# Patient Record
Sex: FEMALE | Race: WHITE | NOT HISPANIC OR LATINO | Employment: OTHER | URBAN - METROPOLITAN AREA
[De-identification: names, ages, dates, MRNs, and addresses within clinical notes are randomized per-mention and may not be internally consistent; named-entity substitution may affect disease eponyms.]

---

## 2017-10-04 ENCOUNTER — OFFICE VISIT (OUTPATIENT)
Dept: CARDIOLOGY | Facility: CLINIC | Age: 77
End: 2017-10-04

## 2017-10-04 VITALS
SYSTOLIC BLOOD PRESSURE: 178 MMHG | DIASTOLIC BLOOD PRESSURE: 84 MMHG | WEIGHT: 231 LBS | HEART RATE: 72 BPM | BODY MASS INDEX: 33.15 KG/M2

## 2017-10-04 DIAGNOSIS — R07.2 PRECORDIAL PAIN: ICD-10-CM

## 2017-10-04 DIAGNOSIS — I25.10 ATHEROSCLEROSIS OF NATIVE CORONARY ARTERY OF NATIVE HEART WITHOUT ANGINA PECTORIS: ICD-10-CM

## 2017-10-04 DIAGNOSIS — I10 ESSENTIAL HYPERTENSION: Primary | ICD-10-CM

## 2017-10-04 DIAGNOSIS — E78.5 HYPERLIPIDEMIA, UNSPECIFIED HYPERLIPIDEMIA TYPE: ICD-10-CM

## 2017-10-04 PROCEDURE — 99214 OFFICE O/P EST MOD 30 MIN: CPT | Performed by: INTERNAL MEDICINE

## 2017-10-04 RX ORDER — AMLODIPINE BESYLATE 5 MG/1
5 TABLET ORAL DAILY
Qty: 90 TABLET | Refills: 3 | Status: SHIPPED | OUTPATIENT
Start: 2017-10-04 | End: 2018-12-02 | Stop reason: SDUPTHER

## 2017-10-04 NOTE — PROGRESS NOTES
Subjective:       Cierra Kc is a 77 y.o. female who here for follow up    CC  Couple months waking up with lt arm pain  Radiating to retrosternal with sweats  HPI  77-year-old white female with well-known to us with the known history of coronary artery disease hypertension and hyperlipidemia has been under significant stress blood for the last couple months has been awakening with a left arm pain radiating to the retrosternal as well as associated sweats and shortness of breath gradually worsening lasting for several minutes     Problem List Items Addressed This Visit        Cardiovascular and Mediastinum    Atherosclerosis of coronary artery    Relevant Medications    amLODIPine (NORVASC) 5 MG tablet    Essential hypertension - Primary    Relevant Medications    amLODIPine (NORVASC) 5 MG tablet    Hyperlipidemia       Nervous and Auditory    Chest pain        .    The following portions of the patient's history were reviewed and updated as appropriate: allergies, current medications, past family history, past medical history, past social history, past surgical history and problem list.    Past Medical History:   Diagnosis Date   • COPD (chronic obstructive pulmonary disease)    • Coronary artery disease    • DDD (degenerative disc disease), lumbar    • Diastolic CHF    • Hyperlipidemia    • Hypertension    • Rectal abscess    • Stroke    • Vitamin B 12 deficiency     reports that she has never smoked. She has never used smokeless tobacco. She reports that she does not drink alcohol or use illicit drugs.  Family History   Problem Relation Age of Onset   • Hypertension Mother    • Hyperlipidemia Mother    • Hypertension Father    • COPD Father    • Stroke Brother    • Clotting disorder Brother    • Hypertension Brother    • Hyperlipidemia Brother    • Diabetes Maternal Aunt    • Diabetes Maternal Uncle    • Heart disease Maternal Grandmother    • Stroke Maternal Grandmother    • Diabetes Maternal Grandmother         Review of Systems  Constitutional: No wt loss, fever, fatigue  Gastrointestinal: No nausea, abdominal pain  Behavioral/Psych: No insomnia or anxiety   Cardiovascular Chest pains and left arm pain  Objective:       Physical Exam             Physical Exam  /84  Pulse 72  Wt 231 lb (105 kg)  BMI 33.15 kg/m2    General appearance: NAD, conversant   Eyes: anicteric sclerae, moist conjunctivae; no lid-lag; PERRLA   HENT: Atraumatic; oropharynx clear with moist mucous membranes and no mucosal ulcerations;  normal hard and soft palate   Neck: Trachea midline; FROM, supple, no thyromegaly or lymphadenopathy   Lungs: CTA, with normal respiratory effort and no intercostal retractions   CV: S1-S2 regular, no murmurs, no rub, no gallop   Abdomen: Soft, non-tender; no masses or HSM   Extremities: No peripheral edema or extremity lymphadenopathy  Skin: Normal temperature, turgor and texture; no rash, ulcers or subcutaneous nodules   Psych: Appropriate affect, alert and oriented to person, place and time           Cardiographics  @Procedures    Echocardiogram:        Current Outpatient Prescriptions:   •  citalopram (CeleXA) 40 MG tablet, Take 20 mg by mouth Daily., Disp: , Rfl:   •  divalproex (DEPAKOTE) 500 MG DR tablet, Take 500 mg by mouth 3 (Three) Times a Day., Disp: , Rfl:   •  famotidine (PEPCID) 20 MG tablet, Take 20 mg by mouth 2 (Two) Times a Day., Disp: , Rfl:   •  furosemide (LASIX) 40 MG tablet, Take 40 mg by mouth As Needed., Disp: , Rfl:   •  isosorbide mononitrate (IMDUR) 30 MG 24 hr tablet, Take 30 mg by mouth Daily., Disp: , Rfl:   •  metoprolol succinate XL (TOPROL-XL) 50 MG 24 hr tablet, Take 50 mg by mouth Daily., Disp: , Rfl:   •  Multiple Vitamins-Minerals (MULTI COMPLETE PO), Take  by mouth., Disp: , Rfl:   •  nitroglycerin (NITROSTAT) 0.4 MG SL tablet, Place  under the tongue., Disp: , Rfl:    Assessment:        Patient Active Problem List   Diagnosis   • Abnormal finding on thallium stress  test   • Chest pain   • Atherosclerosis of coronary artery   • Essential hypertension   • Breath shortness   • Unstable angina pectoris   • Disorder of joint   • History of cardiac catheterization   • Abscess of rectum   • Lumbar radiculopathy   • Degeneration of intervertebral disc of lumbar region   • Infected sebaceous cyst   • Cerebral hemorrhage   • Hyperlipidemia   • Chronic diastolic heart failure   • Depression   • Sebaceous cyst of skin of breast   • Moderate COPD (chronic obstructive pulmonary disease)   • Chronic obstructive pulmonary disease   • Community acquired pneumonia   • Chronic low back pain   • Abscess   • Hyperlipemia   • Acute non-ST-elevation myocardial infarction   • Dyslipidemia     11/16      Impression: 1. Angiographically normal coronaries  2. Normal LV systolic function  3. No aortic stenosis, mild mitral regurgitation      Plan:            ICD-10-CM ICD-9-CM   1. Essential hypertension I10 401.9   2. Atherosclerosis of native coronary artery of native heart without angina pectoris I25.10 414.01   3. Precordial pain R07.2 786.51   4. Hyperlipidemia, unspecified hyperlipidemia type E78.5 272.4     1. Essential hypertension  The blood pressure several controlled    2. Atherosclerosis of native coronary artery of native heart without angina pectoris  Cierra Kc with coronary artery disease has no angina pectoris    Risk reduction for the coronary artery disease, controlling the blood pressure, blood sugar management, cholesterol management, exercise, stress management, and proper compliance with medications and follow-up has been discussed      3. Precordial pain  Atypical    4. Hyperlipidemia, unspecified hyperlipidemia type  Counseling has been done     Lt arm pain    Dc isosorbide    Start Norvasc 5 mg po daily  COUNSELING:    Cierra Sotokurtiseling was given to patient for the following topics: diagnostic results, risk factor reductions, impressions, risks and benefits of treatment  options and importance of treatment compliance .       SMOKING COUNSELING:    Counseling given: Not Answered      EMR Dragon/Transcription disclaimer:   Much of this encounter note is an electronic transcription/translation of spoken language to printed text. The electronic translation of spoken language may permit erroneous, or at times, nonsensical words or phrases to be inadvertently transcribed; Although I have reviewed the note for such errors, some may still exist.

## 2017-11-01 ENCOUNTER — OFFICE VISIT (OUTPATIENT)
Dept: CARDIOLOGY | Facility: CLINIC | Age: 77
End: 2017-11-01

## 2017-11-01 VITALS
DIASTOLIC BLOOD PRESSURE: 83 MMHG | HEIGHT: 70 IN | HEART RATE: 71 BPM | WEIGHT: 232 LBS | BODY MASS INDEX: 33.21 KG/M2 | SYSTOLIC BLOOD PRESSURE: 161 MMHG

## 2017-11-01 DIAGNOSIS — I10 ESSENTIAL HYPERTENSION: Primary | ICD-10-CM

## 2017-11-01 DIAGNOSIS — I25.10 ATHEROSCLEROSIS OF NATIVE CORONARY ARTERY OF NATIVE HEART WITHOUT ANGINA PECTORIS: ICD-10-CM

## 2017-11-01 DIAGNOSIS — E78.5 HYPERLIPIDEMIA, UNSPECIFIED HYPERLIPIDEMIA TYPE: ICD-10-CM

## 2017-11-01 PROCEDURE — 99213 OFFICE O/P EST LOW 20 MIN: CPT | Performed by: INTERNAL MEDICINE

## 2017-11-01 RX ORDER — HYDROCODONE BITARTRATE AND ACETAMINOPHEN 7.5; 325 MG/1; MG/1
1 TABLET ORAL AS NEEDED
COMMUNITY
End: 2020-01-21

## 2017-11-01 NOTE — PROGRESS NOTES
Subjective:       Cierra Kc is a 77 y.o. female who here for follow up    CC  bp better with epidural shots and norvasc  HPI  77-year-old white female with a known history of atherosclerosis benign essential arterial hypertension as well as a hyperlipidemia here for the follow-up, blood pressures are much much better after that-year-old shock as well as Norvasc denies any chest pains or tightness in chest     Problem List Items Addressed This Visit        Cardiovascular and Mediastinum    Atherosclerosis of coronary artery    Essential hypertension - Primary    Hyperlipidemia        .    The following portions of the patient's history were reviewed and updated as appropriate: allergies, current medications, past family history, past medical history, past social history, past surgical history and problem list.    Past Medical History:   Diagnosis Date   • COPD (chronic obstructive pulmonary disease)    • Coronary artery disease    • DDD (degenerative disc disease), lumbar    • Diastolic CHF    • Hyperlipidemia    • Hypertension    • Rectal abscess    • Stroke    • Vitamin B 12 deficiency     reports that she has never smoked. She has never used smokeless tobacco. She reports that she does not drink alcohol or use illicit drugs.  Family History   Problem Relation Age of Onset   • Hypertension Mother    • Hyperlipidemia Mother    • Hypertension Father    • COPD Father    • Stroke Brother    • Clotting disorder Brother    • Hypertension Brother    • Hyperlipidemia Brother    • Diabetes Maternal Aunt    • Diabetes Maternal Uncle    • Heart disease Maternal Grandmother    • Stroke Maternal Grandmother    • Diabetes Maternal Grandmother        Review of Systems  Constitutional: No wt loss, fever, fatigue  Gastrointestinal: No nausea, abdominal pain  Behavioral/Psych: No insomnia or anxiety   Cardiovascular No chest pains or tightness in chest  Objective:       Physical Exam             Physical Exam  /83  Pulse  "71  Ht 70\" (177.8 cm)  Wt 232 lb (105 kg)  BMI 33.29 kg/m2    General appearance: NAD, conversant   Eyes: anicteric sclerae, moist conjunctivae; no lid-lag; PERRLA   HENT: Atraumatic; oropharynx clear with moist mucous membranes and no mucosal ulcerations;  normal hard and soft palate   Neck: Trachea midline; FROM, supple, no thyromegaly or lymphadenopathy   Lungs: CTA, with normal respiratory effort and no intercostal retractions   CV: S1-S2 regular, no murmurs, no rub, no gallop   Abdomen: Soft, non-tender; no masses or HSM   Extremities: No peripheral edema or extremity lymphadenopathy  Skin: Normal temperature, turgor and texture; no rash, ulcers or subcutaneous nodules   Psych: Appropriate affect, alert and oriented to person, place and time           Cardiographics  @Procedures    Echocardiogram:        Current Outpatient Prescriptions:   •  amLODIPine (NORVASC) 5 MG tablet, Take 1 tablet by mouth Daily., Disp: 90 tablet, Rfl: 3  •  citalopram (CeleXA) 40 MG tablet, Take 20 mg by mouth Daily., Disp: , Rfl:   •  divalproex (DEPAKOTE) 500 MG DR tablet, Take 500 mg by mouth 3 (Three) Times a Day., Disp: , Rfl:   •  famotidine (PEPCID) 20 MG tablet, Take 20 mg by mouth 2 (Two) Times a Day., Disp: , Rfl:   •  furosemide (LASIX) 40 MG tablet, Take 40 mg by mouth As Needed., Disp: , Rfl:   •  HYDROcodone-acetaminophen (NORCO) 7.5-325 MG per tablet, Take 1 tablet by mouth As Needed for Moderate Pain ., Disp: , Rfl:   •  metoprolol succinate XL (TOPROL-XL) 50 MG 24 hr tablet, Take 50 mg by mouth Daily., Disp: , Rfl:   •  Multiple Vitamins-Minerals (MULTI COMPLETE PO), Take  by mouth., Disp: , Rfl:   •  TURMERIC PO, Take  by mouth., Disp: , Rfl:   •  MULTIPLE VITAMINS-MINERALS ER PO, Take  by mouth., Disp: , Rfl:   •  nitroglycerin (NITROSTAT) 0.4 MG SL tablet, Place  under the tongue., Disp: , Rfl:    Assessment:        Patient Active Problem List   Diagnosis   • Abnormal finding on thallium stress test   • Chest " pain   • Atherosclerosis of coronary artery   • Essential hypertension   • Breath shortness   • Unstable angina pectoris   • Disorder of joint   • History of cardiac catheterization   • Abscess of rectum   • Lumbar radiculopathy   • Degeneration of intervertebral disc of lumbar region   • Infected sebaceous cyst   • Cerebral hemorrhage   • Hyperlipidemia   • Chronic diastolic heart failure   • Depression   • Sebaceous cyst of skin of breast   • Moderate COPD (chronic obstructive pulmonary disease)   • Chronic obstructive pulmonary disease   • Community acquired pneumonia   • Chronic low back pain   • Abscess   • Hyperlipemia   • Acute non-ST-elevation myocardial infarction   • Dyslipidemia               Plan:            ICD-10-CM ICD-9-CM   1. Essential hypertension I10 401.9   2. Atherosclerosis of native coronary artery of native heart without angina pectoris I25.10 414.01   3. Hyperlipidemia, unspecified hyperlipidemia type E78.5 272.4     1. Essential hypertension  Blood pressure better than before    Blood pressures are high because of the patient's severe back pain    2. Atherosclerosis of native coronary artery of native heart without angina pectoris  Cierra Kc with coronary artery disease has no angina pectoris    Risk reduction for the coronary artery disease, controlling the blood pressure, blood sugar management, cholesterol management, exercise, stress management, and proper compliance with medications and follow-up has been discussed      3. Hyperlipidemia, unspecified hyperlipidemia type  Risk of the hyperlipidemia, importance of the treatment has been explained    Pros and cons of the statins has been explained    Regular blood workup as well as side effects including the liver failure, myelopathy death has been explained        see us in 6 months  COUNSELING:    Cierra Sotoisrael was given to patient for the following topics: diagnostic results, risk factor reductions, impressions, risks  and benefits of treatment options and importance of treatment compliance .       SMOKING COUNSELING:    Counseling given: Not Answered      EMR Dragon/Transcription disclaimer:   Much of this encounter note is an electronic transcription/translation of spoken language to printed text. The electronic translation of spoken language may permit erroneous, or at times, nonsensical words or phrases to be inadvertently transcribed; Although I have reviewed the note for such errors, some may still exist.

## 2018-04-25 ENCOUNTER — OFFICE VISIT (OUTPATIENT)
Dept: CARDIOLOGY | Facility: CLINIC | Age: 78
End: 2018-04-25

## 2018-04-25 VITALS
HEIGHT: 70 IN | WEIGHT: 234 LBS | BODY MASS INDEX: 33.5 KG/M2 | DIASTOLIC BLOOD PRESSURE: 77 MMHG | HEART RATE: 82 BPM | SYSTOLIC BLOOD PRESSURE: 141 MMHG

## 2018-04-25 DIAGNOSIS — I25.10 ATHEROSCLEROSIS OF NATIVE CORONARY ARTERY OF NATIVE HEART WITHOUT ANGINA PECTORIS: ICD-10-CM

## 2018-04-25 DIAGNOSIS — R07.2 PRECORDIAL PAIN: ICD-10-CM

## 2018-04-25 DIAGNOSIS — E78.5 HYPERLIPIDEMIA, UNSPECIFIED HYPERLIPIDEMIA TYPE: ICD-10-CM

## 2018-04-25 DIAGNOSIS — I10 ESSENTIAL HYPERTENSION: Primary | ICD-10-CM

## 2018-04-25 PROCEDURE — 99213 OFFICE O/P EST LOW 20 MIN: CPT | Performed by: INTERNAL MEDICINE

## 2018-04-25 RX ORDER — PAROXETINE HYDROCHLORIDE 20 MG/1
20 TABLET, FILM COATED ORAL
COMMUNITY
Start: 2017-11-27 | End: 2018-11-27

## 2018-04-25 NOTE — PROGRESS NOTES
Subjective:       Cierra Kc is a 77 y.o. female who here for follow up    CC  Sob, getting worse  Occasional chest tightness  HPI  77-year-old female with known history of the coronary artery disease, benign essential arterial hypertension as well as hyperlipidemia has been complaining of shortness of breath getting worse, along with occasional tightness in the pressure sensations mild-to-moderate in intensity lasting for several minutes     Problem List Items Addressed This Visit        Cardiovascular and Mediastinum    Atherosclerosis of coronary artery    Essential hypertension - Primary    Hyperlipidemia      Other Visit Diagnoses    None.       .    The following portions of the patient's history were reviewed and updated as appropriate: allergies, current medications, past family history, past medical history, past social history, past surgical history and problem list.    Past Medical History:   Diagnosis Date   • COPD (chronic obstructive pulmonary disease)    • Coronary artery disease    • DDD (degenerative disc disease), lumbar    • Diastolic CHF    • Hyperlipidemia    • Hypertension    • Rectal abscess    • Stroke    • Vitamin B 12 deficiency     reports that she has never smoked. She has never used smokeless tobacco. She reports that she drinks alcohol. She reports that she does not use drugs.  Family History   Problem Relation Age of Onset   • Hypertension Mother    • Hyperlipidemia Mother    • Hypertension Father    • COPD Father    • Stroke Brother    • Clotting disorder Brother    • Hypertension Brother    • Hyperlipidemia Brother    • Diabetes Maternal Aunt    • Diabetes Maternal Uncle    • Heart disease Maternal Grandmother    • Stroke Maternal Grandmother    • Diabetes Maternal Grandmother        Review of Systems  Constitutional: No wt loss, fever, fatigue  Gastrointestinal: No nausea, abdominal pain  Behavioral/Psych: No insomnia or anxiety   Cardiovascular Shortness of breath and chest  "tightness  Objective:       Physical Exam           Physical Exam  /77   Pulse 82   Ht 177.8 cm (70\")   Wt 106 kg (234 lb)   BMI 33.58 kg/m²     General appearance: NAD, conversant   Eyes: anicteric sclerae, moist conjunctivae; no lid-lag; PERRLA   HENT: Atraumatic; oropharynx clear with moist mucous membranes and no mucosal ulcerations;  normal hard and soft palate   Neck: Trachea midline; FROM, supple, no thyromegaly or lymphadenopathy   Lungs: CTA, with normal respiratory effort and no intercostal retractions   CV: S1-S2 regular, no murmurs, no rub, no gallop   Abdomen: Soft, non-tender; no masses or HSM   Extremities: No peripheral edema or extremity lymphadenopathy  Skin: Normal temperature, turgor and texture; no rash, ulcers or subcutaneous nodules   Psych: Appropriate affect, alert and oriented to person, place and time           Cardiographics  @Procedures    Echocardiogram:        Current Outpatient Prescriptions:   •  amLODIPine (NORVASC) 5 MG tablet, Take 1 tablet by mouth Daily., Disp: 90 tablet, Rfl: 3  •  divalproex (DEPAKOTE) 500 MG DR tablet, Take 500 mg by mouth 3 (Three) Times a Day., Disp: , Rfl:   •  famotidine (PEPCID) 20 MG tablet, Take 20 mg by mouth 2 (Two) Times a Day., Disp: , Rfl:   •  furosemide (LASIX) 40 MG tablet, Take 40 mg by mouth As Needed., Disp: , Rfl:   •  HYDROcodone-acetaminophen (NORCO) 7.5-325 MG per tablet, Take 1 tablet by mouth As Needed for Moderate Pain ., Disp: , Rfl:   •  metoprolol succinate XL (TOPROL-XL) 50 MG 24 hr tablet, Take 50 mg by mouth Daily., Disp: , Rfl:   •  Multiple Vitamins-Minerals (MULTI COMPLETE PO), Take  by mouth., Disp: , Rfl:   •  MULTIPLE VITAMINS-MINERALS ER PO, Take  by mouth., Disp: , Rfl:   •  PARoxetine (PAXIL) 20 MG tablet, Take 20 mg by mouth., Disp: , Rfl:   •  TURMERIC PO, Take  by mouth., Disp: , Rfl:   •  nitroglycerin (NITROSTAT) 0.4 MG SL tablet, Place  under the tongue., Disp: , Rfl:    Assessment:        Patient Active " Problem List   Diagnosis   • Abnormal finding on thallium stress test   • Chest pain   • Atherosclerosis of coronary artery   • Essential hypertension   • Breath shortness   • Unstable angina pectoris   • Disorder of joint   • History of cardiac catheterization   • Abscess of rectum   • Lumbar radiculopathy   • Degeneration of intervertebral disc of lumbar region   • Infected sebaceous cyst   • Cerebral hemorrhage   • Hyperlipidemia   • Chronic diastolic heart failure   • Depression   • Sebaceous cyst of skin of breast   • Moderate COPD (chronic obstructive pulmonary disease)   • Chronic obstructive pulmonary disease   • Community acquired pneumonia   • Chronic low back pain   • Abscess   • Hyperlipemia   • Acute non-ST-elevation myocardial infarction   • Dyslipidemia     Chol 171          Plan:            ICD-10-CM ICD-9-CM   1. Essential hypertension I10 401.9   2. Atherosclerosis of native coronary artery of native heart without angina pectoris I25.10 414.01   3. Hyperlipidemia, unspecified hyperlipidemia type E78.5 272.4   4. Precordial pain R07.2 786.51     1. Essential hypertension  Blood pressures well-controlled  - Stress Test With Myocardial Perfusion - One Day  - Adult Transthoracic Echo Complete W/ Cont if Necessary Per Protocol    2. Atherosclerosis of native coronary artery of native heart without angina pectoris  Cierra Kc with coronary artery disease has no angina pectoris    Risk reduction for the coronary artery disease, controlling the blood pressure, blood sugar management, cholesterol management, exercise, stress management, and proper compliance with medications and follow-up has been discussed    - Stress Test With Myocardial Perfusion - One Day  - Adult Transthoracic Echo Complete W/ Cont if Necessary Per Protocol    3. Hyperlipidemia, unspecified hyperlipidemia type  Risk of the hyperlipidemia, importance of the treatment has been explained    Pros and cons of the statins has been  explained    Regular blood workup as well as side effects including the liver failure, myelopathy death has been explained        - Stress Test With Myocardial Perfusion - One Day  - Adult Transthoracic Echo Complete W/ Cont if Necessary Per Protocol    4. Precordial pain  Considering the patient's symptoms as well as clinical situation and  EKG findings, along with cardiac risk factors, ischemic workup is necessary to rule out ischemic cardiomyopathy, stress induced arrhythmias, and functional capacity for diagnosis as well as prognostic consideration    Considering patient's medical condition as well as the risk factors, patient will require echocardiogram for further evaluation for the LV function, four-chamber evaluation, including the pressures, valvular function and  pericardial disease and pericardial effusion    - Stress Test With Myocardial Perfusion - One Day  - Adult Transthoracic Echo Complete W/ Cont if Necessary Per Protocol       [facility], echo    See us 2-4 wks  COUNSELING:    Cierra Dick was given to patient for the following topics: diagnostic results, risk factor reductions, impressions, risks and benefits of treatment options and importance of treatment compliance .       SMOKING COUNSELING:    Counseling given: Not Answered      EMR Dragon/Transcription disclaimer:   Much of this encounter note is an electronic transcription/translation of spoken language to printed text. The electronic translation of spoken language may permit erroneous, or at times, nonsensical words or phrases to be inadvertently transcribed; Although I have reviewed the note for such errors, some may still exist.

## 2018-05-01 ENCOUNTER — HOSPITAL ENCOUNTER (OUTPATIENT)
Dept: CARDIOLOGY | Facility: HOSPITAL | Age: 78
Discharge: HOME | End: 2018-05-01
Attending: INTERNAL MEDICINE | Admitting: INTERNAL MEDICINE

## 2018-05-01 VITALS
HEIGHT: 70 IN | BODY MASS INDEX: 33.5 KG/M2 | HEART RATE: 73 BPM | DIASTOLIC BLOOD PRESSURE: 77 MMHG | WEIGHT: 234 LBS | SYSTOLIC BLOOD PRESSURE: 141 MMHG

## 2018-05-01 LAB
BH CV ECHO MEAS - ACS: 1.8 CM
BH CV ECHO MEAS - AO MAX PG (FULL): 4.2 MMHG
BH CV ECHO MEAS - AO MAX PG: 7.3 MMHG
BH CV ECHO MEAS - AO MEAN PG (FULL): 3 MMHG
BH CV ECHO MEAS - AO MEAN PG: 4 MMHG
BH CV ECHO MEAS - AO ROOT AREA (BSA CORRECTED): 1.4
BH CV ECHO MEAS - AO ROOT AREA: 8 CM^2
BH CV ECHO MEAS - AO ROOT DIAM: 3.2 CM
BH CV ECHO MEAS - AO V2 MAX: 135 CM/SEC
BH CV ECHO MEAS - AO V2 MEAN: 90.9 CM/SEC
BH CV ECHO MEAS - AO V2 VTI: 23.5 CM
BH CV ECHO MEAS - AVA(I,A): 3.5 CM^2
BH CV ECHO MEAS - AVA(I,D): 3.5 CM^2
BH CV ECHO MEAS - AVA(V,A): 2.9 CM^2
BH CV ECHO MEAS - AVA(V,D): 2.9 CM^2
BH CV ECHO MEAS - BSA(HAYCOCK): 2.3 M^2
BH CV ECHO MEAS - BSA: 2.2 M^2
BH CV ECHO MEAS - BZI_BMI: 33.6 KILOGRAMS/M^2
BH CV ECHO MEAS - BZI_METRIC_HEIGHT: 177.8 CM
BH CV ECHO MEAS - BZI_METRIC_WEIGHT: 106.1 KG
BH CV ECHO MEAS - CONTRAST EF (2CH): 65 ML/M^2
BH CV ECHO MEAS - CONTRAST EF 4CH: 66.7 ML/M^2
BH CV ECHO MEAS - EDV(CUBED): 74.1 ML
BH CV ECHO MEAS - EDV(MOD-SP2): 60 ML
BH CV ECHO MEAS - EDV(MOD-SP4): 60 ML
BH CV ECHO MEAS - EDV(TEICH): 78.6 ML
BH CV ECHO MEAS - EF(CUBED): 59.8 %
BH CV ECHO MEAS - EF(MOD-BP): 60 %
BH CV ECHO MEAS - EF(MOD-SP2): 65 %
BH CV ECHO MEAS - EF(MOD-SP4): 66.7 %
BH CV ECHO MEAS - EF(TEICH): 51.7 %
BH CV ECHO MEAS - ESV(CUBED): 29.8 ML
BH CV ECHO MEAS - ESV(MOD-SP2): 21 ML
BH CV ECHO MEAS - ESV(MOD-SP4): 20 ML
BH CV ECHO MEAS - ESV(TEICH): 37.9 ML
BH CV ECHO MEAS - FS: 26.2 %
BH CV ECHO MEAS - IVS/LVPW: 0.92
BH CV ECHO MEAS - IVSD: 1.1 CM
BH CV ECHO MEAS - LA DIMENSION: 3.9 CM
BH CV ECHO MEAS - LA/AO: 1.2
BH CV ECHO MEAS - LAT PEAK E' VEL: 6.3 CM/SEC
BH CV ECHO MEAS - LV DIASTOLIC VOL/BSA (35-75): 26.9 ML/M^2
BH CV ECHO MEAS - LV MASS(C)D: 167.4 GRAMS
BH CV ECHO MEAS - LV MASS(C)DI: 75 GRAMS/M^2
BH CV ECHO MEAS - LV MAX PG: 3.1 MMHG
BH CV ECHO MEAS - LV MEAN PG: 1 MMHG
BH CV ECHO MEAS - LV SYSTOLIC VOL/BSA (12-30): 9 ML/M^2
BH CV ECHO MEAS - LV V1 MAX: 87.9 CM/SEC
BH CV ECHO MEAS - LV V1 MEAN: 56.3 CM/SEC
BH CV ECHO MEAS - LV V1 VTI: 18 CM
BH CV ECHO MEAS - LVIDD: 4.2 CM
BH CV ECHO MEAS - LVIDS: 3.1 CM
BH CV ECHO MEAS - LVLD AP2: 6.6 CM
BH CV ECHO MEAS - LVLD AP4: 7.1 CM
BH CV ECHO MEAS - LVLS AP2: 5.2 CM
BH CV ECHO MEAS - LVLS AP4: 5.9 CM
BH CV ECHO MEAS - LVOT AREA (M): 4.5 CM^2
BH CV ECHO MEAS - LVOT AREA: 4.5 CM^2
BH CV ECHO MEAS - LVOT DIAM: 2.4 CM
BH CV ECHO MEAS - LVPWD: 1.2 CM
BH CV ECHO MEAS - MED PEAK E' VEL: 10.9 CM/SEC
BH CV ECHO MEAS - MV A DUR: 0.16 SEC
BH CV ECHO MEAS - MV A MAX VEL: 65.5 CM/SEC
BH CV ECHO MEAS - MV DEC SLOPE: 203 CM/SEC^2
BH CV ECHO MEAS - MV DEC TIME: 0.22 SEC
BH CV ECHO MEAS - MV E MAX VEL: 50.4 CM/SEC
BH CV ECHO MEAS - MV E/A: 0.77
BH CV ECHO MEAS - MV MAX PG: 3.6 MMHG
BH CV ECHO MEAS - MV MEAN PG: 1 MMHG
BH CV ECHO MEAS - MV P1/2T MAX VEL: 59.7 CM/SEC
BH CV ECHO MEAS - MV P1/2T: 86.1 MSEC
BH CV ECHO MEAS - MV V2 MAX: 94.7 CM/SEC
BH CV ECHO MEAS - MV V2 MEAN: 50.4 CM/SEC
BH CV ECHO MEAS - MV V2 VTI: 19.3 CM
BH CV ECHO MEAS - MVA P1/2T LCG: 3.7 CM^2
BH CV ECHO MEAS - MVA(P1/2T): 2.6 CM^2
BH CV ECHO MEAS - MVA(VTI): 4.2 CM^2
BH CV ECHO MEAS - PA ACC TIME: 0.09 SEC
BH CV ECHO MEAS - PA MAX PG (FULL): 1.3 MMHG
BH CV ECHO MEAS - PA MAX PG: 3.7 MMHG
BH CV ECHO MEAS - PA PR(ACCEL): 39.9 MMHG
BH CV ECHO MEAS - PA V2 MAX: 96.4 CM/SEC
BH CV ECHO MEAS - PULM A REVS DUR: 0.15 SEC
BH CV ECHO MEAS - PULM A REVS VEL: 29.3 CM/SEC
BH CV ECHO MEAS - PULM DIAS VEL: 34.6 CM/SEC
BH CV ECHO MEAS - PULM S/D: 1.3
BH CV ECHO MEAS - PULM SYS VEL: 46.5 CM/SEC
BH CV ECHO MEAS - PVA(V,A): 3.6 CM^2
BH CV ECHO MEAS - PVA(V,D): 3.6 CM^2
BH CV ECHO MEAS - QP/QS: 0.73
BH CV ECHO MEAS - RV MAX PG: 2.4 MMHG
BH CV ECHO MEAS - RV MEAN PG: 1 MMHG
BH CV ECHO MEAS - RV V1 MAX: 77.6 CM/SEC
BH CV ECHO MEAS - RV V1 MEAN: 56.1 CM/SEC
BH CV ECHO MEAS - RV V1 VTI: 13.1 CM
BH CV ECHO MEAS - RVDD: 2.8 CM
BH CV ECHO MEAS - RVOT AREA: 4.5 CM^2
BH CV ECHO MEAS - RVOT DIAM: 2.4 CM
BH CV ECHO MEAS - SI(AO): 84.7 ML/M^2
BH CV ECHO MEAS - SI(CUBED): 19.9 ML/M^2
BH CV ECHO MEAS - SI(LVOT): 36.5 ML/M^2
BH CV ECHO MEAS - SI(MOD-SP2): 17.5 ML/M^2
BH CV ECHO MEAS - SI(MOD-SP4): 17.9 ML/M^2
BH CV ECHO MEAS - SI(TEICH): 18.2 ML/M^2
BH CV ECHO MEAS - SV(AO): 189 ML
BH CV ECHO MEAS - SV(CUBED): 44.3 ML
BH CV ECHO MEAS - SV(LVOT): 81.4 ML
BH CV ECHO MEAS - SV(MOD-SP2): 39 ML
BH CV ECHO MEAS - SV(MOD-SP4): 40 ML
BH CV ECHO MEAS - SV(RVOT): 59.3 ML
BH CV ECHO MEAS - SV(TEICH): 40.7 ML
BH CV ECHO MEAS - TAPSE (>1.6): 2 CM2
BH CV ECHO MEASUREMENTS AVERAGE E/E' RATIO: 5.86
BH CV XLRA - RV BASE: 3.8 CM
BH CV XLRA - RV LENGTH: 7.5 CM
BH CV XLRA - RV MID: 2.5 CM
BH CV XLRA - TDI S': 17.3 CM/SEC
LEFT ATRIUM VOLUME INDEX: 29 ML/M2
MAXIMAL PREDICTED HEART RATE: 143 BPM
STRESS TARGET HR: 122 BPM

## 2018-05-01 PROCEDURE — 93306 TTE W/DOPPLER COMPLETE: CPT | Performed by: INTERNAL MEDICINE

## 2018-05-01 PROCEDURE — 93306 TTE W/DOPPLER COMPLETE: CPT

## 2018-05-17 ENCOUNTER — HOSPITAL ENCOUNTER (OUTPATIENT)
Dept: CARDIOLOGY | Facility: HOSPITAL | Age: 78
End: 2018-05-17
Attending: INTERNAL MEDICINE

## 2018-05-17 ENCOUNTER — OFFICE VISIT (OUTPATIENT)
Dept: CARDIOLOGY | Facility: CLINIC | Age: 78
End: 2018-05-17

## 2018-05-17 VITALS
HEART RATE: 97 BPM | DIASTOLIC BLOOD PRESSURE: 81 MMHG | HEIGHT: 70 IN | WEIGHT: 237 LBS | SYSTOLIC BLOOD PRESSURE: 143 MMHG | BODY MASS INDEX: 33.93 KG/M2

## 2018-05-17 DIAGNOSIS — I10 ESSENTIAL HYPERTENSION: ICD-10-CM

## 2018-05-17 DIAGNOSIS — I25.10 ATHEROSCLEROSIS OF NATIVE CORONARY ARTERY OF NATIVE HEART WITHOUT ANGINA PECTORIS: Primary | ICD-10-CM

## 2018-05-17 DIAGNOSIS — E78.5 HYPERLIPIDEMIA, UNSPECIFIED HYPERLIPIDEMIA TYPE: ICD-10-CM

## 2018-05-17 DIAGNOSIS — M79.89 LEG SWELLING: ICD-10-CM

## 2018-05-17 PROCEDURE — 93000 ELECTROCARDIOGRAM COMPLETE: CPT | Performed by: INTERNAL MEDICINE

## 2018-05-17 PROCEDURE — 99214 OFFICE O/P EST MOD 30 MIN: CPT | Performed by: INTERNAL MEDICINE

## 2018-05-17 RX ORDER — TORSEMIDE 20 MG/1
20 TABLET ORAL DAILY
Qty: 30 TABLET | Refills: 6 | Status: SHIPPED | OUTPATIENT
Start: 2018-05-17 | End: 2020-01-03

## 2018-05-17 RX ORDER — ATORVASTATIN CALCIUM 10 MG/1
10 TABLET, FILM COATED ORAL NIGHTLY
COMMUNITY

## 2018-05-17 RX ORDER — CHOLESTYRAMINE LIGHT 4 G/5.7G
POWDER, FOR SUSPENSION ORAL 2 TIMES DAILY PRN
COMMUNITY
End: 2021-03-29

## 2018-05-17 RX ORDER — DOXYCYCLINE HYCLATE 100 MG
100 TABLET ORAL
COMMUNITY
Start: 2018-05-11 | End: 2018-05-21

## 2018-05-17 NOTE — PROGRESS NOTES
Subjective:       Cierra Kc is a 77 y.o. female who here for follow up    CC  EDEMA BILATERAL MODERATE  HPI  77-year-old female with known history of coronary artery disease, hypertension hyperlipidemia has been complaining of bilateral leg swelling moderate gradually worsening over the several weeks associated with pain     Problem List Items Addressed This Visit        Cardiovascular and Mediastinum    Atherosclerosis of coronary artery - Primary    Essential hypertension    Relevant Medications    torsemide (DEMADEX) 20 MG tablet    Hyperlipidemia    Relevant Medications    atorvastatin (LIPITOR) 10 MG tablet    cholestyramine light (PREVALITE) 4 GM/DOSE powder       Other    Leg swelling        .    The following portions of the patient's history were reviewed and updated as appropriate: allergies, current medications, past family history, past medical history, past social history, past surgical history and problem list.    Past Medical History:   Diagnosis Date   • COPD (chronic obstructive pulmonary disease)    • Coronary artery disease    • DDD (degenerative disc disease), lumbar    • Diastolic CHF    • Hyperlipidemia    • Hypertension    • Rectal abscess    • Stroke    • Vitamin B 12 deficiency     reports that she has never smoked. She has never used smokeless tobacco. She reports that she drinks alcohol. She reports that she does not use drugs.  Family History   Problem Relation Age of Onset   • Hypertension Mother    • Hyperlipidemia Mother    • Hypertension Father    • COPD Father    • Stroke Brother    • Clotting disorder Brother    • Hypertension Brother    • Hyperlipidemia Brother    • Diabetes Maternal Aunt    • Diabetes Maternal Uncle    • Heart disease Maternal Grandmother    • Stroke Maternal Grandmother    • Diabetes Maternal Grandmother        Review of Systems  Constitutional: No wt loss, fever, fatigue  Gastrointestinal: No nausea, abdominal pain  Behavioral/Psych: No insomnia or  "anxiety   Cardiovascular Bilateral leg swelling with no chest pain  Objective:       Physical Exam           Physical Exam  /81   Pulse 97   Ht 176.5 cm (69.5\")   Wt 108 kg (237 lb)   BMI 34.50 kg/m²     General appearance: NAD, conversant   Eyes: anicteric sclerae, moist conjunctivae; no lid-lag; PERRLA   HENT: Atraumatic; oropharynx clear with moist mucous membranes and no mucosal ulcerations;  normal hard and soft palate   Neck: Trachea midline; FROM, supple, no thyromegaly or lymphadenopathy   Lungs: CTA, with normal respiratory effort and no intercostal retractions   CV: S1-S2 regular, no murmurs, no rub, no gallop   Abdomen: Soft, non-tender; no masses or HSM   Extremities: 1+ peripheral edema or extremity lymphadenopathy  Skin: Normal temperature, turgor and texture; no rash, ulcers or subcutaneous nodules   Psych: Appropriate affect, alert and oriented to person, place and time           Cardiographics  @  ECG 12 Lead  Date/Time: 5/17/2018 9:33 AM  Performed by: MOMO PERDOMO  Authorized by: MOMO PERDOMO   Comparison: not compared with previous ECG   Previous ECG: no previous ECG available  Rhythm: sinus rhythm  ST Flattening: all  Clinical impression: non-specific ECG            Echocardiogram:    Interpretation Summary     · Left atrial cavity size is mildly dilated.  · Mild tricuspid valve regurgitation is present.  · Calculated EF = 60%.  · There is no evidence of pericardial effusion.            Current Outpatient Prescriptions:   •  amLODIPine (NORVASC) 5 MG tablet, Take 1 tablet by mouth Daily., Disp: 90 tablet, Rfl: 3  •  atorvastatin (LIPITOR) 10 MG tablet, Take 10 mg by mouth Daily., Disp: , Rfl:   •  cholestyramine light (PREVALITE) 4 GM/DOSE powder, Take  by mouth 2 (Two) Times a Day With Meals., Disp: , Rfl:   •  divalproex (DEPAKOTE) 500 MG DR tablet, Take 500 mg by mouth 3 (Three) Times a Day., Disp: , Rfl:   •  doxycycline (VIBRAMYICN) 100 MG tablet, Take 100 mg by " mouth., Disp: , Rfl:   •  famotidine (PEPCID) 20 MG tablet, Take 20 mg by mouth 2 (Two) Times a Day., Disp: , Rfl:   •  furosemide (LASIX) 40 MG tablet, Take 40 mg by mouth As Needed., Disp: , Rfl:   •  HYDROcodone-acetaminophen (NORCO) 7.5-325 MG per tablet, Take 1 tablet by mouth As Needed for Moderate Pain ., Disp: , Rfl:   •  metoprolol succinate XL (TOPROL-XL) 50 MG 24 hr tablet, Take 50 mg by mouth Daily., Disp: , Rfl:   •  Multiple Vitamins-Minerals (MULTI COMPLETE PO), Take  by mouth., Disp: , Rfl:   •  Naproxen Sod-Diphenhydramine (ALEVE PM PO), Take  by mouth As Needed., Disp: , Rfl:   •  PARoxetine (PAXIL) 20 MG tablet, Take 20 mg by mouth., Disp: , Rfl:   •  TURMERIC PO, Take  by mouth., Disp: , Rfl:   •  nitroglycerin (NITROSTAT) 0.4 MG SL tablet, Place  under the tongue., Disp: , Rfl:    Assessment:        Patient Active Problem List   Diagnosis   • Abnormal finding on thallium stress test   • Chest pain   • Atherosclerosis of coronary artery   • Essential hypertension   • Breath shortness   • Unstable angina pectoris   • Disorder of joint   • History of cardiac catheterization   • Abscess of rectum   • Lumbar radiculopathy   • Degeneration of intervertebral disc of lumbar region   • Infected sebaceous cyst   • Cerebral hemorrhage   • Hyperlipidemia   • Chronic diastolic heart failure   • Depression   • Sebaceous cyst of skin of breast   • Moderate COPD (chronic obstructive pulmonary disease)   • Chronic obstructive pulmonary disease   • Community acquired pneumonia   • Chronic low back pain   • Abscess   • Hyperlipemia   • Acute non-ST-elevation myocardial infarction   • Dyslipidemia               Plan:            ICD-10-CM ICD-9-CM   1. Atherosclerosis of native coronary artery of native heart without angina pectoris I25.10 414.01   2. Essential hypertension I10 401.9   3. Hyperlipidemia, unspecified hyperlipidemia type E78.5 272.4   4. Leg swelling M79.89 729.81     1. Atherosclerosis of native  coronary artery of native heart without angina pectoris  No chest pain    2. Essential hypertension  Blood pressure under control    3. Hyperlipidemia, unspecified hyperlipidemia type  Risk of the hyperlipidemia, importance of the treatment has been explained    Pros and cons of the statins has been explained    Regular blood workup as well as side effects including the liver failure, myelopathy death has been explained      4. Leg swelling  Cierra Kc has been complaining of the leg swelling, appears dependent pedal edema, significantly contributed with a venous insufficiency.    Strong recommendations of elevating the legs as well as using support hoses, along with the control of fluids and salt restriction has been explained in details         CHANGE LASIX TO DEMADEX 20 MG PO DAILY    Pros and cons of this new medication / change medication has been explained to  the patient    Possible side effects has been explained    Associated need of the blood  Work has been explained    Need for the compliance of the medication has been explained      REDUCE SALT / WATER INTAKE    STRESS CARDIOLYTE    SEE US 2 WKS  COUNSELING:    Cierra Sotokurtiseling was given to patient for the following topics: diagnostic results, risk factor reductions, impressions, risks and benefits of treatment options and importance of treatment compliance .       SMOKING COUNSELING:    Counseling given: Not Answered      EMR Dragon/Transcription disclaimer:   Much of this encounter note is an electronic transcription/translation of spoken language to printed text. The electronic translation of spoken language may permit erroneous, or at times, nonsensical words or phrases to be inadvertently transcribed; Although I have reviewed the note for such errors, some may still exist.

## 2018-05-28 PROBLEM — M79.89 LEG SWELLING: Status: ACTIVE | Noted: 2018-05-28

## 2018-06-13 ENCOUNTER — HOSPITAL ENCOUNTER (OUTPATIENT)
Dept: CARDIOLOGY | Facility: HOSPITAL | Age: 78
Discharge: HOME | End: 2018-06-13
Attending: INTERNAL MEDICINE

## 2018-06-13 VITALS
HEIGHT: 70 IN | OXYGEN SATURATION: 99 % | WEIGHT: 240 LBS | RESPIRATION RATE: 20 BRPM | SYSTOLIC BLOOD PRESSURE: 142 MMHG | DIASTOLIC BLOOD PRESSURE: 74 MMHG | HEART RATE: 69 BPM | BODY MASS INDEX: 34.36 KG/M2

## 2018-06-13 PROCEDURE — 78452 HT MUSCLE IMAGE SPECT MULT: CPT

## 2018-06-13 PROCEDURE — 0 TECHNETIUM SESTAMIBI: Performed by: INTERNAL MEDICINE

## 2018-06-13 PROCEDURE — A9500 TC99M SESTAMIBI: HCPCS | Performed by: INTERNAL MEDICINE

## 2018-06-13 PROCEDURE — 93018 CV STRESS TEST I&R ONLY: CPT | Performed by: INTERNAL MEDICINE

## 2018-06-13 PROCEDURE — 93017 CV STRESS TEST TRACING ONLY: CPT

## 2018-06-13 PROCEDURE — 25010000002 REGADENOSON 0.4 MG/5ML SOLUTION: Performed by: INTERNAL MEDICINE

## 2018-06-13 PROCEDURE — 93016 CV STRESS TEST SUPVJ ONLY: CPT | Performed by: INTERNAL MEDICINE

## 2018-06-13 PROCEDURE — 78452 HT MUSCLE IMAGE SPECT MULT: CPT | Performed by: INTERNAL MEDICINE

## 2018-06-13 RX ORDER — METOLAZONE 5 MG/1
5 TABLET ORAL
COMMUNITY
Start: 2018-05-30 | End: 2021-03-29

## 2018-06-13 RX ADMIN — TECHNETIUM TC 99M SESTAMIBI 1 DOSE: 1 INJECTION INTRAVENOUS at 12:14

## 2018-06-13 RX ADMIN — TECHNETIUM TC 99M SESTAMIBI 1 DOSE: 1 INJECTION INTRAVENOUS at 08:22

## 2018-06-13 RX ADMIN — REGADENOSON 0.4 MG: 0.08 INJECTION, SOLUTION INTRAVENOUS at 12:14

## 2018-06-14 LAB
BH CV STRESS BP STAGE 1: NORMAL
BH CV STRESS COMMENTS STAGE 1: NORMAL
BH CV STRESS DOSE REGADENOSON STAGE 1: 0.4
BH CV STRESS DURATION MIN STAGE 1: 1
BH CV STRESS DURATION SEC STAGE 1: 0
BH CV STRESS HR STAGE 1: 76
BH CV STRESS O2 STAGE 1: 99
BH CV STRESS PROTOCOL 1: NORMAL
BH CV STRESS RECOVERY BP: NORMAL MMHG
BH CV STRESS RECOVERY HR: 71 BPM
BH CV STRESS RECOVERY O2: 99 %
BH CV STRESS STAGE 1: 1
LV EF NUC BP: 60 %
MAXIMAL PREDICTED HEART RATE: 142 BPM
PERCENT MAX PREDICTED HR: 53.52 %
STRESS BASELINE BP: NORMAL MMHG
STRESS BASELINE HR: 67 BPM
STRESS O2 SAT REST: 99 %
STRESS PERCENT HR: 63 %
STRESS POST ESTIMATED WORKLOAD: 1 METS
STRESS POST EXERCISE DUR MIN: 1 MIN
STRESS POST EXERCISE DUR SEC: 0 SEC
STRESS POST O2 SAT PEAK: 99 %
STRESS POST PEAK BP: NORMAL MMHG
STRESS POST PEAK HR: 76 BPM
STRESS TARGET HR: 121 BPM

## 2018-06-18 ENCOUNTER — OFFICE VISIT (OUTPATIENT)
Dept: CARDIOLOGY | Facility: CLINIC | Age: 78
End: 2018-06-18

## 2018-06-18 VITALS
DIASTOLIC BLOOD PRESSURE: 70 MMHG | HEART RATE: 71 BPM | HEIGHT: 70 IN | WEIGHT: 235 LBS | SYSTOLIC BLOOD PRESSURE: 131 MMHG | BODY MASS INDEX: 33.64 KG/M2

## 2018-06-18 DIAGNOSIS — I10 ESSENTIAL HYPERTENSION: ICD-10-CM

## 2018-06-18 DIAGNOSIS — I25.10 ATHEROSCLEROSIS OF NATIVE CORONARY ARTERY OF NATIVE HEART WITHOUT ANGINA PECTORIS: Primary | ICD-10-CM

## 2018-06-18 DIAGNOSIS — E78.5 HYPERLIPIDEMIA, UNSPECIFIED HYPERLIPIDEMIA TYPE: ICD-10-CM

## 2018-06-18 PROCEDURE — 99213 OFFICE O/P EST LOW 20 MIN: CPT | Performed by: INTERNAL MEDICINE

## 2018-12-03 RX ORDER — AMLODIPINE BESYLATE 5 MG/1
TABLET ORAL
Qty: 90 TABLET | Refills: 3 | Status: SHIPPED | OUTPATIENT
Start: 2018-12-03 | End: 2019-01-10 | Stop reason: SDUPTHER

## 2019-01-10 RX ORDER — AMLODIPINE BESYLATE 5 MG/1
5 TABLET ORAL DAILY
Qty: 90 TABLET | Refills: 1 | Status: SHIPPED | OUTPATIENT
Start: 2019-01-10 | End: 2019-01-18 | Stop reason: SDUPTHER

## 2019-01-18 RX ORDER — AMLODIPINE BESYLATE 5 MG/1
5 TABLET ORAL DAILY
Qty: 90 TABLET | Refills: 0 | Status: SHIPPED | OUTPATIENT
Start: 2019-01-18 | End: 2019-09-05 | Stop reason: SDUPTHER

## 2019-09-06 RX ORDER — AMLODIPINE BESYLATE 5 MG/1
TABLET ORAL
Qty: 90 TABLET | Refills: 0 | Status: SHIPPED | OUTPATIENT
Start: 2019-09-06 | End: 2019-12-07 | Stop reason: SDUPTHER

## 2019-11-27 RX ORDER — AMLODIPINE BESYLATE 5 MG/1
TABLET ORAL
Qty: 90 TABLET | Refills: 0 | OUTPATIENT
Start: 2019-11-27

## 2019-12-09 RX ORDER — AMLODIPINE BESYLATE 5 MG/1
TABLET ORAL
Qty: 90 TABLET | Refills: 0 | Status: SHIPPED | OUTPATIENT
Start: 2019-12-09 | End: 2020-01-21

## 2019-12-20 ENCOUNTER — TELEPHONE (OUTPATIENT)
Dept: PHARMACY | Facility: HOSPITAL | Age: 79
End: 2019-12-20

## 2019-12-26 ENCOUNTER — TELEPHONE (OUTPATIENT)
Dept: PHARMACY | Facility: HOSPITAL | Age: 79
End: 2019-12-26

## 2019-12-26 NOTE — PROGRESS NOTES
"Pharmacy Note - Penicillin Allergy Review    Cierra Kc is a 79 y.o. female who has a documented allergy of penicillins. Pharmacy has thoroughly reviewed the patient's allergy and past antibiotic tolerability to streamline antibiotic prophylaxis prior to hip or knee arthroplasty.    Beta-lactam antibiotic allergy and date reported in Epic: Penicillins (3/31/16) with a reaction of swelling     Patient has tolerated the following beta-lactam antibiotics previously per Epic review (with dates): None    Interview of Allergy History:    What was the suspected medication name and route? Penicillin, injection    When was the reaction? Early 20s    How soon after taking the antibiotic did the reaction occur? About 30-45 min    What symptoms were experienced during the reaction? \"Swelling all over like hives,\" patient reports passing out    Were you hospitalized or have to go to the emergency department? No, just told her doctor    Did you have to receive a medication for treatment following antibiotic administration? Unknown    What antibiotics have you tolerated in the past? Doxycycline, cephalexin    This patient does not have a recent history of MRSA infection or colonization within the last year.  Based on the results of this review, no side chain similarities between penicillin and cefazolin, in addition to patient reporting tolerating cephalexin in the past, it is recommended for this patient to receive cefazolin as surgical site infection prophylaxis for upcoming hip/knee surgery.     Please call with any questions.    Thank you,  Melissa Kaur, PharmD  [phone]      "

## 2020-01-02 NOTE — PROGRESS NOTES
Subjective:        Cierra Kc is a 79 y.o. female who here for follow up    No chief complaint on file.    She is here today for surgical clearance and hypertension.    HPI     Jair Strickland is a 79-year-old female, who is known to this provider.  She has a history of COPD, CAD, DDD, diastolic CHF, hyperlipidemia, hypertension, and history of stroke.  Stress testing on 6/2018 showed EF 60%, it was a normal myocardial perfusion study with no evidence of ischemia.  Previous echo on 5/2018 showed EF 60%, LAC size is mildly dilated, mild LV regurgitation is present, and no evidence of pericardial effusion.     Denies chest pain, shortness of breath, syncope and near syncope    The following portions of the patient's history were reviewed and updated as appropriate: allergies, current medications, past family history, past medical history, past social history, past surgical history and problem list.    Past Medical History:   Diagnosis Date   • Cervical disc disease    • COPD (chronic obstructive pulmonary disease) (CMS/HCC)    • Coronary artery disease    • DDD (degenerative disc disease), lumbar    • Diastolic CHF (CMS/HCC)    • Hyperlipidemia    • Hypertension    • Rectal abscess    • Stroke (CMS/HCC)    • Vitamin B 12 deficiency          reports that she quit smoking about 29 years ago. She has a 10.00 pack-year smoking history. She has never used smokeless tobacco. She reports that she drinks alcohol. She reports that she does not use drugs.     Family History   Problem Relation Age of Onset   • Hypertension Mother    • Hyperlipidemia Mother    • Hypertension Father    • COPD Father    • Stroke Brother    • Clotting disorder Brother    • Hypertension Brother    • Hyperlipidemia Brother    • Diabetes Maternal Aunt    • Diabetes Maternal Uncle    • Heart disease Maternal Grandmother    • Stroke Maternal Grandmother    • Diabetes Maternal Grandmother        ROS     Review of Systems  Constitutional: No wt loss,  fever, fatigue  Gastrointestinal: No nausea, abdominal pain  Behavioral/Psych: No insomnia or anxiety  Cardiovascular: Denies chest pain, shortness of breath syncope, and near syncope      Objective:           Physical Exam   Constitutional: She is oriented to person, place, and time. She appears well-developed and well-nourished.   HENT:   Head: Normocephalic.   Right Ear: External ear normal.   Left Ear: External ear normal.   Eyes: EOM are normal.   Neck: Normal range of motion. No JVD present.   Cardiovascular: Normal rate, regular rhythm, normal heart sounds and intact distal pulses. Exam reveals no gallop and no friction rub.   No murmur heard.  Pulmonary/Chest: Effort normal and breath sounds normal. No stridor. No respiratory distress. She has no rales.   Abdominal: Soft. Bowel sounds are normal. She exhibits no distension. There is no tenderness. There is no guarding.   Musculoskeletal: Normal range of motion. She exhibits no edema or tenderness.   Right knee swelling   Neurological: She is alert and oriented to person, place, and time. She has normal reflexes.   Skin: Skin is warm.   Psychiatric: She has a normal mood and affect. Judgment normal.   Nursing note and vitals reviewed.        ECG 12 Lead  Date/Time: 1/3/2020 3:44 PM  Performed by: Kimberly Matt APRN  Authorized by: Kimberly Matt APRN   Comparison: compared with previous ECG                        Current Outpatient Medications:   •  amLODIPine (NORVASC) 5 MG tablet, TAKE 1 TABLET EVERY DAY, Disp: 90 tablet, Rfl: 0  •  atorvastatin (LIPITOR) 10 MG tablet, Take 10 mg by mouth Daily., Disp: , Rfl:   •  cholestyramine light (PREVALITE) 4 GM/DOSE powder, Take  by mouth 2 (Two) Times a Day With Meals., Disp: , Rfl:   •  divalproex (DEPAKOTE) 500 MG DR tablet, Take 500 mg by mouth 3 (Three) Times a Day., Disp: , Rfl:   •  famotidine (PEPCID) 20 MG tablet, Take 20 mg by mouth 2 (Two) Times a Day., Disp: , Rfl:   •   HYDROcodone-acetaminophen (NORCO) 7.5-325 MG per tablet, Take 1 tablet by mouth As Needed for Moderate Pain ., Disp: , Rfl:   •  metOLazone (ZAROXOLYN) 5 MG tablet, Take 5 mg by mouth. Every three days, Disp: , Rfl:   •  metoprolol succinate XL (TOPROL-XL) 50 MG 24 hr tablet, Take 50 mg by mouth Daily., Disp: , Rfl:   •  Multiple Vitamins-Minerals (MULTI COMPLETE PO), Take  by mouth., Disp: , Rfl:   •  Naproxen Sod-Diphenhydramine (ALEVE PM PO), Take  by mouth As Needed., Disp: , Rfl:   •  nitroglycerin (NITROSTAT) 0.4 MG SL tablet, Place  under the tongue., Disp: , Rfl:   •  torsemide (DEMADEX) 20 MG tablet, Take 1 tablet by mouth Daily., Disp: 30 tablet, Rfl: 6  •  TURMERIC PO, Take  by mouth., Disp: , Rfl:      Assessment:        Patient Active Problem List   Diagnosis   • Abnormal finding on thallium stress test   • Chest pain   • Atherosclerosis of coronary artery   • Essential hypertension   • Breath shortness   • Unstable angina pectoris (CMS/HCC)   • Disorder of joint   • History of cardiac catheterization   • Abscess of rectum   • Lumbar radiculopathy   • Degeneration of intervertebral disc of lumbar region   • Infected sebaceous cyst   • Cerebral hemorrhage (CMS/HCC)   • Hyperlipidemia   • Chronic diastolic heart failure (CMS/HCC)   • Depression   • Sebaceous cyst of skin of breast   • Moderate COPD (chronic obstructive pulmonary disease) (CMS/HCC)   • Chronic obstructive pulmonary disease (CMS/HCC)   • Community acquired pneumonia   • Chronic low back pain   • Abscess   • Hyperlipemia   • Acute non-ST-elevation myocardial infarction (CMS/HCC)   • Dyslipidemia   • Leg swelling               Plan:   1.  Cardiac clearance: Previous ischemic work-up as above.  Will need Lexiscan and echo.  He is unable to do the treadmill or she is going to have right knee surgery.  She uses a cane.    It was explained to the patient that stress testing carries 85% specificity/sensitivity, and does not rule out future cardiac  event.  Risks of the procedure were explained to the patient including shortness of breath, induction of myocardial infarction, and dizziness.  Patient is agreeable to proceeding with stress testing.     2.  Essential hypertension:Today in the office her blood pressure is lightly elevated. She states that that her blood pressure is at her at home.    Educated patient on exercising for at least 30 minutes a day for 2 to 3 days a week. Importance of controlling hypertension and blood pressure checkup on the regular basis has been explained. Hypertension as a silent killer has been discussed. Risk reduction of the weight and regular exercises to control the hypertension has been explained.    3.  Hyperlipidemia: Last lipid profile on 5/11/2018 showed triglycerides 198, HDL 51, LDL 88, and total cholesterol 179.  Continue statin.  He does his lab work with his primary care physician and he manages his cholesterol.    Risk of the hyperlipidemia, importance of the treatment has been explained. Pros and cons of the statins has been explained. Regular blood workup as well as side effects including the liver failure, myelopathy death has been explained.     4. CAD: Denies chest pain.  Previous stress test as above.    Risk reduction for the coronary artery disease, controlling the blood pressure, blood sugar management, cholesterol management, exercise, stress management, and proper compliance with medications and follow-up has been discussed    No diagnosis found.    There are no diagnoses linked to this encounter.    COUNSELING:    Cierra Dick was given to patient for the following topics: diagnostic results, risk factor reductions, impressions, risks and benefits of treatment options and importance of treatment compliance .       SMOKING COUNSELING: Denies    She will have a Lexiscan, echo and follow-up with Dr. Dumont   for results.    Sincerely,   BLACK Murcia  [facility]  01/02/20  3:11 PM     EMR Dragon/Transcription disclaimer:   Much of this encounter note is an electronic transcription/translation of spoken language to printed text. The electronic translation of spoken language may permit erroneous, or at times, nonsensical words or phrases to be inadvertently transcribed; Although I have reviewed the note for such errors, some may still exist.

## 2020-01-03 ENCOUNTER — OFFICE VISIT (OUTPATIENT)
Dept: CARDIOLOGY | Facility: CLINIC | Age: 80
End: 2020-01-03

## 2020-01-03 VITALS
SYSTOLIC BLOOD PRESSURE: 146 MMHG | BODY MASS INDEX: 33.36 KG/M2 | HEART RATE: 59 BPM | WEIGHT: 233 LBS | HEIGHT: 70 IN | DIASTOLIC BLOOD PRESSURE: 75 MMHG

## 2020-01-03 DIAGNOSIS — E78.5 HYPERLIPIDEMIA, UNSPECIFIED HYPERLIPIDEMIA TYPE: ICD-10-CM

## 2020-01-03 DIAGNOSIS — I50.32 CHRONIC DIASTOLIC HEART FAILURE (HCC): ICD-10-CM

## 2020-01-03 DIAGNOSIS — R94.31 ABNORMAL EKG: Primary | ICD-10-CM

## 2020-01-03 DIAGNOSIS — Z01.818 PRE-OPERATIVE EXAM: ICD-10-CM

## 2020-01-03 DIAGNOSIS — I25.10 CORONARY ARTERY DISEASE INVOLVING NATIVE CORONARY ARTERY OF NATIVE HEART WITHOUT ANGINA PECTORIS: ICD-10-CM

## 2020-01-03 DIAGNOSIS — I10 ESSENTIAL HYPERTENSION: ICD-10-CM

## 2020-01-03 PROCEDURE — 99214 OFFICE O/P EST MOD 30 MIN: CPT | Performed by: NURSE PRACTITIONER

## 2020-01-03 PROCEDURE — 93000 ELECTROCARDIOGRAM COMPLETE: CPT | Performed by: NURSE PRACTITIONER

## 2020-01-03 RX ORDER — SACCHAROMYCES BOULARDII 250 MG
250 CAPSULE ORAL 2 TIMES DAILY
COMMUNITY

## 2020-01-03 RX ORDER — PAROXETINE HYDROCHLORIDE 20 MG/1
20 TABLET, FILM COATED ORAL EVERY MORNING
COMMUNITY
End: 2020-01-21

## 2020-01-03 RX ORDER — FUROSEMIDE 20 MG/1
20 TABLET ORAL AS NEEDED
COMMUNITY

## 2020-01-07 ENCOUNTER — HOSPITAL ENCOUNTER (OUTPATIENT)
Dept: CARDIOLOGY | Facility: HOSPITAL | Age: 80
Discharge: HOME | End: 2020-01-07
Admitting: NURSE PRACTITIONER

## 2020-01-07 VITALS
WEIGHT: 233 LBS | DIASTOLIC BLOOD PRESSURE: 82 MMHG | HEART RATE: 63 BPM | SYSTOLIC BLOOD PRESSURE: 178 MMHG | HEIGHT: 70 IN | BODY MASS INDEX: 33.36 KG/M2

## 2020-01-07 PROCEDURE — 93306 TTE W/DOPPLER COMPLETE: CPT | Performed by: INTERNAL MEDICINE

## 2020-01-07 PROCEDURE — 93306 TTE W/DOPPLER COMPLETE: CPT

## 2020-01-09 LAB
BH CV ECHO MEAS - ACS: 2.1 CM
BH CV ECHO MEAS - AO MAX PG (FULL): 2.5 MMHG
BH CV ECHO MEAS - AO MAX PG: 5.6 MMHG
BH CV ECHO MEAS - AO MEAN PG (FULL): 2 MMHG
BH CV ECHO MEAS - AO MEAN PG: 3 MMHG
BH CV ECHO MEAS - AO ROOT AREA (BSA CORRECTED): 1.4
BH CV ECHO MEAS - AO ROOT AREA: 7.5 CM^2
BH CV ECHO MEAS - AO ROOT DIAM: 3.1 CM
BH CV ECHO MEAS - AO V2 MAX: 118 CM/SEC
BH CV ECHO MEAS - AO V2 MEAN: 85.5 CM/SEC
BH CV ECHO MEAS - AO V2 VTI: 27.3 CM
BH CV ECHO MEAS - AVA(I,A): 2.5 CM^2
BH CV ECHO MEAS - AVA(I,D): 2.5 CM^2
BH CV ECHO MEAS - AVA(V,A): 2.6 CM^2
BH CV ECHO MEAS - AVA(V,D): 2.6 CM^2
BH CV ECHO MEAS - BSA(HAYCOCK): 2.3 M^2
BH CV ECHO MEAS - BSA: 2.2 M^2
BH CV ECHO MEAS - BZI_BMI: 33.4 KILOGRAMS/M^2
BH CV ECHO MEAS - BZI_METRIC_HEIGHT: 177.8 CM
BH CV ECHO MEAS - BZI_METRIC_WEIGHT: 105.7 KG
BH CV ECHO MEAS - EDV(CUBED): 103.8 ML
BH CV ECHO MEAS - EDV(MOD-SP2): 72 ML
BH CV ECHO MEAS - EDV(MOD-SP4): 114 ML
BH CV ECHO MEAS - EDV(TEICH): 102.4 ML
BH CV ECHO MEAS - EF(CUBED): 58.7 %
BH CV ECHO MEAS - EF(MOD-BP): 63 %
BH CV ECHO MEAS - EF(MOD-SP2): 61.1 %
BH CV ECHO MEAS - EF(MOD-SP4): 65.8 %
BH CV ECHO MEAS - EF(TEICH): 50.3 %
BH CV ECHO MEAS - ESV(CUBED): 42.9 ML
BH CV ECHO MEAS - ESV(MOD-SP2): 28 ML
BH CV ECHO MEAS - ESV(MOD-SP4): 39 ML
BH CV ECHO MEAS - ESV(TEICH): 50.9 ML
BH CV ECHO MEAS - FS: 25.5 %
BH CV ECHO MEAS - IVS/LVPW: 1
BH CV ECHO MEAS - IVSD: 1.2 CM
BH CV ECHO MEAS - LA DIMENSION: 4.1 CM
BH CV ECHO MEAS - LA/AO: 1.3
BH CV ECHO MEAS - LAT PEAK E' VEL: 9.3 CM/SEC
BH CV ECHO MEAS - LV DIASTOLIC VOL/BSA (35-75): 51.2 ML/M^2
BH CV ECHO MEAS - LV MASS(C)D: 212 GRAMS
BH CV ECHO MEAS - LV MASS(C)DI: 95.2 GRAMS/M^2
BH CV ECHO MEAS - LV MAX PG: 3 MMHG
BH CV ECHO MEAS - LV MEAN PG: 1 MMHG
BH CV ECHO MEAS - LV SYSTOLIC VOL/BSA (12-30): 17.5 ML/M^2
BH CV ECHO MEAS - LV V1 MAX: 87.2 CM/SEC
BH CV ECHO MEAS - LV V1 MEAN: 56.5 CM/SEC
BH CV ECHO MEAS - LV V1 VTI: 19.7 CM
BH CV ECHO MEAS - LVIDD: 4.7 CM
BH CV ECHO MEAS - LVIDS: 3.5 CM
BH CV ECHO MEAS - LVLD AP2: 6.7 CM
BH CV ECHO MEAS - LVLD AP4: 6.7 CM
BH CV ECHO MEAS - LVLS AP2: 5.7 CM
BH CV ECHO MEAS - LVLS AP4: 5.5 CM
BH CV ECHO MEAS - LVOT AREA (M): 3.5 CM^2
BH CV ECHO MEAS - LVOT AREA: 3.5 CM^2
BH CV ECHO MEAS - LVOT DIAM: 2.1 CM
BH CV ECHO MEAS - LVPWD: 1.2 CM
BH CV ECHO MEAS - MED PEAK E' VEL: 9.4 CM/SEC
BH CV ECHO MEAS - MV A DUR: 0.18 SEC
BH CV ECHO MEAS - MV A MAX VEL: 89.7 CM/SEC
BH CV ECHO MEAS - MV DEC SLOPE: 349 CM/SEC^2
BH CV ECHO MEAS - MV DEC TIME: 0.25 SEC
BH CV ECHO MEAS - MV E MAX VEL: 66 CM/SEC
BH CV ECHO MEAS - MV E/A: 0.74
BH CV ECHO MEAS - MV MAX PG: 2.8 MMHG
BH CV ECHO MEAS - MV MEAN PG: 1 MMHG
BH CV ECHO MEAS - MV P1/2T MAX VEL: 84.3 CM/SEC
BH CV ECHO MEAS - MV P1/2T: 70.7 MSEC
BH CV ECHO MEAS - MV V2 MAX: 83.6 CM/SEC
BH CV ECHO MEAS - MV V2 MEAN: 49.5 CM/SEC
BH CV ECHO MEAS - MV V2 VTI: 28.1 CM
BH CV ECHO MEAS - MVA P1/2T LCG: 2.6 CM^2
BH CV ECHO MEAS - MVA(P1/2T): 3.1 CM^2
BH CV ECHO MEAS - MVA(VTI): 2.4 CM^2
BH CV ECHO MEAS - PA ACC TIME: 0.12 SEC
BH CV ECHO MEAS - PA MAX PG (FULL): 1.7 MMHG
BH CV ECHO MEAS - PA MAX PG: 3.2 MMHG
BH CV ECHO MEAS - PA PR(ACCEL): 26.8 MMHG
BH CV ECHO MEAS - PA V2 MAX: 89.6 CM/SEC
BH CV ECHO MEAS - PULM A REVS DUR: 0.14 SEC
BH CV ECHO MEAS - PULM A REVS VEL: 32.2 CM/SEC
BH CV ECHO MEAS - PULM DIAS VEL: 41 CM/SEC
BH CV ECHO MEAS - PULM S/D: 1.6
BH CV ECHO MEAS - PULM SYS VEL: 66.7 CM/SEC
BH CV ECHO MEAS - PVA(V,A): 2.6 CM^2
BH CV ECHO MEAS - PVA(V,D): 2.6 CM^2
BH CV ECHO MEAS - QP/QS: 0.85
BH CV ECHO MEAS - RAP SYSTOLE: 3 MMHG
BH CV ECHO MEAS - RV MAX PG: 1.5 MMHG
BH CV ECHO MEAS - RV MEAN PG: 1 MMHG
BH CV ECHO MEAS - RV V1 MAX: 60.6 CM/SEC
BH CV ECHO MEAS - RV V1 MEAN: 44.2 CM/SEC
BH CV ECHO MEAS - RV V1 VTI: 15.2 CM
BH CV ECHO MEAS - RVOT AREA: 3.8 CM^2
BH CV ECHO MEAS - RVOT DIAM: 2.2 CM
BH CV ECHO MEAS - RVSP: 26.4 MMHG
BH CV ECHO MEAS - SI(AO): 92.5 ML/M^2
BH CV ECHO MEAS - SI(CUBED): 27.4 ML/M^2
BH CV ECHO MEAS - SI(LVOT): 30.6 ML/M^2
BH CV ECHO MEAS - SI(MOD-SP2): 19.8 ML/M^2
BH CV ECHO MEAS - SI(MOD-SP4): 33.7 ML/M^2
BH CV ECHO MEAS - SI(TEICH): 23.1 ML/M^2
BH CV ECHO MEAS - SV(AO): 206.1 ML
BH CV ECHO MEAS - SV(CUBED): 60.9 ML
BH CV ECHO MEAS - SV(LVOT): 68.2 ML
BH CV ECHO MEAS - SV(MOD-SP2): 44 ML
BH CV ECHO MEAS - SV(MOD-SP4): 75 ML
BH CV ECHO MEAS - SV(RVOT): 57.8 ML
BH CV ECHO MEAS - SV(TEICH): 51.5 ML
BH CV ECHO MEAS - TAPSE (>1.6): 1.9 CM
BH CV ECHO MEAS - TR MAX VEL: 242 CM/SEC
BH CV ECHO MEASUREMENTS AVERAGE E/E' RATIO: 7.06
BH CV XLRA - RV BASE: 2.7 CM
BH CV XLRA - RV LENGTH: 6.9 CM
BH CV XLRA - RV MID: 2.3 CM
BH CV XLRA - TDI S': 14 CM/SEC
LEFT ATRIUM VOLUME INDEX: 30 ML/M2
MAXIMAL PREDICTED HEART RATE: 141 BPM
STRESS TARGET HR: 120 BPM

## 2020-01-18 ENCOUNTER — HOSPITAL ENCOUNTER (EMERGENCY)
Facility: HOSPITAL | Age: 80
Discharge: HOME | End: 2020-01-18
Attending: EMERGENCY MEDICINE | Admitting: EMERGENCY MEDICINE

## 2020-01-18 ENCOUNTER — APPOINTMENT (OUTPATIENT)
Dept: CT IMAGING | Facility: HOSPITAL | Age: 80
End: 2020-01-18

## 2020-01-18 VITALS
DIASTOLIC BLOOD PRESSURE: 61 MMHG | TEMPERATURE: 98 F | HEIGHT: 70 IN | HEART RATE: 68 BPM | RESPIRATION RATE: 16 BRPM | SYSTOLIC BLOOD PRESSURE: 151 MMHG | BODY MASS INDEX: 32.78 KG/M2 | WEIGHT: 229 LBS | OXYGEN SATURATION: 98 %

## 2020-01-18 DIAGNOSIS — S09.90XA INJURY OF HEAD, INITIAL ENCOUNTER: Primary | ICD-10-CM

## 2020-01-18 PROCEDURE — 70450 CT HEAD/BRAIN W/O DYE: CPT

## 2020-01-18 PROCEDURE — 99283 EMERGENCY DEPT VISIT LOW MDM: CPT

## 2020-01-18 RX ORDER — ACETAMINOPHEN 500 MG
1000 TABLET ORAL ONCE
Status: COMPLETED | OUTPATIENT
Start: 2020-01-18 | End: 2020-01-18

## 2020-01-18 RX ADMIN — ACETAMINOPHEN 1000 MG: 500 TABLET, FILM COATED ORAL at 14:06

## 2020-01-21 ENCOUNTER — HOSPITAL ENCOUNTER (OUTPATIENT)
Dept: GENERAL RADIOLOGY | Facility: HOSPITAL | Age: 80
Discharge: HOME | End: 2020-01-21
Admitting: ORTHOPAEDIC SURGERY

## 2020-01-21 ENCOUNTER — HOSPITAL ENCOUNTER (OUTPATIENT)
Dept: GENERAL RADIOLOGY | Facility: HOSPITAL | Age: 80
Discharge: HOME | End: 2020-01-21

## 2020-01-21 ENCOUNTER — APPOINTMENT (OUTPATIENT)
Dept: PREADMISSION TESTING | Facility: HOSPITAL | Age: 80
End: 2020-01-21

## 2020-01-21 VITALS
DIASTOLIC BLOOD PRESSURE: 87 MMHG | BODY MASS INDEX: 33.01 KG/M2 | HEART RATE: 68 BPM | SYSTOLIC BLOOD PRESSURE: 134 MMHG | WEIGHT: 230.6 LBS | TEMPERATURE: 97.6 F | HEIGHT: 70 IN | RESPIRATION RATE: 16 BRPM | OXYGEN SATURATION: 97 %

## 2020-01-21 LAB
ALBUMIN SERPL-MCNC: 3.8 G/DL (ref 3.5–5.2)
ALBUMIN/GLOB SERPL: 1.3 G/DL
ALP SERPL-CCNC: 83 U/L (ref 39–117)
ALT SERPL W P-5'-P-CCNC: 14 U/L (ref 1–33)
ANION GAP SERPL CALCULATED.3IONS-SCNC: 11.6 MMOL/L (ref 5–15)
APTT PPP: 27.3 SECONDS (ref 22.7–35.4)
AST SERPL-CCNC: 12 U/L (ref 1–32)
BACTERIA UR QL AUTO: ABNORMAL /HPF
BASOPHILS # BLD AUTO: 0.08 10*3/MM3 (ref 0–0.2)
BASOPHILS NFR BLD AUTO: 1.5 % (ref 0–1.5)
BILIRUB SERPL-MCNC: 0.5 MG/DL (ref 0.2–1.2)
BILIRUB UR QL STRIP: NEGATIVE
BUN BLD-MCNC: 18 MG/DL (ref 8–23)
BUN/CREAT SERPL: 23.7 (ref 7–25)
CALCIUM SPEC-SCNC: 9.7 MG/DL (ref 8.6–10.5)
CHLORIDE SERPL-SCNC: 100 MMOL/L (ref 98–107)
CLARITY UR: CLEAR
CO2 SERPL-SCNC: 29.4 MMOL/L (ref 22–29)
COLOR UR: YELLOW
CREAT BLD-MCNC: 0.76 MG/DL (ref 0.57–1)
DEPRECATED RDW RBC AUTO: 38.9 FL (ref 37–54)
EOSINOPHIL # BLD AUTO: 0.19 10*3/MM3 (ref 0–0.4)
EOSINOPHIL NFR BLD AUTO: 3.7 % (ref 0.3–6.2)
ERYTHROCYTE [DISTWIDTH] IN BLOOD BY AUTOMATED COUNT: 12.8 % (ref 12.3–15.4)
GFR SERPL CREATININE-BSD FRML MDRD: 73 ML/MIN/1.73
GLOBULIN UR ELPH-MCNC: 3 GM/DL
GLUCOSE BLD-MCNC: 95 MG/DL (ref 65–99)
GLUCOSE UR STRIP-MCNC: NEGATIVE MG/DL
HCT VFR BLD AUTO: 38.1 % (ref 34–46.6)
HGB BLD-MCNC: 12.5 G/DL (ref 12–15.9)
HGB UR QL STRIP.AUTO: NEGATIVE
HYALINE CASTS UR QL AUTO: ABNORMAL /LPF
IMM GRANULOCYTES # BLD AUTO: 0.02 10*3/MM3 (ref 0–0.05)
IMM GRANULOCYTES NFR BLD AUTO: 0.4 % (ref 0–0.5)
INR PPP: 0.95 (ref 0.9–1.1)
KETONES UR QL STRIP: ABNORMAL
LEUKOCYTE ESTERASE UR QL STRIP.AUTO: NEGATIVE
LYMPHOCYTES # BLD AUTO: 1.43 10*3/MM3 (ref 0.7–3.1)
LYMPHOCYTES NFR BLD AUTO: 27.7 % (ref 19.6–45.3)
MCH RBC QN AUTO: 27.8 PG (ref 26.6–33)
MCHC RBC AUTO-ENTMCNC: 32.8 G/DL (ref 31.5–35.7)
MCV RBC AUTO: 84.7 FL (ref 79–97)
MONOCYTES # BLD AUTO: 0.43 10*3/MM3 (ref 0.1–0.9)
MONOCYTES NFR BLD AUTO: 8.3 % (ref 5–12)
NEUTROPHILS # BLD AUTO: 3.02 10*3/MM3 (ref 1.7–7)
NEUTROPHILS NFR BLD AUTO: 58.4 % (ref 42.7–76)
NITRITE UR QL STRIP: NEGATIVE
NRBC BLD AUTO-RTO: 0 /100 WBC (ref 0–0.2)
PH UR STRIP.AUTO: 5.5 [PH] (ref 5–8)
PLATELET # BLD AUTO: 199 10*3/MM3 (ref 140–450)
PMV BLD AUTO: 10.6 FL (ref 6–12)
POTASSIUM BLD-SCNC: 4 MMOL/L (ref 3.5–5.2)
PROT SERPL-MCNC: 6.8 G/DL (ref 6–8.5)
PROT UR QL STRIP: NEGATIVE
PROTHROMBIN TIME: 12.4 SECONDS (ref 11.7–14.2)
RBC # BLD AUTO: 4.5 10*6/MM3 (ref 3.77–5.28)
RBC # UR: ABNORMAL /HPF
REF LAB TEST METHOD: ABNORMAL
SODIUM BLD-SCNC: 141 MMOL/L (ref 136–145)
SP GR UR STRIP: >=1.03 (ref 1–1.03)
SQUAMOUS #/AREA URNS HPF: ABNORMAL /HPF
UROBILINOGEN UR QL STRIP: ABNORMAL
WBC NRBC COR # BLD: 5.17 10*3/MM3 (ref 3.4–10.8)
WBC UR QL AUTO: ABNORMAL /HPF

## 2020-01-21 PROCEDURE — 85610 PROTHROMBIN TIME: CPT | Performed by: ORTHOPAEDIC SURGERY

## 2020-01-21 PROCEDURE — A9270 NON-COVERED ITEM OR SERVICE: HCPCS | Performed by: ORTHOPAEDIC SURGERY

## 2020-01-21 PROCEDURE — 63710000001 MUPIROCIN 2 % OINTMENT: Performed by: ORTHOPAEDIC SURGERY

## 2020-01-21 PROCEDURE — 80053 COMPREHEN METABOLIC PANEL: CPT | Performed by: ORTHOPAEDIC SURGERY

## 2020-01-21 PROCEDURE — 36415 COLL VENOUS BLD VENIPUNCTURE: CPT

## 2020-01-21 PROCEDURE — 71046 X-RAY EXAM CHEST 2 VIEWS: CPT

## 2020-01-21 PROCEDURE — 85730 THROMBOPLASTIN TIME PARTIAL: CPT | Performed by: ORTHOPAEDIC SURGERY

## 2020-01-21 PROCEDURE — 73560 X-RAY EXAM OF KNEE 1 OR 2: CPT

## 2020-01-21 PROCEDURE — 85025 COMPLETE CBC W/AUTO DIFF WBC: CPT | Performed by: ORTHOPAEDIC SURGERY

## 2020-01-21 PROCEDURE — 81001 URINALYSIS AUTO W/SCOPE: CPT | Performed by: ORTHOPAEDIC SURGERY

## 2020-01-21 RX ORDER — AMLODIPINE BESYLATE 5 MG/1
5 TABLET ORAL NIGHTLY
COMMUNITY

## 2020-01-21 RX ORDER — PAROXETINE HYDROCHLORIDE 20 MG/1
20 TABLET, FILM COATED ORAL NIGHTLY
COMMUNITY

## 2020-01-21 RX ORDER — ACETAMINOPHEN 500 MG
500 TABLET ORAL EVERY 6 HOURS PRN
COMMUNITY
End: 2020-02-08 | Stop reason: HOSPADM

## 2020-01-21 ASSESSMENT — KOOS JR
KOOS JR SCORE: 44.905
KOOS JR SCORE: 17

## 2020-01-21 NOTE — DISCHARGE INSTRUCTIONS
Take the following medications the morning of surgery:  PEPCID    PLEASE ARRIVE AT THE HOSPITAL AT 9 AM ON February 6, 2020    General Instructions:  • Do not eat solid food after midnight the night before surgery.  • You may drink clear liquids day of surgery but must stop at least one hour before your hospital arrival time.  • NOTHING TO DRINK AFTER 8 AM  • It is beneficial for you to have a clear drink that contains carbohydrates the day of surgery.  We suggest a 12 to 20 ounce bottle of Gatorade or Powerade for non-diabetic patients or a 12 to 20 ounce bottle of G2 or Powerade Zero for diabetic patients. (Pediatric patients, are not advised to drink a 12 to 20 ounce carbohydrate drink)    Clear liquids are liquids you can see through.  Nothing red in color.     Plain water                               Sports drinks  Sodas                                   Gelatin (Jell-O)  Fruit juices without pulp such as white grape juice and apple juice  Popsicles that contain no fruit or yogurt  Tea or coffee (no cream or milk added)  Gatorade / Powerade  G2 / Powerade Zero    • Infants may have breast milk up to four hours before surgery.  • Infants drinking formula may drink formula up to six hours before surgery.   • Patients who avoid smoking, chewing tobacco and alcohol for 4 weeks prior to surgery have a reduced risk of post-operative complications.  Quit smoking as many days before surgery as you can.  • Do not smoke, use chewing tobacco or drink alcohol the day of surgery.   • If applicable bring your C-PAP/ BI-PAP machine.  • Bring any papers given to you in the doctor’s office.  • Wear clean comfortable clothes.  • Do not wear contact lenses, false eyelashes or make-up.  Bring a case for your glasses.   • Bring crutches or walker if applicable.  • Remove all piercings.  Leave jewelry and any other valuables at home.  • Hair extensions with metal clips must be removed prior to surgery.  • The Pre-Admission Testing  nurse will instruct you to bring medications if unable to obtain an accurate list in Pre-Admission Testing.      Preventing a Surgical Site Infection:  • For 2 to 3 days before surgery, avoid shaving with a razor because the razor can irritate skin and make it easier to develop an infection.    • Any areas of open skin can increase the risk of a post-operative wound infection by allowing bacteria to enter and travel throughout the body.  Notify your surgeon if you have any skin wounds / rashes even if it is not near the expected surgical site.  The area will need assessed to determine if surgery should be delayed until it is healed.  • The night prior to surgery sleep in a clean bed with clean clothing.  Do not allow pets to sleep with you.  • Shower on the morning of surgery using a fresh bar of anti-bacterial soap (such as Dial) and clean washcloth.  Dry with a clean towel and dress in clean clothing.  • Ask your surgeon if you will be receiving antibiotics prior to surgery.  • Make sure you, your family, and all healthcare providers clean their hands with soap and water or an alcohol based hand  before caring for you or your wound.    Day of surgery:  Your arrival time is approximately two hours before your scheduled surgery time.  Upon arrival, a Pre-op nurse and Anesthesiologist will review your health history, obtain vital signs, and answer questions you may have.  The only belongings needed at this time will be a list of your home medications and if applicable your C-PAP/BI-PAP machine.  If you are staying overnight your family can leave the rest of your belongings in the car and bring them to your room later.  A Pre-op nurse will start an IV and you may receive medication in preparation for surgery, including something to help you relax.  Your family will be able to see you in the Pre-op area.  Two visitors at a time will be allowed in the Pre-op room.  While you are in surgery your family should  notify the waiting room  if they leave the waiting room area and provide a contact phone number.      2% CHLORHEXIDINE GLUCONATE* CLOTH  Preparing or “prepping” skin before surgery can reduce the risk of infection at the surgical site. To make the process easier, [facility] has chosen disposable cloths moistened with a rinse-free, 2% Chlorhexidine Gluconate (CHG) antiseptic solution. The steps below outline the prepping process and should be carefully followed.        Use the prep cloth on the area that is circled in the diagram             Directions Night before Surgery  1) Shower using a fresh bar of anti-bacterial soap (such as Dial) and clean washcloth.  Use a clean towel to completely dry your skin.  2) Do not use any lotions, oils or creams on your skin.  3) Open the package and remove 1 cloth, wipe your skin for 30 seconds in a circular motion.  Allow to dry for 3 minutes.  4) Repeat #3 with second cloth.  5) Do not touch your eyes, ears, or mouth with the prep cloth.  6) Allow the wet prep solution to air dry.  7) Discard the prep cloth and wash your hands with soap and water.   8) Dress in clean bed clothes and sleep on fresh clean bed sheets.   9) You may experience some temporary itching after the prep.    Directions Day of Surgery  1) Repeat steps 1,2,3,4,5,6,7, and 9.   2) Dress in clean clothes before coming to the hospital.    BACTROBAN NASAL OINTMENT  There are many germs normally in your nose. Bactroban is an ointment that will help reduce these germs. Please follow these instructions for Bactroban use:      ____The day before surgery in the morning  Date________    ____The day before surgery in the evening              Date________    ____The day of surgery in the morning    Date________    **Squirt ½ package of Bactroban Ointment onto a cotton applicator and apply to inside of 1st nostril.  Squirt the remaining Bactroban and apply to the inside of the other  nostril.    Please be aware that surgery does come with discomfort.  We want to make every effort to control your discomfort so please discuss any uncontrolled symptoms with your nurse.   Your doctor will most likely have prescribed pain medications.      If you are going home after surgery you will receive individualized written care instructions before being discharged.  A responsible adult must drive you to and from the hospital on the day of your surgery and stay with you for 24 hours.    If you are staying overnight following surgery, you will be transported to your hospital room following the recovery period.  [facility] has all private rooms.    If you have any questions please call Pre-Admission Testing at [phone].  Deductibles and co-payments are collected on the day of service. Please be prepared to pay the required co-pay, deductible or deposit on the day of service as defined by your plan.

## 2020-01-27 ENCOUNTER — HOSPITAL ENCOUNTER (OUTPATIENT)
Dept: CARDIOLOGY | Facility: HOSPITAL | Age: 80
Discharge: HOME | End: 2020-01-27

## 2020-01-27 VITALS
HEIGHT: 70 IN | SYSTOLIC BLOOD PRESSURE: 141 MMHG | DIASTOLIC BLOOD PRESSURE: 68 MMHG | RESPIRATION RATE: 16 BRPM | WEIGHT: 229 LBS | OXYGEN SATURATION: 98 % | BODY MASS INDEX: 32.78 KG/M2 | HEART RATE: 65 BPM

## 2020-01-27 LAB
BH CV STRESS BP STAGE 1: NORMAL
BH CV STRESS COMMENTS STAGE 1: NORMAL
BH CV STRESS DOSE REGADENOSON STAGE 1: 0.4
BH CV STRESS DURATION MIN STAGE 1: 1
BH CV STRESS DURATION SEC STAGE 1: 0
BH CV STRESS HR STAGE 1: 77
BH CV STRESS O2 STAGE 1: 100
BH CV STRESS PROTOCOL 1: NORMAL
BH CV STRESS RECOVERY BP: NORMAL MMHG
BH CV STRESS RECOVERY HR: 74 BPM
BH CV STRESS RECOVERY O2: 98 %
BH CV STRESS STAGE 1: 1
LV EF NUC BP: 71 %
MAXIMAL PREDICTED HEART RATE: 141 BPM
PERCENT MAX PREDICTED HR: 54.61 %
STRESS BASELINE BP: NORMAL MMHG
STRESS BASELINE HR: 65 BPM
STRESS O2 SAT REST: 98 %
STRESS PERCENT HR: 64 %
STRESS POST ESTIMATED WORKLOAD: 1 METS
STRESS POST EXERCISE DUR MIN: 1 MIN
STRESS POST EXERCISE DUR SEC: 0 SEC
STRESS POST O2 SAT PEAK: 100 %
STRESS POST PEAK BP: NORMAL MMHG
STRESS POST PEAK HR: 77 BPM
STRESS TARGET HR: 120 BPM

## 2020-01-27 PROCEDURE — 0 TECHNETIUM SESTAMIBI: Performed by: INTERNAL MEDICINE

## 2020-01-27 PROCEDURE — 93016 CV STRESS TEST SUPVJ ONLY: CPT | Performed by: INTERNAL MEDICINE

## 2020-01-27 PROCEDURE — 78452 HT MUSCLE IMAGE SPECT MULT: CPT

## 2020-01-27 PROCEDURE — A9500 TC99M SESTAMIBI: HCPCS | Performed by: INTERNAL MEDICINE

## 2020-01-27 PROCEDURE — 93017 CV STRESS TEST TRACING ONLY: CPT

## 2020-01-27 PROCEDURE — 25010000002 REGADENOSON 0.4 MG/5ML SOLUTION: Performed by: INTERNAL MEDICINE

## 2020-01-27 PROCEDURE — 93018 CV STRESS TEST I&R ONLY: CPT | Performed by: INTERNAL MEDICINE

## 2020-01-27 PROCEDURE — 78452 HT MUSCLE IMAGE SPECT MULT: CPT | Performed by: INTERNAL MEDICINE

## 2020-01-27 RX ORDER — DIVALPROEX SODIUM 500 MG/1
500 TABLET, DELAYED RELEASE ORAL DAILY
COMMUNITY
Start: 2020-01-21 | End: 2020-01-27 | Stop reason: SDUPTHER

## 2020-01-27 RX ADMIN — TECHNETIUM TC 99M SESTAMIBI 1 DOSE: 1 INJECTION INTRAVENOUS at 07:41

## 2020-01-27 RX ADMIN — REGADENOSON 0.4 MG: 0.08 INJECTION, SOLUTION INTRAVENOUS at 10:43

## 2020-01-27 RX ADMIN — TECHNETIUM TC 99M SESTAMIBI 1 DOSE: 1 INJECTION INTRAVENOUS at 10:43

## 2020-02-03 ENCOUNTER — OFFICE VISIT (OUTPATIENT)
Dept: CARDIOLOGY | Facility: CLINIC | Age: 80
End: 2020-02-03

## 2020-02-03 VITALS
WEIGHT: 232 LBS | HEART RATE: 94 BPM | HEIGHT: 70 IN | SYSTOLIC BLOOD PRESSURE: 168 MMHG | DIASTOLIC BLOOD PRESSURE: 80 MMHG | BODY MASS INDEX: 33.21 KG/M2

## 2020-02-03 DIAGNOSIS — I25.10 CORONARY ARTERY DISEASE INVOLVING NATIVE CORONARY ARTERY OF NATIVE HEART WITHOUT ANGINA PECTORIS: Primary | ICD-10-CM

## 2020-02-03 DIAGNOSIS — E78.5 HYPERLIPIDEMIA, UNSPECIFIED HYPERLIPIDEMIA TYPE: ICD-10-CM

## 2020-02-03 DIAGNOSIS — I10 ESSENTIAL HYPERTENSION: ICD-10-CM

## 2020-02-03 DIAGNOSIS — I25.10 ATHEROSCLEROSIS OF NATIVE CORONARY ARTERY OF NATIVE HEART WITHOUT ANGINA PECTORIS: ICD-10-CM

## 2020-02-03 PROCEDURE — 99213 OFFICE O/P EST LOW 20 MIN: CPT | Performed by: INTERNAL MEDICINE

## 2020-02-03 NOTE — PROGRESS NOTES
Subjective:        Cierra Kc is a 79 y.o. female who here for follow up    CC  Weakness    Knee surg clearance  HPI  79-year-old female with known history of the coronary artery disease, benign essential arterial hypertension hyperlipidemia here for the knee surgery clearance as well as complaining of the weakness denies any chest pains tightness heaviness of the pressure sensation     Problem List Items Addressed This Visit        Cardiovascular and Mediastinum    Atherosclerosis of coronary artery    Essential hypertension    Hyperlipidemia      Other Visit Diagnoses     Coronary artery disease involving native coronary artery of native heart without angina pectoris    -  Primary        .Interpretation Summary        · Findings consistent with a normal ECG stress test.  · Left ventricular ejection fraction is hyperdynamic (Calculated EF > 70%).  · Myocardial perfusion imaging indicates a normal myocardial perfusion study with no evidence of ischemia.  · Impressions are consistent with a low risk study.       Interpretation Summary     · Right ventricular cavity is mildly dilated.  · Calculated EF = 63%.  · There is no evidence of pericardial effusion.       The following portions of the patient's history were reviewed and updated as appropriate: allergies, current medications, past family history, past medical history, past social history, past surgical history and problem list.    Past Medical History:   Diagnosis Date   • Cervical disc disease    • COPD (chronic obstructive pulmonary disease) (CMS/[facility])    • Coronary artery disease    • DDD (degenerative disc disease), lumbar    • Diastolic CHF (CMS/HCC)    • GERD (gastroesophageal reflux disease)    • History of DVT of lower extremity 1970s, 1980s    x 2   • Hyperlipidemia    • Hypertension    • Rectal abscess    • Seizures (CMS/HCC)    • Stress incontinence    • Stroke (CMS/HCC) 2001   • Vitamin B 12 deficiency      reports that she quit smoking about 29  "years ago. She has a 10.00 pack-year smoking history. She has never used smokeless tobacco. She reports that she drinks alcohol. She reports that she does not use drugs.   Family History   Problem Relation Age of Onset   • Hypertension Mother    • Hyperlipidemia Mother    • Hypertension Father    • COPD Father    • Stroke Brother    • Clotting disorder Brother    • Hypertension Brother    • Hyperlipidemia Brother    • Diabetes Maternal Aunt    • Diabetes Maternal Uncle    • Heart disease Maternal Grandmother    • Stroke Maternal Grandmother    • Diabetes Maternal Grandmother    • Malig Hyperthermia Neg Hx        Review of Systems  Constitutional: No wt loss, fever, fatigue  Gastrointestinal: No nausea, abdominal pain  Behavioral/Psych: No insomnia or anxiety   Cardiovascular no chest pains or tightness in the chest  Objective:       Physical Exam  /80   Pulse 94   Ht 177.8 cm (70\")   Wt 105 kg (232 lb)   BMI 33.29 kg/m²   General appearance: No acute changes   Neck: Trachea midline; NECK, supple, no thyromegaly or lymphadenopathy   Lungs: Normal size and shape, normal breath sounds, equal distribution of air, no rales and rhonchi   CV: S1-S2 regular, no murmurs, no rub, no gallop   Abdomen: Soft, non-tender; no masses , no abnormal abdominal sounds   Extremities: No deformity , normal color , no peripheral edema   Skin: Normal temperature, turgor and texture; no rash, ulcers          Procedures      Echocardiogram:        Current Outpatient Medications:   •  acetaminophen (TYLENOL) 500 MG tablet, Take 500 mg by mouth Every 6 (Six) Hours As Needed for Mild Pain ., Disp: , Rfl:   •  amLODIPine (NORVASC) 5 MG tablet, Take 5 mg by mouth Every Night., Disp: , Rfl:   •  atorvastatin (LIPITOR) 10 MG tablet, Take 10 mg by mouth Every Night., Disp: , Rfl:   •  cholestyramine light (PREVALITE) 4 GM/DOSE powder, Take  by mouth 2 (Two) Times a Day As Needed., Disp: , Rfl:   •  divalproex (DEPAKOTE) 500 MG DR tablet, " Take 500 mg by mouth Every Night., Disp: , Rfl:   •  famotidine (PEPCID) 20 MG tablet, Take 20 mg by mouth 2 (Two) Times a Day., Disp: , Rfl:   •  furosemide (LASIX) 20 MG tablet, Take 20 mg by mouth Daily., Disp: , Rfl:   •  metOLazone (ZAROXOLYN) 5 MG tablet, Take 5 mg by mouth. Every three days, Disp: , Rfl:   •  metoprolol succinate XL (TOPROL-XL) 50 MG 24 hr tablet, Take 50 mg by mouth Every Night., Disp: , Rfl:   •  Multiple Vitamins-Minerals (MULTI COMPLETE PO), Take 1 tablet by mouth Daily. HOLD FOR SURGERY, Disp: , Rfl:   •  Naproxen Sod-Diphenhydramine (ALEVE PM PO), Take 1 tablet by mouth As Needed. HOLD FOR SURGERY, Disp: , Rfl:   •  nitroglycerin (NITROSTAT) 0.4 MG SL tablet, Place  under the tongue., Disp: , Rfl:   •  PARoxetine (PAXIL) 20 MG tablet, Take 20 mg by mouth Every Night., Disp: , Rfl:   •  TURMERIC PO, Take 1 tablet by mouth Daily. HOLD FOR SURGERY, Disp: , Rfl:   •  saccharomyces boulardii (FLORASTOR) 250 MG capsule, Take 250 mg by mouth 2 (Two) Times a Day., Disp: , Rfl:    Assessment:        Patient Active Problem List   Diagnosis   • Abnormal finding on thallium stress test   • Chest pain   • Atherosclerosis of coronary artery   • Essential hypertension   • Breath shortness   • Unstable angina pectoris (CMS/HCC)   • Disorder of joint   • History of cardiac catheterization   • Abscess of rectum   • Lumbar radiculopathy   • Degeneration of intervertebral disc of lumbar region   • Infected sebaceous cyst   • Cerebral hemorrhage (CMS/HCC)   • Hyperlipidemia   • Chronic diastolic heart failure (CMS/HCC)   • Depression   • Sebaceous cyst of skin of breast   • Moderate COPD (chronic obstructive pulmonary disease) (CMS/HCC)   • Chronic obstructive pulmonary disease (CMS/HCC)   • Community acquired pneumonia   • Chronic low back pain   • Abscess   • Hyperlipemia   • Acute non-ST-elevation myocardial infarction (CMS/HCC)   • Dyslipidemia   • Leg swelling               Plan:            ICD-10-CM  ICD-9-CM   1. Coronary artery disease involving native coronary artery of native heart without angina pectoris I25.10 414.01   2. Hyperlipidemia, unspecified hyperlipidemia type E78.5 272.4   3. Atherosclerosis of native coronary artery of native heart without angina pectoris I25.10 414.01   4. Essential hypertension I10 401.9     1. Coronary artery disease involving native coronary artery of native heart without angina pectoris  No angina pectoris    2. Hyperlipidemia, unspecified hyperlipidemia type  Continue current treatment    3. Atherosclerosis of native coronary artery of native heart without angina pectoris  Stable    4. Essential hypertension  Blood pressure under control       Specificity and sensitivity of the stress test/ cardiac workup has been explained. Pt has been explained if  Symptoms continue please go to ER, and further w/p will be required.    Also explained this does not rule out coronary artery disease or the future events, continue to emphasize on risk reductions for coronary artery disease    Pt also advised to contact PCP for other causes of symptoms    Ok for knee surgery    See in 1 yr    COUNSELING:    Cierra Rameyeling was given to patient for the following topics: diagnostic results, risk factor reductions, impressions, risks and benefits of treatment options and importance of treatment compliance .       SMOKING COUNSELING:    [unfilled]    Dictated using Dragon dictation

## 2020-02-06 ENCOUNTER — HOSPITAL ENCOUNTER (INPATIENT)
Facility: HOSPITAL | Age: 80
LOS: 2 days | Discharge: HOME HEALTH | End: 2020-02-08
Attending: ORTHOPAEDIC SURGERY | Admitting: ORTHOPAEDIC SURGERY

## 2020-02-06 ENCOUNTER — ANESTHESIA EVENT (OUTPATIENT)
Dept: PERIOP | Facility: HOSPITAL | Age: 80
End: 2020-02-06

## 2020-02-06 ENCOUNTER — ANESTHESIA (OUTPATIENT)
Dept: PERIOP | Facility: HOSPITAL | Age: 80
End: 2020-02-06

## 2020-02-06 ENCOUNTER — APPOINTMENT (OUTPATIENT)
Dept: GENERAL RADIOLOGY | Facility: HOSPITAL | Age: 80
End: 2020-02-06

## 2020-02-06 DIAGNOSIS — M17.11 OSTEOARTHRITIS OF RIGHT KNEE, UNSPECIFIED OSTEOARTHRITIS TYPE: Primary | ICD-10-CM

## 2020-02-06 PROBLEM — M17.9 DJD (DEGENERATIVE JOINT DISEASE) OF KNEE: Status: ACTIVE | Noted: 2020-02-06

## 2020-02-06 PROCEDURE — 25010000002 FENTANYL CITRATE (PF) 100 MCG/2ML SOLUTION: Performed by: NURSE ANESTHETIST, CERTIFIED REGISTERED

## 2020-02-06 PROCEDURE — 25010000002 NEOSTIGMINE PER 0.5 MG: Performed by: STUDENT IN AN ORGANIZED HEALTH CARE EDUCATION/TRAINING PROGRAM

## 2020-02-06 PROCEDURE — 25010000002 KETOROLAC TROMETHAMINE PER 15 MG: Performed by: ORTHOPAEDIC SURGERY

## 2020-02-06 PROCEDURE — C9290 INJ, BUPIVACAINE LIPOSOME: HCPCS | Performed by: ORTHOPAEDIC SURGERY

## 2020-02-06 PROCEDURE — 25010000002 VANCOMYCIN 10 G RECONSTITUTED SOLUTION: Performed by: ORTHOPAEDIC SURGERY

## 2020-02-06 PROCEDURE — 25010000002 PROPOFOL 10 MG/ML EMULSION: Performed by: NURSE ANESTHETIST, CERTIFIED REGISTERED

## 2020-02-06 PROCEDURE — 25010000002 ONDANSETRON PER 1 MG: Performed by: STUDENT IN AN ORGANIZED HEALTH CARE EDUCATION/TRAINING PROGRAM

## 2020-02-06 PROCEDURE — C1713 ANCHOR/SCREW BN/BN,TIS/BN: HCPCS | Performed by: ORTHOPAEDIC SURGERY

## 2020-02-06 PROCEDURE — 0SRC069 REPLACEMENT OF RIGHT KNEE JOINT WITH OXIDIZED ZIRCONIUM ON POLYETHYLENE SYNTHETIC SUBSTITUTE, CEMENTED, OPEN APPROACH: ICD-10-PCS | Performed by: ORTHOPAEDIC SURGERY

## 2020-02-06 PROCEDURE — 73560 X-RAY EXAM OF KNEE 1 OR 2: CPT

## 2020-02-06 PROCEDURE — 25010000003 BUPIVACAINE LIPOSOME 1.3 % SUSPENSION 20 ML VIAL: Performed by: ORTHOPAEDIC SURGERY

## 2020-02-06 PROCEDURE — C1776 JOINT DEVICE (IMPLANTABLE): HCPCS | Performed by: ORTHOPAEDIC SURGERY

## 2020-02-06 DEVICE — JOURNEY II CR FEMORAL OXINIUM                                    NONPOROUS RIGHT SIZE 5
Type: IMPLANTABLE DEVICE | Site: KNEE | Status: FUNCTIONAL
Brand: JOURNEY

## 2020-02-06 DEVICE — JOURNEY 7.5 ROUND RESURF PAT 35MM STANDARD
Type: IMPLANTABLE DEVICE | Site: KNEE | Status: FUNCTIONAL
Brand: JOURNEY

## 2020-02-06 DEVICE — IMPLANTABLE DEVICE: Type: IMPLANTABLE DEVICE | Site: KNEE | Status: FUNCTIONAL

## 2020-02-06 DEVICE — JOURNEY TIBIAL BASEPLATE NONPOROUS                                    RIGHT SIZE 3
Type: IMPLANTABLE DEVICE | Site: KNEE | Status: FUNCTIONAL
Brand: JOURNEY

## 2020-02-06 DEVICE — JOURNEY II CR INSERT XLPE RIGHT SIZE 3-4 10MM
Type: IMPLANTABLE DEVICE | Site: KNEE | Status: FUNCTIONAL
Brand: JOURNEY

## 2020-02-06 DEVICE — CMT BONE PALACOS R HI/VISC 1X40: Type: IMPLANTABLE DEVICE | Site: KNEE | Status: FUNCTIONAL

## 2020-02-06 RX ORDER — PAROXETINE HYDROCHLORIDE 20 MG/1
20 TABLET, FILM COATED ORAL NIGHTLY
Status: DISCONTINUED | OUTPATIENT
Start: 2020-02-06 | End: 2020-02-08 | Stop reason: HOSPADM

## 2020-02-06 RX ORDER — MAGNESIUM HYDROXIDE 1200 MG/15ML
LIQUID ORAL AS NEEDED
Status: DISCONTINUED | OUTPATIENT
Start: 2020-02-06 | End: 2020-02-06 | Stop reason: HOSPADM

## 2020-02-06 RX ORDER — PROMETHAZINE HYDROCHLORIDE 25 MG/1
25 TABLET ORAL ONCE AS NEEDED
Status: DISCONTINUED | OUTPATIENT
Start: 2020-02-06 | End: 2020-02-06 | Stop reason: HOSPADM

## 2020-02-06 RX ORDER — HYDROCODONE BITARTRATE AND ACETAMINOPHEN 7.5; 325 MG/1; MG/1
1 TABLET ORAL ONCE AS NEEDED
Status: DISCONTINUED | OUTPATIENT
Start: 2020-02-06 | End: 2020-02-06 | Stop reason: HOSPADM

## 2020-02-06 RX ORDER — DIPHENHYDRAMINE HYDROCHLORIDE 50 MG/ML
12.5 INJECTION INTRAMUSCULAR; INTRAVENOUS
Status: DISCONTINUED | OUTPATIENT
Start: 2020-02-06 | End: 2020-02-06 | Stop reason: HOSPADM

## 2020-02-06 RX ORDER — FAMOTIDINE 20 MG/1
20 TABLET, FILM COATED ORAL
Status: DISCONTINUED | OUTPATIENT
Start: 2020-02-06 | End: 2020-02-08 | Stop reason: HOSPADM

## 2020-02-06 RX ORDER — EPHEDRINE SULFATE 50 MG/ML
INJECTION, SOLUTION INTRAVENOUS AS NEEDED
Status: DISCONTINUED | OUTPATIENT
Start: 2020-02-06 | End: 2020-02-06 | Stop reason: SURG

## 2020-02-06 RX ORDER — DIVALPROEX SODIUM 500 MG/1
500 TABLET, DELAYED RELEASE ORAL NIGHTLY
Status: DISCONTINUED | OUTPATIENT
Start: 2020-02-06 | End: 2020-02-08 | Stop reason: HOSPADM

## 2020-02-06 RX ORDER — ACETAMINOPHEN 325 MG/1
325 TABLET ORAL EVERY 4 HOURS PRN
Status: DISCONTINUED | OUTPATIENT
Start: 2020-02-06 | End: 2020-02-08 | Stop reason: HOSPADM

## 2020-02-06 RX ORDER — OXYCODONE AND ACETAMINOPHEN 7.5; 325 MG/1; MG/1
1 TABLET ORAL ONCE AS NEEDED
Status: COMPLETED | OUTPATIENT
Start: 2020-02-06 | End: 2020-02-06

## 2020-02-06 RX ORDER — BISACODYL 10 MG
10 SUPPOSITORY, RECTAL RECTAL DAILY PRN
Status: DISCONTINUED | OUTPATIENT
Start: 2020-02-06 | End: 2020-02-08 | Stop reason: HOSPADM

## 2020-02-06 RX ORDER — ACETAMINOPHEN 500 MG
1000 TABLET ORAL ONCE
Status: COMPLETED | OUTPATIENT
Start: 2020-02-06 | End: 2020-02-06

## 2020-02-06 RX ORDER — MIDAZOLAM HYDROCHLORIDE 1 MG/ML
0.5 INJECTION INTRAMUSCULAR; INTRAVENOUS
Status: DISCONTINUED | OUTPATIENT
Start: 2020-02-06 | End: 2020-02-06 | Stop reason: HOSPADM

## 2020-02-06 RX ORDER — PROPOFOL 10 MG/ML
VIAL (ML) INTRAVENOUS AS NEEDED
Status: DISCONTINUED | OUTPATIENT
Start: 2020-02-06 | End: 2020-02-06 | Stop reason: SURG

## 2020-02-06 RX ORDER — HYDROMORPHONE HYDROCHLORIDE 1 MG/ML
0.5 INJECTION, SOLUTION INTRAMUSCULAR; INTRAVENOUS; SUBCUTANEOUS
Status: DISCONTINUED | OUTPATIENT
Start: 2020-02-06 | End: 2020-02-06 | Stop reason: HOSPADM

## 2020-02-06 RX ORDER — NALOXONE HCL 0.4 MG/ML
0.1 VIAL (ML) INJECTION
Status: DISCONTINUED | OUTPATIENT
Start: 2020-02-06 | End: 2020-02-08 | Stop reason: HOSPADM

## 2020-02-06 RX ORDER — PROMETHAZINE HYDROCHLORIDE 25 MG/ML
12.5 INJECTION, SOLUTION INTRAMUSCULAR; INTRAVENOUS ONCE AS NEEDED
Status: DISCONTINUED | OUTPATIENT
Start: 2020-02-06 | End: 2020-02-06 | Stop reason: HOSPADM

## 2020-02-06 RX ORDER — BISACODYL 5 MG/1
10 TABLET, DELAYED RELEASE ORAL DAILY PRN
Status: DISCONTINUED | OUTPATIENT
Start: 2020-02-06 | End: 2020-02-08 | Stop reason: HOSPADM

## 2020-02-06 RX ORDER — DIPHENHYDRAMINE HCL 25 MG
25 CAPSULE ORAL
Status: DISCONTINUED | OUTPATIENT
Start: 2020-02-06 | End: 2020-02-06 | Stop reason: HOSPADM

## 2020-02-06 RX ORDER — METOPROLOL SUCCINATE 50 MG/1
50 TABLET, EXTENDED RELEASE ORAL ONCE
Status: COMPLETED | OUTPATIENT
Start: 2020-02-06 | End: 2020-02-06

## 2020-02-06 RX ORDER — ONDANSETRON 4 MG/1
4 TABLET, FILM COATED ORAL EVERY 6 HOURS PRN
Status: DISCONTINUED | OUTPATIENT
Start: 2020-02-06 | End: 2020-02-08 | Stop reason: HOSPADM

## 2020-02-06 RX ORDER — FERROUS SULFATE 325(65) MG
325 TABLET ORAL
Status: DISCONTINUED | OUTPATIENT
Start: 2020-02-07 | End: 2020-02-08 | Stop reason: HOSPADM

## 2020-02-06 RX ORDER — HYDRALAZINE HYDROCHLORIDE 20 MG/ML
5 INJECTION INTRAMUSCULAR; INTRAVENOUS
Status: DISCONTINUED | OUTPATIENT
Start: 2020-02-06 | End: 2020-02-06 | Stop reason: HOSPADM

## 2020-02-06 RX ORDER — FLUMAZENIL 0.1 MG/ML
0.2 INJECTION INTRAVENOUS AS NEEDED
Status: DISCONTINUED | OUTPATIENT
Start: 2020-02-06 | End: 2020-02-06 | Stop reason: HOSPADM

## 2020-02-06 RX ORDER — OXYCODONE HYDROCHLORIDE AND ACETAMINOPHEN 5; 325 MG/1; MG/1
1 TABLET ORAL EVERY 4 HOURS PRN
Status: DISCONTINUED | OUTPATIENT
Start: 2020-02-06 | End: 2020-02-08 | Stop reason: HOSPADM

## 2020-02-06 RX ORDER — ACETAMINOPHEN 325 MG/1
650 TABLET ORAL EVERY 4 HOURS PRN
Status: DISCONTINUED | OUTPATIENT
Start: 2020-02-06 | End: 2020-02-08 | Stop reason: HOSPADM

## 2020-02-06 RX ORDER — SODIUM CHLORIDE 0.9 % (FLUSH) 0.9 %
1-10 SYRINGE (ML) INJECTION AS NEEDED
Status: DISCONTINUED | OUTPATIENT
Start: 2020-02-06 | End: 2020-02-08 | Stop reason: HOSPADM

## 2020-02-06 RX ORDER — LABETALOL HYDROCHLORIDE 5 MG/ML
5 INJECTION, SOLUTION INTRAVENOUS
Status: DISCONTINUED | OUTPATIENT
Start: 2020-02-06 | End: 2020-02-06 | Stop reason: HOSPADM

## 2020-02-06 RX ORDER — SODIUM CHLORIDE, SODIUM LACTATE, POTASSIUM CHLORIDE, CALCIUM CHLORIDE 600; 310; 30; 20 MG/100ML; MG/100ML; MG/100ML; MG/100ML
100 INJECTION, SOLUTION INTRAVENOUS CONTINUOUS
Status: DISCONTINUED | OUTPATIENT
Start: 2020-02-06 | End: 2020-02-08 | Stop reason: HOSPADM

## 2020-02-06 RX ORDER — ONDANSETRON 2 MG/ML
4 INJECTION INTRAMUSCULAR; INTRAVENOUS ONCE AS NEEDED
Status: DISCONTINUED | OUTPATIENT
Start: 2020-02-06 | End: 2020-02-06 | Stop reason: HOSPADM

## 2020-02-06 RX ORDER — METOPROLOL SUCCINATE 50 MG/1
50 TABLET, EXTENDED RELEASE ORAL NIGHTLY
Status: DISCONTINUED | OUTPATIENT
Start: 2020-02-06 | End: 2020-02-08 | Stop reason: HOSPADM

## 2020-02-06 RX ORDER — SENNA AND DOCUSATE SODIUM 50; 8.6 MG/1; MG/1
2 TABLET, FILM COATED ORAL ONCE
Status: COMPLETED | OUTPATIENT
Start: 2020-02-06 | End: 2020-02-06

## 2020-02-06 RX ORDER — GLYCOPYRROLATE 0.2 MG/ML
INJECTION INTRAMUSCULAR; INTRAVENOUS AS NEEDED
Status: DISCONTINUED | OUTPATIENT
Start: 2020-02-06 | End: 2020-02-06 | Stop reason: SURG

## 2020-02-06 RX ORDER — ESMOLOL HYDROCHLORIDE 10 MG/ML
INJECTION INTRAVENOUS AS NEEDED
Status: DISCONTINUED | OUTPATIENT
Start: 2020-02-06 | End: 2020-02-06 | Stop reason: SURG

## 2020-02-06 RX ORDER — OXYCODONE HYDROCHLORIDE AND ACETAMINOPHEN 5; 325 MG/1; MG/1
2 TABLET ORAL EVERY 4 HOURS PRN
Status: DISCONTINUED | OUTPATIENT
Start: 2020-02-06 | End: 2020-02-08 | Stop reason: HOSPADM

## 2020-02-06 RX ORDER — SODIUM CHLORIDE, SODIUM LACTATE, POTASSIUM CHLORIDE, CALCIUM CHLORIDE 600; 310; 30; 20 MG/100ML; MG/100ML; MG/100ML; MG/100ML
9 INJECTION, SOLUTION INTRAVENOUS CONTINUOUS
Status: DISCONTINUED | OUTPATIENT
Start: 2020-02-06 | End: 2020-02-08 | Stop reason: HOSPADM

## 2020-02-06 RX ORDER — NITROGLYCERIN 0.4 MG/1
0.4 TABLET SUBLINGUAL
Status: DISCONTINUED | OUTPATIENT
Start: 2020-02-06 | End: 2020-02-08 | Stop reason: HOSPADM

## 2020-02-06 RX ORDER — ONDANSETRON 2 MG/ML
4 INJECTION INTRAMUSCULAR; INTRAVENOUS EVERY 6 HOURS PRN
Status: DISCONTINUED | OUTPATIENT
Start: 2020-02-06 | End: 2020-02-08 | Stop reason: HOSPADM

## 2020-02-06 RX ORDER — LIDOCAINE HYDROCHLORIDE 20 MG/ML
INJECTION, SOLUTION INFILTRATION; PERINEURAL AS NEEDED
Status: DISCONTINUED | OUTPATIENT
Start: 2020-02-06 | End: 2020-02-06 | Stop reason: SURG

## 2020-02-06 RX ORDER — SODIUM CHLORIDE 0.9 % (FLUSH) 0.9 %
3 SYRINGE (ML) INJECTION EVERY 12 HOURS SCHEDULED
Status: DISCONTINUED | OUTPATIENT
Start: 2020-02-06 | End: 2020-02-08 | Stop reason: HOSPADM

## 2020-02-06 RX ORDER — NALOXONE HCL 0.4 MG/ML
0.2 VIAL (ML) INJECTION AS NEEDED
Status: DISCONTINUED | OUTPATIENT
Start: 2020-02-06 | End: 2020-02-06 | Stop reason: HOSPADM

## 2020-02-06 RX ORDER — PROMETHAZINE HYDROCHLORIDE 25 MG/ML
6.25 INJECTION, SOLUTION INTRAMUSCULAR; INTRAVENOUS
Status: DISCONTINUED | OUTPATIENT
Start: 2020-02-06 | End: 2020-02-06 | Stop reason: HOSPADM

## 2020-02-06 RX ORDER — SODIUM CHLORIDE 0.9 % (FLUSH) 0.9 %
3 SYRINGE (ML) INJECTION EVERY 12 HOURS SCHEDULED
Status: DISCONTINUED | OUTPATIENT
Start: 2020-02-06 | End: 2020-02-06 | Stop reason: HOSPADM

## 2020-02-06 RX ORDER — ACETAMINOPHEN 650 MG/1
650 SUPPOSITORY RECTAL EVERY 4 HOURS PRN
Status: DISCONTINUED | OUTPATIENT
Start: 2020-02-06 | End: 2020-02-08 | Stop reason: HOSPADM

## 2020-02-06 RX ORDER — KETOROLAC TROMETHAMINE 30 MG/ML
15 INJECTION, SOLUTION INTRAMUSCULAR; INTRAVENOUS EVERY 6 HOURS
Status: COMPLETED | OUTPATIENT
Start: 2020-02-06 | End: 2020-02-07

## 2020-02-06 RX ORDER — ATORVASTATIN CALCIUM 10 MG/1
10 TABLET, FILM COATED ORAL NIGHTLY
Status: DISCONTINUED | OUTPATIENT
Start: 2020-02-06 | End: 2020-02-08 | Stop reason: HOSPADM

## 2020-02-06 RX ORDER — ONDANSETRON 2 MG/ML
INJECTION INTRAMUSCULAR; INTRAVENOUS AS NEEDED
Status: DISCONTINUED | OUTPATIENT
Start: 2020-02-06 | End: 2020-02-06 | Stop reason: SURG

## 2020-02-06 RX ORDER — EPHEDRINE SULFATE 50 MG/ML
5 INJECTION, SOLUTION INTRAVENOUS ONCE AS NEEDED
Status: DISCONTINUED | OUTPATIENT
Start: 2020-02-06 | End: 2020-02-06 | Stop reason: HOSPADM

## 2020-02-06 RX ORDER — ACETAMINOPHEN 325 MG/1
650 TABLET ORAL ONCE AS NEEDED
Status: DISCONTINUED | OUTPATIENT
Start: 2020-02-06 | End: 2020-02-06 | Stop reason: HOSPADM

## 2020-02-06 RX ORDER — ROCURONIUM BROMIDE 10 MG/ML
INJECTION, SOLUTION INTRAVENOUS AS NEEDED
Status: DISCONTINUED | OUTPATIENT
Start: 2020-02-06 | End: 2020-02-06 | Stop reason: SURG

## 2020-02-06 RX ORDER — FENTANYL CITRATE 50 UG/ML
INJECTION, SOLUTION INTRAMUSCULAR; INTRAVENOUS AS NEEDED
Status: DISCONTINUED | OUTPATIENT
Start: 2020-02-06 | End: 2020-02-06 | Stop reason: SURG

## 2020-02-06 RX ORDER — ASPIRIN 325 MG
325 TABLET, DELAYED RELEASE (ENTERIC COATED) ORAL DAILY
Status: DISCONTINUED | OUTPATIENT
Start: 2020-02-06 | End: 2020-02-07

## 2020-02-06 RX ORDER — FENTANYL CITRATE 50 UG/ML
50 INJECTION, SOLUTION INTRAMUSCULAR; INTRAVENOUS
Status: DISCONTINUED | OUTPATIENT
Start: 2020-02-06 | End: 2020-02-06 | Stop reason: HOSPADM

## 2020-02-06 RX ORDER — MIDAZOLAM HYDROCHLORIDE 1 MG/ML
1 INJECTION INTRAMUSCULAR; INTRAVENOUS
Status: DISCONTINUED | OUTPATIENT
Start: 2020-02-06 | End: 2020-02-06 | Stop reason: HOSPADM

## 2020-02-06 RX ORDER — AMLODIPINE BESYLATE 5 MG/1
5 TABLET ORAL NIGHTLY
Status: DISCONTINUED | OUTPATIENT
Start: 2020-02-06 | End: 2020-02-08 | Stop reason: HOSPADM

## 2020-02-06 RX ORDER — SODIUM CHLORIDE 0.9 % (FLUSH) 0.9 %
3-10 SYRINGE (ML) INJECTION AS NEEDED
Status: DISCONTINUED | OUTPATIENT
Start: 2020-02-06 | End: 2020-02-06 | Stop reason: HOSPADM

## 2020-02-06 RX ORDER — PROMETHAZINE HYDROCHLORIDE 25 MG/1
25 SUPPOSITORY RECTAL ONCE AS NEEDED
Status: DISCONTINUED | OUTPATIENT
Start: 2020-02-06 | End: 2020-02-06 | Stop reason: HOSPADM

## 2020-02-06 RX ADMIN — OXYCODONE HYDROCHLORIDE AND ACETAMINOPHEN 1 TABLET: 7.5; 325 TABLET ORAL at 16:47

## 2020-02-06 RX ADMIN — LIDOCAINE HYDROCHLORIDE 100 MG: 20 INJECTION, SOLUTION INFILTRATION; PERINEURAL at 14:04

## 2020-02-06 RX ADMIN — METOPROLOL SUCCINATE 50 MG: 50 TABLET, EXTENDED RELEASE ORAL at 20:54

## 2020-02-06 RX ADMIN — FENTANYL CITRATE 50 MCG: 50 INJECTION INTRAMUSCULAR; INTRAVENOUS at 14:11

## 2020-02-06 RX ADMIN — METOPROLOL SUCCINATE 50 MG: 50 TABLET, EXTENDED RELEASE ORAL at 11:10

## 2020-02-06 RX ADMIN — FENTANYL CITRATE 50 MCG: 50 INJECTION INTRAMUSCULAR; INTRAVENOUS at 14:41

## 2020-02-06 RX ADMIN — EPHEDRINE SULFATE 10 MG: 50 INJECTION INTRAVENOUS at 14:51

## 2020-02-06 RX ADMIN — OXYCODONE AND ACETAMINOPHEN 2 TABLET: 5; 325 TABLET ORAL at 20:55

## 2020-02-06 RX ADMIN — FENTANYL CITRATE 50 MCG: 50 INJECTION, SOLUTION INTRAMUSCULAR; INTRAVENOUS at 16:55

## 2020-02-06 RX ADMIN — FENTANYL CITRATE 50 MCG: 50 INJECTION, SOLUTION INTRAMUSCULAR; INTRAVENOUS at 15:57

## 2020-02-06 RX ADMIN — ATORVASTATIN CALCIUM 10 MG: 10 TABLET, FILM COATED ORAL at 20:54

## 2020-02-06 RX ADMIN — SODIUM CHLORIDE, PRESERVATIVE FREE 3 ML: 5 INJECTION INTRAVENOUS at 20:55

## 2020-02-06 RX ADMIN — ASPIRIN 325 MG: 325 TABLET, COATED ORAL at 20:54

## 2020-02-06 RX ADMIN — PROPOFOL 140 MG: 10 INJECTION, EMULSION INTRAVENOUS at 14:04

## 2020-02-06 RX ADMIN — SODIUM CHLORIDE, POTASSIUM CHLORIDE, SODIUM LACTATE AND CALCIUM CHLORIDE 9 ML/HR: 600; 310; 30; 20 INJECTION, SOLUTION INTRAVENOUS at 11:03

## 2020-02-06 RX ADMIN — ONDANSETRON HYDROCHLORIDE 4 MG: 2 SOLUTION INTRAMUSCULAR; INTRAVENOUS at 15:20

## 2020-02-06 RX ADMIN — KETOROLAC TROMETHAMINE 15 MG: 30 INJECTION, SOLUTION INTRAMUSCULAR at 22:42

## 2020-02-06 RX ADMIN — SODIUM CHLORIDE, POTASSIUM CHLORIDE, SODIUM LACTATE AND CALCIUM CHLORIDE 100 ML/HR: 600; 310; 30; 20 INJECTION, SOLUTION INTRAVENOUS at 17:00

## 2020-02-06 RX ADMIN — SENNOSIDES AND DOCUSATE SODIUM 2 TABLET: 8.6; 5 TABLET ORAL at 20:54

## 2020-02-06 RX ADMIN — FENTANYL CITRATE 50 MCG: 50 INJECTION INTRAMUSCULAR; INTRAVENOUS at 14:34

## 2020-02-06 RX ADMIN — KETOROLAC TROMETHAMINE 15 MG: 30 INJECTION, SOLUTION INTRAMUSCULAR at 16:03

## 2020-02-06 RX ADMIN — VANCOMYCIN HYDROCHLORIDE 1500 MG: 10 INJECTION, POWDER, LYOPHILIZED, FOR SOLUTION INTRAVENOUS at 11:10

## 2020-02-06 RX ADMIN — DIVALPROEX SODIUM 500 MG: 500 TABLET, DELAYED RELEASE ORAL at 20:54

## 2020-02-06 RX ADMIN — AMLODIPINE BESYLATE 5 MG: 5 TABLET ORAL at 20:54

## 2020-02-06 RX ADMIN — NEOSTIGMINE METHYLSULFATE 5 MG: 1 INJECTION INTRAMUSCULAR; INTRAVENOUS; SUBCUTANEOUS at 15:21

## 2020-02-06 RX ADMIN — FENTANYL CITRATE 50 MCG: 50 INJECTION INTRAMUSCULAR; INTRAVENOUS at 14:04

## 2020-02-06 RX ADMIN — ROCURONIUM BROMIDE 10 MG: 10 INJECTION, SOLUTION INTRAVENOUS at 14:34

## 2020-02-06 RX ADMIN — ESMOLOL HYDROCHLORIDE 20 MG: 10 INJECTION, SOLUTION INTRAVENOUS at 14:38

## 2020-02-06 RX ADMIN — LIDOCAINE HYDROCHLORIDE 30 MG: 20 INJECTION, SOLUTION INFILTRATION; PERINEURAL at 14:05

## 2020-02-06 RX ADMIN — PAROXETINE 20 MG: 20 TABLET, FILM COATED ORAL at 20:55

## 2020-02-06 RX ADMIN — GLYCOPYRROLATE 0.7 MG: 0.2 INJECTION INTRAMUSCULAR; INTRAVENOUS at 15:21

## 2020-02-06 RX ADMIN — ACETAMINOPHEN 1000 MG: 500 TABLET, FILM COATED ORAL at 11:10

## 2020-02-06 RX ADMIN — FAMOTIDINE 20 MG: 20 TABLET, FILM COATED ORAL at 20:54

## 2020-02-06 RX ADMIN — VANCOMYCIN HYDROCHLORIDE 1500 MG: 10 INJECTION, POWDER, LYOPHILIZED, FOR SOLUTION INTRAVENOUS at 22:24

## 2020-02-06 NOTE — ANESTHESIA POSTPROCEDURE EVALUATION
Patient: Cierra Kc    Procedure Summary     Date:  02/06/20 Room / Location:  [facility] OR [address] [place] [address] MAIN OR    Anesthesia Start:  1357 Anesthesia Stop:  1547    Procedure:  TOTAL KNEE ARTHROPLASTY (Right Knee) Diagnosis:      Surgeon:  Lobo Sorto MD Provider:  Braulio Mario MD    Anesthesia Type:  general ASA Status:  3          Anesthesia Type: general    Vitals  Vitals Value Taken Time   /86 2/6/2020  3:44 PM   Temp     Pulse 85 2/6/2020  3:47 PM   Resp     SpO2 89 % 2/6/2020  3:47 PM   Vitals shown include unvalidated device data.        Post Anesthesia Care and Evaluation    Patient location during evaluation: bedside  Patient participation: complete - patient participated  Level of consciousness: awake and alert  Pain management: adequate  Airway patency: patent  Anesthetic complications: No anesthetic complications  PONV Status: controlled  Cardiovascular status: blood pressure returned to baseline and acceptable  Respiratory status: acceptable  Hydration status: acceptable

## 2020-02-06 NOTE — ANESTHESIA PREPROCEDURE EVALUATION
Anesthesia Evaluation     no history of anesthetic complications:               Airway   Mallampati: III  no difficulty expected  Dental - normal exam   (+) upper dentures and lower dentures    Pulmonary - normal exam   (+) pneumonia (2yrs ago) , a smoker Former, COPD, shortness of breath,   (-) asthma, sleep apnea    PE comment: nonlabored  Cardiovascular - normal exam    Rhythm: regular  Rate: normal    (+) hypertension well controlled 2 medications or greater, past MI , CAD, angina (h/o), CHF Diastolic >=55%, hyperlipidemia,   (-) valvular problems/murmurs, dysrhythmias    ROS comment: Stress Test 1/3/20: normal      Neuro/Psych  (+) seizures (none for ~10yrs; partial when she had them) well controlled, CVA (no residual deficits), psychiatric history Depression,     (-) TIA  GI/Hepatic/Renal/Endo    (+) obesity,  GERD,    (-) liver disease, no renal disease, diabetes, no thyroid disorder    Musculoskeletal     (+) arthralgias, back pain,   Abdominal    Substance History      OB/GYN          Other   arthritis,                      Anesthesia Plan    ASA 3     general     intravenous induction     Anesthetic plan, all risks, benefits, and alternatives have been provided, discussed and informed consent has been obtained with: patient.

## 2020-02-06 NOTE — ANESTHESIA PROCEDURE NOTES
Airway  Urgency: elective    Date/Time: 2/6/2020 2:07 PM  Airway not difficult    General Information and Staff    Patient location during procedure: OR  Anesthesiologist: Braulio Mario MD  CRNA: Ju West CRNA    Indications and Patient Condition  Indications for airway management: airway protection    Preoxygenated: yes  MILS maintained throughout  Mask difficulty assessment: 1 - vent by mask    Final Airway Details  Final airway type: endotracheal airway      Successful airway: ETT  Cuffed: yes   Successful intubation technique: direct laryngoscopy  Facilitating devices/methods: intubating stylet  Endotracheal tube insertion site: oral  Blade: Bassett  Blade size: 2  ETT size (mm): 7.0  Cormack-Lehane Classification: grade I - full view of glottis  Placement verified by: chest auscultation and capnometry   Measured from: lips  ETT/EBT  to lips (cm): 22  Number of attempts at approach: 1  Assessment: lips, teeth, and gum same as pre-op and atraumatic intubation

## 2020-02-06 NOTE — PLAN OF CARE
Problem: Patient Care Overview  Goal: Plan of Care Review  Outcome: Ongoing (interventions implemented as appropriate)  Flowsheets (Taken 2/6/2020 3031)  Plan of Care Reviewed With: patient  Outcome Summary: pain control improved but still complaining of pain, VSS, still needs to ambulate, NVI, dressing c/d/i, HV intact, plan to dc home when ready, educated on bp meds and monitoring

## 2020-02-06 NOTE — ANESTHESIA POSTPROCEDURE EVALUATION
"Patient: Cierra Kc    Procedure Summary     Date:  02/06/20 Room / Location:  [facility] OR [address] [place] [address] MAIN OR    Anesthesia Start:  1357 Anesthesia Stop:  1547    Procedure:  TOTAL KNEE ARTHROPLASTY (Right Knee) Diagnosis:      Surgeon:  Lobo Sorto MD Provider:  Brauilo Mario MD    Anesthesia Type:  general ASA Status:  3          Anesthesia Type: general    Vitals  Vitals Value Taken Time   /86 2/6/2020  5:05 PM   Temp 36.9 °C (98.4 °F) 2/6/2020  3:43 PM   Pulse 66 2/6/2020  5:21 PM   Resp 16 2/6/2020  4:35 PM   SpO2 100 % 2/6/2020  5:21 PM   Vitals shown include unvalidated device data.        Post Anesthesia Care and Evaluation    Patient location during evaluation: bedside  Patient participation: complete - patient participated  Level of consciousness: awake and alert  Pain management: adequate  Airway patency: patent  Anesthetic complications: No anesthetic complications    Cardiovascular status: acceptable  Respiratory status: acceptable  Hydration status: acceptable    Comments: /70   Pulse 65   Temp 36.9 °C (98.4 °F) (Oral)   Resp 16   Ht 175.3 cm (69\")   Wt 106 kg (233 lb 9 oz)   SpO2 99%   BMI 34.49 kg/m²           "

## 2020-02-06 NOTE — PERIOPERATIVE NURSING NOTE
PT HELD TOPROL SINCE Sunday. PT STATED SHE WAS TOLD TO BUT STATED SHE COULD HAVE MADE A MISTAKE. DR MITCHELL INFORMED. PT HR 92 AND /81. ORDER RECEIVED TO GIVE PT HER HOME DOSE OF TOPROL IN PREOP.

## 2020-02-06 NOTE — OP NOTE
Orthopaedic Surgery   Right Total Knee Replacement  Dr. Lobo Sorto   [phone]  Patient Name:  Cierar Kc  YOB: 1940  Medical Records Number:  [id number]    Date of Procedure:  2/6/2020    Pre-operative Diagnosis:  DJD Right Knee    Post-operative Diagnosis:  DJD Right Knee    Procedure Performed:  Right Total Knee Replacement     Anesthesia: General, supplemented by a periarticular Exparel/bupivacaine injection.      Surgeon: Lobo Sorto MD     Assistant: Marion Long PA-C; note that as part of the surgical procedure, I utilized service of an assistant surgeon, specifically Marion Long PA-C. Assistant surgeon participated in crucial portion of the operation. Use of the assistant surgeon greatly reduced overall operative time, thus significantly reducing overall morbidity for the patient.      Implants:    Implant Name Type Inv. Item Serial No.  Lot No. LRB No. Used Action   CMT BONE PALACOS R HI/VISC 1X40 - [id number] Implant CMT BONE PALACOS R HI/VISC 1X40  [facility] [id number] Right 1 Implanted   COMP FEM JOURNEY2 OXINIUM CR RT SZ5 - [id number] Implant COMP FEM JOURNEY2 OXINIUM CR RT SZ5  [employer] [id number] Right 1 Implanted   BASEPLT TIB JOURNEY NONPOR SZ3 RT - [id number] Implant BASEPLT TIB JOURNEY NONPOR SZ3 RT  [employer] [id number] Right 1 Implanted   PATELLA RESRF JOURNEY 7.5X35MM RND - [id number] Implant PATELLA RESRF JOURNEY 7.5X35MM RND  [employer] [id number] Right 1 Implanted   INSRT ART/KN JOURNEY2 CR SZ3TO4 10MM RT - [id number] Implant INSRT ART/KN JOURNEY2 CR SZ3TO4 10MM RT  [employer] [id number] Right 1 Implanted       Implants utilized: I used the [employer] journey 2 system.  I used a size 3 tibial baseplate.  Size 5  femur.  10 CR mm  polyethylene insert.  35 patella.    Tourniquet Time: Was 45 minutes.      Medications: Note that we gave 1 g Topical tranexamic acid when the cement was mixed.      Estimated  Blood Loss:   25 mL    Specimens:   Order Name Source Comment Collection Info Order Time   PREGNANCY, URINE  POCT. PRN for all menstruating females.  2/6/2020 10:54 AM        Complications: None.      Quality Measures: I have documented the patient's current medications including dosage, frequency, and route of administration in medical record today. Conservative alternatives were discussed with the patient as part of the decision-making process prior to the procedure. The patient was evaluated immediately preoperatively for cardiovascular and thromboembolic risk factors.  She has a history of a TIA/stroke.  She has a remote history of DVT.  Prophylactic IV antibiotics were administered before the tourniquet went up.      Description of Procedure: After prep and drape, Right  knee was approached via midline longitudinal anterior incision. Medial parapatellar arthrotomy was extended to the vastus medialis obliquus which was split in line with the fibers near the medial border, in keeping with minimally invasive technique. Patella was osteotomized proper depth for the patella implant. [place] holes are created. Patella was subluxed and protected. Intramedullary instrumentation was used on each side of the joint; careful and thorough canal content irrigation. I cut the femur 10 mm depth, 5° valgus controlling rotation. The femur  was sized appropriatelyt. Anterior, posterior, and chamfer cuts were made. Tibia is cut 10 mm depth, 5° of posterior slope. Meniscectomies are completed. Trial reduction is performed. Rotation is marked and keel slot was created.      Bony surface was thoroughly cleaned and dried. I injected the posterior capsule and medial lateral gutter with Exparel solution containing 20 mL Exparel, 25 mL 0.25% bupivacaine with epinephrine, 20 mL saline. Care was taken to protect popliteal neurovascular structures and the peroneal nerve. I cemented the tibial baseplate,  Femur, and patella.  Trial reduction  revealed a 10 mm CR polyethylene insert best balanced stability and range of motion, and is locked in the tibial tray.      Tourniquet was released; hemostasis obtained. Wound was closed in layers with #1 Vicryl on the capsule, 2-0 Vicryl in subcutaneous tissue, and zipline in the skin over a 1/8-inch drain. Note that I injected my capsule with my Exparel solution during closure.      Lobo Sorto MD  2/6/2020  3:36 PM

## 2020-02-07 LAB
ANION GAP SERPL CALCULATED.3IONS-SCNC: 14.7 MMOL/L (ref 5–15)
BUN BLD-MCNC: 7 MG/DL (ref 8–23)
BUN/CREAT SERPL: 10.6 (ref 7–25)
CALCIUM SPEC-SCNC: 8.4 MG/DL (ref 8.6–10.5)
CHLORIDE SERPL-SCNC: 103 MMOL/L (ref 98–107)
CO2 SERPL-SCNC: 24.3 MMOL/L (ref 22–29)
CREAT BLD-MCNC: 0.66 MG/DL (ref 0.57–1)
GFR SERPL CREATININE-BSD FRML MDRD: 86 ML/MIN/1.73
GLUCOSE BLD-MCNC: 114 MG/DL (ref 65–99)
HCT VFR BLD AUTO: 31.6 % (ref 34–46.6)
HGB BLD-MCNC: 10.2 G/DL (ref 12–15.9)
POTASSIUM BLD-SCNC: 4.1 MMOL/L (ref 3.5–5.2)
SODIUM BLD-SCNC: 142 MMOL/L (ref 136–145)

## 2020-02-07 PROCEDURE — 80048 BASIC METABOLIC PNL TOTAL CA: CPT | Performed by: ORTHOPAEDIC SURGERY

## 2020-02-07 PROCEDURE — 97110 THERAPEUTIC EXERCISES: CPT

## 2020-02-07 PROCEDURE — 25010000002 KETOROLAC TROMETHAMINE PER 15 MG: Performed by: ORTHOPAEDIC SURGERY

## 2020-02-07 PROCEDURE — 85014 HEMATOCRIT: CPT | Performed by: ORTHOPAEDIC SURGERY

## 2020-02-07 PROCEDURE — 85018 HEMOGLOBIN: CPT | Performed by: ORTHOPAEDIC SURGERY

## 2020-02-07 PROCEDURE — 97161 PT EVAL LOW COMPLEX 20 MIN: CPT

## 2020-02-07 PROCEDURE — 97150 GROUP THERAPEUTIC PROCEDURES: CPT

## 2020-02-07 RX ADMIN — OXYCODONE AND ACETAMINOPHEN 2 TABLET: 5; 325 TABLET ORAL at 06:30

## 2020-02-07 RX ADMIN — OXYCODONE AND ACETAMINOPHEN 1 TABLET: 5; 325 TABLET ORAL at 11:16

## 2020-02-07 RX ADMIN — FERROUS SULFATE TAB 325 MG (65 MG ELEMENTAL FE) 325 MG: 325 (65 FE) TAB at 09:06

## 2020-02-07 RX ADMIN — FAMOTIDINE 20 MG: 20 TABLET, FILM COATED ORAL at 18:11

## 2020-02-07 RX ADMIN — RIVAROXABAN 10 MG: 10 TABLET, FILM COATED ORAL at 11:16

## 2020-02-07 RX ADMIN — ATORVASTATIN CALCIUM 10 MG: 10 TABLET, FILM COATED ORAL at 20:24

## 2020-02-07 RX ADMIN — FAMOTIDINE 20 MG: 20 TABLET, FILM COATED ORAL at 06:55

## 2020-02-07 RX ADMIN — SODIUM CHLORIDE, PRESERVATIVE FREE 3 ML: 5 INJECTION INTRAVENOUS at 22:43

## 2020-02-07 RX ADMIN — DIVALPROEX SODIUM 500 MG: 500 TABLET, DELAYED RELEASE ORAL at 20:23

## 2020-02-07 RX ADMIN — KETOROLAC TROMETHAMINE 15 MG: 30 INJECTION, SOLUTION INTRAMUSCULAR at 09:05

## 2020-02-07 RX ADMIN — SODIUM CHLORIDE, PRESERVATIVE FREE 3 ML: 5 INJECTION INTRAVENOUS at 09:06

## 2020-02-07 RX ADMIN — KETOROLAC TROMETHAMINE 15 MG: 30 INJECTION, SOLUTION INTRAMUSCULAR at 05:39

## 2020-02-07 RX ADMIN — METOPROLOL SUCCINATE 50 MG: 50 TABLET, EXTENDED RELEASE ORAL at 20:24

## 2020-02-07 RX ADMIN — ACETAMINOPHEN 650 MG: 325 TABLET, FILM COATED ORAL at 22:41

## 2020-02-07 RX ADMIN — PAROXETINE 20 MG: 20 TABLET, FILM COATED ORAL at 20:23

## 2020-02-07 RX ADMIN — OXYCODONE AND ACETAMINOPHEN 2 TABLET: 5; 325 TABLET ORAL at 02:06

## 2020-02-07 NOTE — PLAN OF CARE
Problem: Patient Care Overview  Goal: Plan of Care Review  Flowsheets (Taken 2/7/2020 1118)  Outcome Summary: Pt is a 80 yo female who presents s/p R TKA. Upon exam, pt demonstrates post op pain, impaired R knee ROM, R LE weakness, impaired balance, and decreased endurance limiting mobility. Pt was ambulating with rollator prior to admission. Currently requiring assist x1 for safety with mobility due to above impairments. Pt would benefit from continued PT to address impairments, improve safety, and increase independence with functional mobility. Rwx and bsc ordered for home use. Pt planning to DC home when able and continue with HH PT. No problems anticipated.

## 2020-02-07 NOTE — PROGRESS NOTES
"  Orthopaedic Surgery   Daily Progress Note  Dr. Lobo Sorto Sr  [phone]  DEMOGRAPHICS:   · Cierra Kc   · Age:79 y.o.   · MRN:[id number]  · Admitted: 2/6/2020    PROCEDURE: 1 Day Post-Op s/p Procedure(s):  TOTAL KNEE ARTHROPLASTY     /53 (BP Location: Right arm, Patient Position: Lying)   Pulse 71   Temp 97.3 °F (36.3 °C) (Oral)   Resp 16   Ht 175.3 cm (69\")   Wt 106 kg (233 lb 9 oz)   SpO2 97%   BMI 34.49 kg/m²     Lab Results (last 24 hours)     Procedure Component Value Units Date/Time    Basic Metabolic Panel [[id number]]  (Abnormal) Collected:  02/07/20 0322    Specimen:  Blood Updated:  02/07/20 0420     Glucose 114 mg/dL      BUN 7 mg/dL      Creatinine 0.66 mg/dL      Sodium 142 mmol/L      Potassium 4.1 mmol/L      Chloride 103 mmol/L      CO2 24.3 mmol/L      Calcium 8.4 mg/dL      eGFR Non African Amer 86 mL/min/1.73      BUN/Creatinine Ratio 10.6     Anion Gap 14.7 mmol/L     Narrative:       GFR Normal >60  Chronic Kidney Disease <60  Kidney Failure <15      Hemoglobin & Hematocrit, Blood [[id number]]  (Abnormal) Collected:  02/07/20 0322    Specimen:  Blood Updated:  02/07/20 0350     Hemoglobin 10.2 g/dL      Hematocrit 31.6 %           Imaging Results (Last 24 Hours)     Procedure Component Value Units Date/Time    XR Knee 1 or 2 View Right [[id number]] Collected:  02/06/20 1633     Updated:  02/06/20 1633    Narrative:       TWO-VIEW PORTABLE RIGHT KNEE     HISTORY: Knee replacement for osteoarthritis.     FINDINGS:  The patient has had recent total knee replacement and the  alignment appears satisfactory.                   Patient Care Team:  Jose Yoon MD as PCP - General  Luana Hernandez MD as Consulting Physician (Cardiology)    SUBJECTIVE  Comfortable  Alert and oriented    PHYSICAL EXAM  Neurovascular intact     ASSESSMENT: Patient is a 79 y.o. female who is 1 Day Post-Op s/p Procedure(s):  1.  TOTAL KNEE ARTHROPLASTY right  2.  Osteoarthritis of both " left and right hips  3.  History of DVT about 4 years ago.  She is not on anticoagulants other than aspirin routinely  4.  History of TIA  5.  Seizure disorder  6. GERD  7.  Hypertension  8.  She has balance issues that are in part age and mobility related and in part due to her arthritic knee.  9.  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  It will probably not be till Sunday though we will evaluate her tomorrow.    Lobo Sorto Sr, MD  Orthopaedic Surgery  [facility]  [phone]

## 2020-02-07 NOTE — PLAN OF CARE
POD #1 RTKA, VSS, island dressing over ZIPLINE, percocet for pain control, amublating at increased distances, worked with PT x 2, voiding per BRP, plan is for DC home with HH, possibly tomorrow
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
## 2020-02-07 NOTE — PROGRESS NOTES
Continued Stay Note  [facility]     Patient Name: Cierra Kc  MRN: [id number]  Today's Date: 2/7/2020    Admit Date: 2/6/2020    Discharge Plan     Row Name 02/07/20 1538       Plan    Plan  [facility]    Provided post acute provider list?  Yes    Post Acute Provider List  Home Health    Post Acute Provider Quality & Resource List  N/A    Delivered To  Patient    Method of Delivery  In person    Patient/Family in Agreement with Plan  yes    Plan Comments  Spoke with pt, verified correct information on facesheet and explained the role of CCP. Pt would like to d/c home with [facility], referral given to Becca with [facility] who states they are able to accept. Plan will be to d/c home with [facility] and family support. No other needs identified.     Final Discharge Disposition Code  06 - home with home health care    Final Note  Pt to d/c home with [facility].        Discharge Codes    No documentation.             Mary Ururtia RN
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
## 2020-02-07 NOTE — DISCHARGE PLACEMENT REQUEST
"Jamie Kc (79 y.o. Female)     Date of Birth Social Security Number Address Home Phone MRN    1940  [id number] [facility] [address] [phone] [id number]    Worship Marital Status          Religious        Admission Date Admission Type Admitting Provider Attending Provider Department, Room/Bed    2/6/20 Elective Lobo Sorto MD Sweet, Richard A, MD [address], 76/1    Discharge Date Discharge Disposition Discharge Destination                       Attending Provider:  Lobo Sorto MD    Allergies:  Clindamycin, Codeine, Codeine Sulfate, Erythromycin, Flu Virus Vaccine, Penicillins, Sulfa Antibiotics, Tetanus Toxoid, Tetanus-diphth-acell Pertussis, Tetanus-diphtheria Toxoids Td, Tetanus-diphtheria Toxoids Td, Naproxen    Isolation:  None   Infection:  None   Code Status:  CPR    Ht:  175.3 cm (69\")   Wt:  106 kg (233 lb 9 oz)    Admission Cmt:  None   Principal Problem:  None                Active Insurance as of 2/6/2020     Primary Coverage     Payor Plan Insurance Group Employer/Plan Group    MEDICARE MEDICARE A & B      Payor Plan Address Payor Plan Phone Number Payor Plan Fax Number Effective Dates    [address] [phone]  6/1/2005 - None Entered    [facility] [address]       Subscriber Name Subscriber Birth Date Member JAMIE GRANT 1940 [id number]           Secondary Coverage     Payor Plan Insurance Group Employer/Plan Group    [employer] SUP AAR HEALTH CARE OPTIONS      Payor Plan Address Payor Plan Phone Number Payor Plan Fax Number Effective Dates    [facility] [phone]  1/1/2016 - None Entered    [address]       [facility] [address]       Subscriber Name Subscriber Birth Date Member JAMIE GRANT 1940 [id number]                 Emergency Contacts      (Rel.) Home Phone Work Phone Mobile Phone    Christy Jaramillo (Grandchild) [phone] -- --        "

## 2020-02-07 NOTE — PLAN OF CARE
Problem: Patient Care Overview  Goal: Plan of Care Review  Outcome: Ongoing (interventions implemented as appropriate)  Flowsheets (Taken 2/7/2020 0316)  Progress: improving  Plan of Care Reviewed With: patient  Outcome Summary: Pain under control with pain meds. vitals stable. Neuro vacsular checks intact. Voiding without difficulty/ incontinence. Ambulated with ambulation aide. Tolerated well. Dressing noted with some moist drainage on ace wrap. Educated on blood pressure monitoring, verbalises understanding. Will continue to monitor.

## 2020-02-08 VITALS
RESPIRATION RATE: 16 BRPM | HEART RATE: 67 BPM | TEMPERATURE: 97.1 F | HEIGHT: 69 IN | DIASTOLIC BLOOD PRESSURE: 68 MMHG | OXYGEN SATURATION: 93 % | BODY MASS INDEX: 34.59 KG/M2 | SYSTOLIC BLOOD PRESSURE: 134 MMHG | WEIGHT: 233.56 LBS

## 2020-02-08 PROCEDURE — 97110 THERAPEUTIC EXERCISES: CPT

## 2020-02-08 PROCEDURE — 97150 GROUP THERAPEUTIC PROCEDURES: CPT

## 2020-02-08 RX ORDER — ACETAMINOPHEN 325 MG/1
650 TABLET ORAL EVERY 4 HOURS PRN
Qty: 30 TABLET | Refills: 0 | Status: SHIPPED | OUTPATIENT
Start: 2020-02-08

## 2020-02-08 RX ORDER — TRAMADOL HYDROCHLORIDE 50 MG/1
50 TABLET ORAL EVERY 6 HOURS PRN
Status: DISCONTINUED | OUTPATIENT
Start: 2020-02-08 | End: 2020-02-08 | Stop reason: HOSPADM

## 2020-02-08 RX ADMIN — ACETAMINOPHEN 650 MG: 325 TABLET, FILM COATED ORAL at 08:19

## 2020-02-08 RX ADMIN — ACETAMINOPHEN 650 MG: 325 TABLET, FILM COATED ORAL at 03:21

## 2020-02-08 RX ADMIN — RIVAROXABAN 10 MG: 10 TABLET, FILM COATED ORAL at 08:19

## 2020-02-08 RX ADMIN — SODIUM CHLORIDE, PRESERVATIVE FREE 3 ML: 5 INJECTION INTRAVENOUS at 08:19

## 2020-02-08 RX ADMIN — TRAMADOL HYDROCHLORIDE 50 MG: 50 TABLET, FILM COATED ORAL at 12:19

## 2020-02-08 RX ADMIN — FAMOTIDINE 20 MG: 20 TABLET, FILM COATED ORAL at 08:19

## 2020-02-08 RX ADMIN — FERROUS SULFATE TAB 325 MG (65 MG ELEMENTAL FE) 325 MG: 325 (65 FE) TAB at 08:19

## 2020-02-08 NOTE — THERAPY TREATMENT NOTE
Acute Care - Physical Therapy Treatment Note  [facility]     Patient Name: Cierra Kc  : 1940  MRN: [id number]  Today's Date: 2020             Admit Date: 2020    Visit Dx:    ICD-10-CM ICD-9-CM   1. Osteoarthritis of right knee, unspecified osteoarthritis type M17.11 715.96     Patient Active Problem List   Diagnosis   • Abnormal finding on thallium stress test   • Chest pain   • Atherosclerosis of coronary artery   • Essential hypertension   • Breath shortness   • Unstable angina pectoris (CMS/HCC)   • Disorder of joint   • History of cardiac catheterization   • Abscess of rectum   • Lumbar radiculopathy   • Degeneration of intervertebral disc of lumbar region   • Infected sebaceous cyst   • Cerebral hemorrhage (CMS/HCC)   • Hyperlipidemia   • Chronic diastolic heart failure (CMS/HCC)   • Depression   • Sebaceous cyst of skin of breast   • Moderate COPD (chronic obstructive pulmonary disease) (CMS/HCC)   • Chronic obstructive pulmonary disease (CMS/HCC)   • Community acquired pneumonia   • Chronic low back pain   • Abscess   • Hyperlipemia   • Acute non-ST-elevation myocardial infarction (CMS/HCC)   • Dyslipidemia   • Leg swelling   • DJD (degenerative joint disease) of knee       Therapy Treatment    Rehabilitation Treatment Summary     Row Name 20 1030             Treatment Time/Intention    Discipline  physical therapy assistant  -      Document Type  discharge treatment;therapy note (daily note)  -ABELARDO      Subjective Information  complains of;fatigue;pain;swelling  -ABELARDO      Mode of Treatment  group therapy;physical therapy  -      Care Plan Review  patient/other agree to care plan  -ABELARDO      Existing Precautions/Restrictions  fall  -      Recorded by [ABELARDO] Azalea Bassett PTA 20 7909      Row Name 20 1030             Sit-Stand Transfer    Sit-Stand [place] (Transfers)  contact guard;verbal cues  -ABELARDO      Assistive Device (Sit-Stand Transfers)  walker,  front-wheeled  -JM      Recorded by [] Azalea Bassett, BROOKS 02/08/20 1749      Row Name 02/08/20 1030             Gait/Stairs Assessment/Training    [place] Level (Gait)  contact guard;stand by assist  -      Assistive Device (Gait)  walker, front-wheeled  -      Distance in Feet (Gait)  80  -JM      Deviations/Abnormal Patterns (Gait)  antalgic;stride length decreased  -      Bilateral Gait Deviations  forward flexed posture  -      [place] Level (Stairs)  contact guard;verbal cues  -      Assistive Device (Stairs)  walker, front-wheeled  -      Handrail Location (Stairs)  none  -      Number of Steps (Stairs)  1  -JM      Ascending Technique (Stairs)  step-to-step  -JM      Descending Technique (Stairs)  step-to-step  -JM      Comment (Gait/Stairs)  backward w/supported wx  -      Recorded by [] Azalea Bassett PTA 02/08/20 1749      Row Name 02/08/20 1030             General ROM    GENERAL ROM COMMENTS  R knee -7-80  -      Recorded by [] Azalea Bassett PTA 02/08/20 1749      Row Name 02/08/20 1030             Therapeutic Exercise    Comment (Therapeutic Exercise)  TKR protocol x 25 reps, assist for last few SLRs  -      Recorded by [] Azalea Bassett PTA 02/08/20 1749      Row Name 02/08/20 1030             Positioning and Restraints    Pre-Treatment Position  sitting in chair/recliner  -JM      In Chair  reclined;call light within reach;encouraged to call for assist;exit alarm on  -      Recorded by [] Azalea Bassett PTA 02/08/20 1749      Row Name 02/08/20 1030             Pain Scale: Numbers Pre/Post-Treatment    Pain Scale: Numbers, Pretreatment  3/10  -JM      Pain Scale: Numbers, Post-Treatment  5/10  -JM      Pain Location - Side  Right  -JM      Pain Location  knee  -      Pain Intervention(s)  Medication (See MAR);Repositioned;Cold applied  -      Recorded by [] Azalea Bassett, [facility] 02/08/20 1749      Row Name                Wound 02/06/20 7812  Right anterior knee Incision    Wound - Properties Group Date first assessed: 02/06/20 [MB] Time first assessed: 1432 [MB] Side: Right [MB] Orientation: anterior [MB] Location: knee [MB] Primary Wound Type: Incision [MB] Recorded by:  [MB] Alka Muniz RN 02/06/20 1435      User Key  (r) = Recorded By, (t) = Taken By, (c) = Cosigned By    Initials Name Effective Dates Discipline    Alka Joseph RN 06/16/16 -  Nurse    Azalea Meek PTA 03/07/18 -  PT          Wound 02/06/20 1432 Right anterior knee Incision (Active)   Dressing Appearance dry;intact 2/8/2020 12:10 PM   Closure MARIA ELENA 2/8/2020 12:10 PM   Base dressing in place, unable to visualize 2/8/2020 12:10 PM   Drainage Amount small 2/8/2020 12:10 PM   Dressing Care, Wound dressing changed 2/8/2020 12:10 PM               PT Recommendation and Plan           Time Calculation:   PT Charges     Row Name 02/08/20 1749             Time Calculation    Start Time  1040  -      Stop Time  1130  -      Time Calculation (min)  50 min  -ABELARDO      PT Received On  02/08/20  -         Time Calculation- PT    Total Timed Code Minutes- PT  23 minute(s)  -ABELARDO        User Key  (r) = Recorded By, (t) = Taken By, (c) = Cosigned By    Initials Name Provider Type    Azalea Meek PTA Physical Therapy Assistant        Therapy Charges for Today     Code Description Service Date Service Provider Modifiers Qty    18570658039 HC PT THER PROC EA 15 MIN 2/8/2020 Azalea Bassett PTA GP 2    60515266967 HC PT THER PROC GROUP 2/8/2020 Azalea Bassett PTA GP 1          PT G-Codes  Outcome Measure Options: AM-PAC 6 Clicks Basic Mobility (PT)  AM-PAC 6 Clicks Score (PT): 20    Azalea Bassett PTA  2/8/2020

## 2020-02-08 NOTE — PLAN OF CARE
Problem: Patient Care Overview  Goal: Plan of Care Review  Outcome: Ongoing (interventions implemented as appropriate)  Flowsheets  Taken 2/8/2020 0256 by Ramana Hsu, RN  Progress: improving  Taken 2/8/2020 0819 by Astrid Tate, RN  Plan of Care Reviewed With: patient  Taken 2/8/2020 1325 by Astrid Tate, RN  Outcome Summary: pt POD  2 RTK. VSS. Dressing changed. CDI. NVI. pt confused. but getting better. Medication changed to ultram. tolerated better. pt worked with therapy. family at bedside. up to bathroom. pt to be D/C'd home with HH. Educated on the importance of BP monitoring and the use of medications as ordered for HO HTN. will cont to monitor.

## 2020-02-08 NOTE — DISCHARGE SUMMARY
Discharge Summary   Lobo Sorto M.D.    NAME: Cierra Kc ADMIT: 2020   : 1940  PCP: Jose Yoon MD    MRN: [id number] LOS: 2 days   AGE/SEX: 79 y.o. female  ROOM: P776/1       Date of Discharge:  20    Primary Discharge Diagnosis:  DJD (degenerative joint disease) of knee [M17.10]    Secondary Discharge Diagnosis:    Problem List Items Addressed This Visit        Musculoskeletal and Integument    DJD (degenerative joint disease) of knee - Primary    Relevant Orders    Walker    Commode Chair    Referral to home health          Procedures Performed:  Right Total Knee Arthroplasty    Hospital Course:    Cierra Kc is a 79 y.o.  female who underwent successful Right Total Knee Arthroplasty on 2020.  Cierra Kc was started on Aspirin 325mg po daily immediately post-operatively for DVT prophylaxis.  On post-op day 1 the patients dressing was changed, drain removed and their incision was clean, with no signs of infection and their calf was soft, with no signs of DVT.  The patient progressed well with physical therapy and the patients hemoglobin remained stable. On post-operative day two the patient was felt ready for discharge.      Total Knee Joint Replacement Discharge Instructions:    I. ACTIVITIES:    1. Exercises:  ? Complete exercise program as taught by the hospital physical therapist 2 times per day  ? Exercise program will be advanced by the physical therapist  ? During the day be up ambulating every 2 hours (while awake) for short distances  ? Complete the ankle pump exercises at least 10 times per hour (while awake)  ? Elevate legs most of the day the first week post operatively and thereafter elevate legs when in bed and for at least 30 minutes during the day. Caution must be taken to avoid pillow placement under the bend of the knee as this can led to flexion contractures of the knee.  ? Use cold packs 20-30 minutes approximately 5 times per day. This should  be done before and after completing your exercises and at any time you are experiencing pain/ stiffness in your operative extremity.    2. Activities of Daily Living:  ? No tub baths, hot tubs, or swimming pools for 4 weeks  ? May shower and let water run over the incision on post-operative day #7 if no drainage. Do not scrub or rub the incision. Simply let the water run over the incision and pat dry.    II. Precautions:    ? Everyone that comes near you should wash their hands  ? No elective dental, genital-urinary, or colon procedures or surgical procedures for 12 weeks after surgery unless absolutely necessary.  ?  If dental work or surgical procedure is deemed absolutely necessary during the first 12 weeks, you will need to contact your surgeon as you will need to take antibiotics 1 hour prior to any dental work (including teeth cleanings).  ? Please discuss with your surgeon prophylactic antibiotics as the length of time this intervention will be necessary for you varies with each patient’s health history and situation.  ? Avoid sick people. If you must be around someone who is ill, they should wear a mask.  ? Avoid visits to the Emergency Room or Urgent Care unless you are having a life threatening event.     III. INCISION CARE:    ? Wash your hands prior to dressing changes  ? Change the dressing as needed to keep incision clean and dry. Utilize dry gauze and paper tape. Avoid touching the side of the gauze that goes against the incision with your hands.  ? No creams or ointments to the incision  ? May remove dressing once the incision is free of drainage  ? Do not touch or pick at the incision  ? Check incision every day and notify surgeon immediately if any of the following signs or symptoms are noted:  o Increase in redness  o Increase in swelling around the incision and of the entire extremity  o Increase in pain  o Drainage oozing from the incision  o Pulling apart of the edges of the  incision  o Increase in overall body temperature (greater than 100.5 degrees)  ? Your surgeon will instruct you regarding suture or staple removal    IV. Medications:     1. Anticoagulants: You will be discharged on an anticoagulant. This is a prophylactic medication that helps prevent blood clots during your post-operative period. The type and length of dosage varies based on your individual needs, procedure performed, and surgeon’s preference.    ? While taking the anticoagulant, you should avoid taking any additional aspirin, ibuprofen (Advil or Motrin), Aleve (Naprosyn) or other non-steroidal anti-inflammatory medications.   ? Notify surgeon immediately if any nadia bleeding is noted in the urine, stool, emesis, or from the nose or the incision. Blood in the stool will often appear as black rather than red. Blood in urine may appear as pink. Blood in emesis may appear as brown/black like coffee grounds.  ? You will need to apply pressure for longer periods of time to any cuts or abrasions to stop bleeding  ? Avoid alcohol while taking anticoagulants    2. Stool Softeners: You will be at greater risk of constipation after surgery due to being less mobile and the pain medications.   ? Take stool softeners as instructed by your surgeon while on pain medications. Bran cereal is most effective. Over the counter Colace 100 mg 1-2 capsules twice daily.   ? Drink plenty of fluids, and eat fruits and vegetables during your recovery time    3. Pain Medications utilized after surgery are narcotics and the law requires that the following information be given to all patients that are prescribed narcotics:  ? CLASSIFICATION: Pain medications are called Opioids and are narcotics  ? LEGALITIES: It is illegal to share narcotics with others and to drive within 24 hours of taking narcotics  ? POTENTIAL SIDE EFFECTS: Potential side effects of opioids include: nausea, vomiting, itching, dizziness, drowsiness, dry mouth,  constipation, and difficulty urinating.  ? POTENTIAL ADVERSE EFFECTS:   o Opioid tolerance can develop with use of pain medications and this simply means that it requires more and more of the medication to control pain; however, this is seen more in patients that use opioids for longer periods of time.  o Opioid dependence can develop with use of Opioids and this simply means that to stop the medication can cause withdrawal symptoms; however, this is seen with patients that use Opioids for longer periods of time.  o Opioid addiction can develop with use of Opioids and the incidence of this is very unlikely in patients who take the medications as ordered and stop the medications as instructed.  o Opioid overdose can be dangerous, but is unlikely when the medication is taken as ordered and stopped when ordered. It is important not to mix opioids with alcohol or with and type of sedative such as Benadryl as this can lead to over sedation and respiratory difficulty.  ? DOSAGE:   o Pain medications will need to be taken consistently for the first week to decrease pain and promote adequate pain relief and participation in physical therapy.  o After the initial surgical pain begins to resolve, you may begin to decrease the pain medication. By the end of 6-8 weeks, you should be off of pain medications.  o Refills will not be given by the office during evening hours, on weekends, or after 6-8 weeks post-op.  o To seek refills on pain medications during the initial 6 week post-operative period, you must call the office 48 hours in advance to request the refill. The office will then notify you when to  the prescription. DO NOT wait until you are out of the medication to request a refill.    V. FOLLOW-UP VISITS:  ? You will need to follow up in the office with your surgeon in 3 weeks. Please call this number [phone] to schedule this appointment.  If you have any concerns or suspected complications prior to your  follow up visit, please call your surgeons office. Do not wait until your appointment time if you suspect complications. These will need to be addressed in the office promptly.      Discharge Medications:     1) ULTRAM 50 MG  1-2 po q 4-6 hours for pain control  2)  Xarelto 10 mg x's 3 weeks      MARIO Jesus  2/8/2020  8:50 AM

## 2020-02-08 NOTE — PROGRESS NOTES
"/56 (BP Location: Right arm, Patient Position: Lying)   Pulse 76   Temp 97.3 °F (36.3 °C) (Oral)   Resp 16   Ht 175.3 cm (69\")   Wt 106 kg (233 lb 9 oz)   SpO2 91%   BMI 34.49 kg/m²     Results from last 7 days   Lab Units 02/07/20  0322   HEMOGLOBIN g/dL 10.2*   HEMATOCRIT % 31.6*       Results from last 7 days   Lab Units 02/07/20  0322   SODIUM mmol/L 142   POTASSIUM mmol/L 4.1   CHLORIDE mmol/L 103   CO2 mmol/L 24.3   BUN mg/dL 7*   CREATININE mg/dL 0.66   GLUCOSE mg/dL 114*   CALCIUM mg/dL 8.4*       Imaging Results (Last 24 Hours)     ** No results found for the last 24 hours. **          Patient Care Team:  Jose Yoon MD as PCP - General  Luana Hernandez MD as Consulting Physician (Cardiology)    SUBJECTIVE  Percocet caused confusion.  Now taking tylenol  PHYSICAL EXAM  Wound with bloody drainage but no erythema     DJD (degenerative joint disease) of knee      PLAN / DISPOSITION:  Keep sterile dry dressing on wound  Xarelto 10 mg for 3 weeks  D/C with MARIO Go  02/08/20  8:44 AM    "

## 2020-02-08 NOTE — PLAN OF CARE
Problem: Patient Care Overview  Goal: Plan of Care Review  Outcome: Ongoing (interventions implemented as appropriate)  Flowsheets (Taken 2/8/2020 0256)  Progress: improving  Plan of Care Reviewed With: patient  Outcome Summary: POD 1 RTKA. VSS, pain well controlled with prn tylenol at this time, per client and family percocet seems to be causing some confusion - client can answer all questions appropriately, but is confused as to when her surgery was and which room she is in. client requested this nurse to contact family in order  that they may stay the night in room with client; son and daughter in law at beside at this time. tolerates ambulation well with assist x1, walker, and gait belt. plans to d.c home with  2/8. discussed BP monitoring r/t hx HTN, will continue to monitor.  Goal: Individualization and Mutuality  Outcome: Ongoing (interventions implemented as appropriate)  Goal: Discharge Needs Assessment  Outcome: Ongoing (interventions implemented as appropriate)  Goal: Interprofessional Rounds/Family Conf  Outcome: Ongoing (interventions implemented as appropriate)     Problem: Pain, Chronic (Adult)  Goal: Acceptable Pain/Comfort Level and Functional Ability  Outcome: Ongoing (interventions implemented as appropriate)  Flowsheets (Taken 2/8/2020 0256)  Acceptable Pain/Comfort Level and Functional Ability: making progress toward outcome     Problem: Fall Risk (Adult)  Goal: Absence of Fall  Outcome: Ongoing (interventions implemented as appropriate)  Flowsheets (Taken 2/8/2020 0256)  Absence of Fall: achieves outcome     Problem: Knee Arthroplasty (Total, Partial) (Adult)  Goal: Signs and Symptoms of Listed Potential Problems Will be Absent, Minimized or Managed (Knee Arthroplasty)  Outcome: Ongoing (interventions implemented as appropriate)  Flowsheets (Taken 2/8/2020 0256)  Problems Assessed (Knee Arthroplasty): functional deficit; pain; situational response  Goal: Anesthesia/Sedation  Recovery  Outcome: Ongoing (interventions implemented as appropriate)  Flowsheets (Taken 2/8/2020 0256)  Anesthesia/Sedation Recovery: progressing toward baseline

## 2020-02-09 ENCOUNTER — READMISSION MANAGEMENT (OUTPATIENT)
Dept: CALL CENTER | Facility: HOSPITAL | Age: 80
End: 2020-02-09

## 2020-02-09 NOTE — OUTREACH NOTE
Prep Survey      Responses   Facility patient discharged from?  [place]   Is patient eligible?  Yes   Discharge diagnosis  DJD of knee, s/p right total knee arthroplasty   Does the patient have one of the following disease processes/diagnoses(primary or secondary)?  Total Joint Replacement   Does the patient have Home health ordered?  Yes   What is the Home health agency?   [facility]   Is there a DME ordered?  Yes   What DME was ordered?  Walker and rimma aguirre    Comments regarding appointments  Pt to schedule F/U appointments   Prep survey completed?  Yes          Cecilia De La Cruz RN

## 2020-02-11 ENCOUNTER — READMISSION MANAGEMENT (OUTPATIENT)
Dept: CALL CENTER | Facility: HOSPITAL | Age: 80
End: 2020-02-11

## 2020-02-12 ENCOUNTER — READMISSION MANAGEMENT (OUTPATIENT)
Dept: CALL CENTER | Facility: HOSPITAL | Age: 80
End: 2020-02-12

## 2020-02-12 NOTE — OUTREACH NOTE
Total Joint Week 2 Survey      Responses   Facility patient discharged from?  [place]   Does the patient have one of the following disease processes/diagnoses(primary or secondary)?  Total Joint Replacement   Joint surgery performed?  Knee   Week 2 attempt successful?  Yes   Call start time  0849   Call end time  0852   Has the patient been back in either the hospital or Emergency Department since discharge?  No   Discharge diagnosis  DJD of knee, s/p right total knee arthroplasty   Does the patient have all medications related to this admission filled (includes all antibiotics, pain medications, etc.)  Yes   Is the patient taking all medications as directed (includes completed medication regime)?  Yes   Is the patient able to teach back alternate methods of pain control?  Ice, Reposition, Correct alignment, Knee-elevation/no pillow under knee   Comments regarding appointments  Pt to schedule F/U appointments   Does the patient have a follow up appointment with their surgeon?  Yes   Has the patient kept scheduled appointments due by today?  Yes   What is the Home health agency?   [facility]   Has home health visited the patient within 72 hours of discharge?  Yes   What DME was ordered?  Walker and commode chair    Has all DME been delivered?  Yes   Psychosocial issues?  No   Has the patient began therapy sessions (either in the home or as an out patient)?  Yes   Does the patient have a wound vac in place?  N/A   Has the patient fallen since discharge?  No   If the patient has fallen, were there any injuries?  No   Comments   reports she is doing good. No s/s of infection.    Did the patient receive a copy of their discharge instructions?  Yes   Nursing interventions  Reviewed instructions with patient   What is the patient's perception of their functional status since discharge?  Improving   Is the patient able to teach back signs and symptoms of infection?  Incisional drainage, Blisters around incision, Temp  >100.4 for 24h or longer, Increased swelling or redness around incision (not associated with surgical edema), Changes in mobility, Shortness of breath or chest pain   Is the patient able to teach back how to prevent infection?  Check incision daily, No tub baths, hot tub or swimming, No lotion or creams   Is the patient able to teach back signs and symptoms of DVT?  Redness in calf, Area hot to touch, Shortness of breath or chest pain, Swelling in calf, Severe pain in calf   Is the patient able to teach back home safety measures?  Ability to shower, Accessibility to necessary areas in home, Modifications to reach items, Modifications with ADLs such as dressing, cooking, toileting   Did the patient implement home safety suggestions from pre-surgery classes if attended?  Yes   Is the patient/caregiver able to teach back the hierarchy of who to call/visit for symptoms/problems? PCP, Specialist, Home health nurse, Urgent Care, ED, 911  Yes   Week 2 call completed?  Yes          Anjum Washington RN

## 2020-02-12 NOTE — OUTREACH NOTE
Total Joint Week 1 Survey      Responses   Facility patient discharged from?  [place]   Does the patient have one of the following disease processes/diagnoses(primary or secondary)?  Total Joint Replacement   Is there a successful TCM telephone encounter documented?  No   Joint surgery performed?  Knee   Week 1 attempt successful?  Yes   Call start time  1829   Call end time  1833   Has the patient been back in either the hospital or Emergency Department since discharge?  No   Discharge diagnosis  DJD of knee, s/p right total knee arthroplasty   Is patient permission given to speak with other caregiver?  No   Does the patient have all medications related to this admission filled (includes all antibiotics, pain medications, etc.)  Yes   Is the patient taking all medications as directed (includes completed medication regime)?  Yes   Is the patient able to teach back alternate methods of pain control?  Ice, Reposition, Correct alignment, Knee-elevation/no pillow under knee   Comments regarding appointments  Pt to schedule F/U appointments   Does the patient have a follow up appointment with their surgeon?  Yes   Has the patient kept scheduled appointments due by today?  Yes   What is the Home health agency?   [facility]   Has home health visited the patient within 72 hours of discharge?  Yes   What DME was ordered?  Walker and commode chair    Has all DME been delivered?  Yes   Psychosocial issues?  No   When is the first therapy visit scheduled (PO Day) including how many days per week   day 6    Has the patient began therapy sessions (either in the home or as an out patient)?  Yes   Does the patient have a wound vac in place?  N/A   Has the patient fallen since discharge?  No   If the patient has fallen, were there any injuries?  No   Did the patient receive a copy of their discharge instructions?  No   Nursing interventions  Reviewed instructions with patient   What is the patient's perception of their functional status  since discharge?  Improving   Is the patient able to teach back signs and symptoms of infection?  Incisional drainage, Blisters around incision, Temp >100.4 for 24h or longer, Increased swelling or redness around incision (not associated with surgical edema), Changes in mobility, Shortness of breath or chest pain   Is the patient able to teach back how to prevent infection?  Check incision daily, No tub baths, hot tub or swimming, No lotion or creams   Is the patient able to teach back signs and symptoms of DVT?  Redness in calf, Area hot to touch, Shortness of breath or chest pain, Swelling in calf, Severe pain in calf   Is the patient able to teach back home safety measures?  Ability to shower, Accessibility to necessary areas in home, Modifications to reach items, Modifications with ADLs such as dressing, cooking, toileting   Did the patient implement home safety suggestions from pre-surgery classes if attended?  Yes   Is the patient/caregiver able to teach back the hierarchy of who to call/visit for symptoms/problems? PCP, Specialist, Home health nurse, Urgent Care, ED, 911  Yes   Week 1 call completed?  Yes          Yuridia Clancy RN

## 2020-02-26 ENCOUNTER — READMISSION MANAGEMENT (OUTPATIENT)
Dept: CALL CENTER | Facility: HOSPITAL | Age: 80
End: 2020-02-26

## 2020-02-26 NOTE — OUTREACH NOTE
Total Joint Month 1 Survey      Responses   Facility patient discharged from?  [place]   Does the patient have one of the following disease processes/diagnoses(primary or secondary)?  Total Joint Replacement   Joint surgery performed?  Knee   Month 1 attempt successful?  No   Unsuccessful attempts  Attempt 1          Astrid Sotomayor RN

## 2020-02-27 ENCOUNTER — READMISSION MANAGEMENT (OUTPATIENT)
Dept: CALL CENTER | Facility: HOSPITAL | Age: 80
End: 2020-02-27

## 2020-02-27 NOTE — OUTREACH NOTE
Total Joint Month 1 Survey      Responses   Facility patient discharged from?  [place]   Does the patient have one of the following disease processes/diagnoses(primary or secondary)?  Total Joint Replacement   Joint surgery performed?  Knee   Month 1 attempt successful?  No   Unsuccessful attempts  Attempt 2          Rashmi Baker RN

## 2021-03-29 ENCOUNTER — OFFICE VISIT (OUTPATIENT)
Dept: CARDIOLOGY | Facility: CLINIC | Age: 81
End: 2021-03-29

## 2021-03-29 VITALS
HEART RATE: 73 BPM | DIASTOLIC BLOOD PRESSURE: 82 MMHG | WEIGHT: 215 LBS | SYSTOLIC BLOOD PRESSURE: 145 MMHG | BODY MASS INDEX: 31.84 KG/M2 | HEIGHT: 69 IN

## 2021-03-29 DIAGNOSIS — R06.02 SHORTNESS OF BREATH: ICD-10-CM

## 2021-03-29 DIAGNOSIS — M79.89 LEG SWELLING: ICD-10-CM

## 2021-03-29 DIAGNOSIS — R07.2 PRECORDIAL PAIN: ICD-10-CM

## 2021-03-29 DIAGNOSIS — I10 ESSENTIAL HYPERTENSION: ICD-10-CM

## 2021-03-29 DIAGNOSIS — E78.5 HYPERLIPIDEMIA, UNSPECIFIED HYPERLIPIDEMIA TYPE: Primary | ICD-10-CM

## 2021-03-29 PROCEDURE — 93000 ELECTROCARDIOGRAM COMPLETE: CPT | Performed by: INTERNAL MEDICINE

## 2021-03-29 PROCEDURE — 99213 OFFICE O/P EST LOW 20 MIN: CPT | Performed by: INTERNAL MEDICINE

## 2021-03-29 NOTE — PROGRESS NOTES
"1 YR FOLLOW UP   Subjective:        Cierra Kc is a 80 y.o. female who here for follow up    CC  OCCASIONAL SMOTHERING  HPI  80-year-old female with atypical chest pain, benign essential arterial hypertension as well as hyperlipidemia has been complaining of smothering sensation moderate in intensity     Problems Addressed this Visit        Cardiac and Vasculature    Chest pain    Essential hypertension    Hyperlipidemia - Primary       Pulmonary and Pneumonias    Shortness of breath       Symptoms and Signs    Leg swelling      Diagnoses       Codes Comments    Hyperlipidemia, unspecified hyperlipidemia type    -  Primary ICD-10-CM: E78.5  ICD-9-CM: 272.4     Essential hypertension     ICD-10-CM: I10  ICD-9-CM: 401.9     Precordial pain     ICD-10-CM: R07.2  ICD-9-CM: 786.51     Leg swelling     ICD-10-CM: M79.89  ICD-9-CM: 729.81     Shortness of breath     ICD-10-CM: R06.02  ICD-9-CM: 786.05         .    The following portions of the patient's history were reviewed and updated as appropriate: allergies, current medications, past family history, past medical history, past social history, past surgical history and problem list.    Past Medical History:   Diagnosis Date   • Cervical disc disease    • COPD (chronic obstructive pulmonary disease) (CMS/[facility])    • Coronary artery disease    • DDD (degenerative disc disease), lumbar    • Diastolic CHF (CMS/[facility])    • GERD (gastroesophageal reflux disease)    • History of DVT of lower extremity 1970s, 1980s    x 2   • History of MRSA infection     IT STATED SHE THINKS IT WAS ON HER \"ARMS.\" STATED IT'S \"WAS A LONG TIME AGO.\"   • Hyperlipidemia    • Hypertension    • Rectal abscess    • Seizures (CMS/[facility])    • Stress incontinence    • Stroke (CMS/[facility]) 2001   • Vitamin B 12 deficiency      reports that she quit smoking about 30 years ago. She has a 10.00 pack-year smoking history. She has never used smokeless tobacco. She reports current alcohol use. She reports that she " "does not use drugs.   Family History   Problem Relation Age of Onset   • Hypertension Mother    • Hyperlipidemia Mother    • Hypertension Father    • COPD Father    • Stroke Brother    • Clotting disorder Brother    • Hypertension Brother    • Hyperlipidemia Brother    • Diabetes Maternal Aunt    • Diabetes Maternal Uncle    • Heart disease Maternal Grandmother    • Stroke Maternal Grandmother    • Diabetes Maternal Grandmother    • Malig Hyperthermia Neg Hx        Review of Systems  Constitutional: No wt loss, fever, fatigue  Gastrointestinal: No nausea, abdominal pain  Behavioral/Psych: No insomnia or anxiety   Cardiovascular shortness of breath  Objective:       Physical Exam  /82   Pulse 73   Ht 175.3 cm (69\")   Wt 97.5 kg (215 lb)   BMI 31.75 kg/m²   General appearance: No acute changes   Neck: Trachea midline; NECK, supple, no thyromegaly or lymphadenopathy   Lungs: Normal size and shape, normal breath sounds, equal distribution of air, no rales and rhonchi   CV: S1-S2 regular, no murmurs, no rub, no gallop   Abdomen: Soft, non-tender; no masses , no abnormal abdominal sounds   Extremities: No deformity , normal color , no peripheral edema   Skin: Normal temperature, turgor and texture; no rash, ulcers            ECG 12 Lead    Date/Time: 3/29/2021 2:03 PM  Performed by: Luana Hernandez MD  Authorized by: Luana Hernandez MD   Comparison: compared with previous ECG   Similar to previous ECG  Rhythm: sinus rhythm  ST Flattening: all    Clinical impression: non-specific ECG              Echocardiogram:        Current Outpatient Medications:   •  acetaminophen (TYLENOL) 325 MG tablet, Take 2 tablets by mouth Every 4 (Four) Hours As Needed for Mild Pain ., Disp: 30 tablet, Rfl: 0  •  amLODIPine (NORVASC) 5 MG tablet, Take 5 mg by mouth Every Night., Disp: , Rfl:   •  atorvastatin (LIPITOR) 10 MG tablet, Take 10 mg by mouth Every Night., Disp: , Rfl:   •  divalproex (DEPAKOTE) 500 MG DR" tablet, Take 500 mg by mouth Every Night., Disp: , Rfl:   •  famotidine (PEPCID) 20 MG tablet, Take 20 mg by mouth 2 (Two) Times a Day., Disp: , Rfl:   •  furosemide (LASIX) 20 MG tablet, Take 20 mg by mouth As Needed., Disp: , Rfl:   •  metoprolol succinate XL (TOPROL-XL) 50 MG 24 hr tablet, Take 50 mg by mouth Every Night., Disp: , Rfl:   •  Multiple Vitamins-Minerals (MULTI COMPLETE PO), Take 1 tablet by mouth Daily. HOLD FOR SURGERY, Disp: , Rfl:   •  PARoxetine (PAXIL) 20 MG tablet, Take 20 mg by mouth Every Night., Disp: , Rfl:   •  saccharomyces boulardii (FLORASTOR) 250 MG capsule, Take 250 mg by mouth 2 (Two) Times a Day., Disp: , Rfl:   •  TURMERIC PO, Take 1 tablet by mouth Daily. HOLD FOR SURGERY, Disp: , Rfl:   •  nitroglycerin (NITROSTAT) 0.4 MG SL tablet, Place 0.4 mg under the tongue Every 5 (Five) Minutes As Needed for Chest Pain., Disp: , Rfl:    Assessment:        Patient Active Problem List   Diagnosis   • Abnormal finding on thallium stress test   • Chest pain   • Atherosclerosis of coronary artery   • Essential hypertension   • Breath shortness   • Unstable angina pectoris (CMS/HCC)   • Disorder of joint   • History of cardiac catheterization   • Abscess of rectum   • Lumbar radiculopathy   • Degeneration of intervertebral disc of lumbar region   • Infected sebaceous cyst   • Cerebral hemorrhage (CMS/HCC)   • Hyperlipidemia   • Chronic diastolic heart failure (CMS/HCC)   • Depression   • Sebaceous cyst of skin of breast   • Moderate COPD (chronic obstructive pulmonary disease) (CMS/HCC)   • Chronic obstructive pulmonary disease (CMS/HCC)   • Community acquired pneumonia   • Chronic low back pain   • Abscess   • Hyperlipemia   • Acute non-ST-elevation myocardial infarction (CMS/HCC)   • Dyslipidemia   • Leg swelling   • DJD (degenerative joint disease) of knee               Plan:            ICD-10-CM ICD-9-CM   1. Hyperlipidemia, unspecified hyperlipidemia type  E78.5 272.4   2. Essential  hypertension  I10 401.9   3. Precordial pain  R07.2 786.51   4. Leg swelling  M79.89 729.81   5. Shortness of breath  R06.02 786.05     1. Hyperlipidemia, unspecified hyperlipidemia type  Continue current treatment    2. Essential hypertension  Pressure under control    3. Precordial pain  Atypical    4. Leg swelling  Cierra Kc has been complaining of the leg swelling, appears dependent pedal edema, significantly contributed with a venous insufficiency.    Strong recommendations of elevating the legs as well as using support hoses, along with the control of fluids and salt restriction has been explained in details      5. Shortness of breath  .  Multifactorial       SEE IN 1 YR  COUNSELING:    Cierra StricklandEstevaneling was given to patient for the following topics: diagnostic results, risk factor reductions, impressions, risks and benefits of treatment options and importance of treatment compliance .       SMOKING COUNSELING:    [unfilled]    Dictated using Dragon dictation

## 2022-05-05 NOTE — PROGRESS NOTES
" Subjective:        Cierra Kc is a 81 y.o. female who here for follow up    Chief Complaint   Patient presents with   • Follow-up     1 yr       HPI    This is a 81 year old female that is new to this provider. She follows up for management of CAD. She has CAD, hypertension, hyperlipidemia, chronic diastolic chf, chronic back pain, COPD, GERD.  Stress test 1/27/2020 myocardial perfusion imaging with no evidence of ischemia. Ech 1/7/20 EF hyperdynamic 63%, RVc mildy dilated.     Reports that she has heaviness in her chest sometimes like a pressure, sudden onset. Resolves on its own, lasting only 3-4min at a time, she reports she sets down \"real quick\" and this resolves it.     The following portions of the patient's history were reviewed and updated as appropriate: allergies, current medications, past family history, past medical history, past social history, past surgical history and problem list.    Past Medical History:   Diagnosis Date   • Cervical disc disease    • COPD (chronic obstructive pulmonary disease) ([facility])    • Coronary artery disease    • DDD (degenerative disc disease), lumbar    • Diastolic CHF ([facility])    • GERD (gastroesophageal reflux disease)    • History of DVT of lower extremity 1970s, 1980s    x 2   • History of MRSA infection     IT STATED SHE THINKS IT WAS ON HER \"ARMS.\" STATED IT'S \"WAS A LONG TIME AGO.\"   • Hyperlipidemia    • Hypertension    • Rectal abscess    • Seizures ([facility])    • Stress incontinence    • Stroke ([facility]) 2001   • Vitamin B 12 deficiency          reports that she quit smoking about 31 years ago. She has a 10.00 pack-year smoking history. She has never used smokeless tobacco. She reports current alcohol use. She reports that she does not use drugs.     Family History   Problem Relation Age of Onset   • Hypertension Mother    • Hyperlipidemia Mother    • Hypertension Father    • COPD Father    • Stroke Brother    • Clotting disorder Brother    • Hypertension Brother    • " Hyperlipidemia Brother    • Diabetes Maternal Aunt    • Diabetes Maternal Uncle    • Heart disease Maternal Grandmother    • Stroke Maternal Grandmother    • Diabetes Maternal Grandmother    • Malig Hyperthermia Neg Hx        ROS     Review of Systems  Constitutional: No wt loss, fever, fatigue  Gastrointestinal: No nausea, abdominal pain  Behavioral/Psych: No insomnia or anxiety  Cardiovascular + chest pain or no shortness of breath      Objective:           Vitals and nursing note reviewed.   Constitutional:       Appearance: Well-developed.   HENT:      Head: Normocephalic.      Right Ear: External ear normal.      Left Ear: External ear normal.   Neck:      Vascular: No JVD.   Pulmonary:      Effort: Pulmonary effort is normal. No respiratory distress.      Breath sounds: Normal breath sounds. No stridor. No rales.   Cardiovascular:      Normal rate. Regular rhythm.      No gallop.   Pulses:     Intact distal pulses.   Abdominal:      General: Bowel sounds are normal. There is no distension.      Palpations: Abdomen is soft.      Tenderness: There is no abdominal tenderness. There is no guarding.   Musculoskeletal: Normal range of motion.         General: No tenderness.      Cervical back: Normal range of motion. Skin:     General: Skin is warm.   Neurological:      Mental Status: Alert and oriented to person, place, and time.      Deep Tendon Reflexes: Reflexes are normal and symmetric.   Psychiatric:         Judgment: Judgment normal.           ECG 12 Lead    Date/Time: 5/6/2022 10:07 AM  Performed by: Amelia Tucker APRN  Authorized by: Amelia Tucker APRN   Comparison: compared with previous ECG from 3/29/2021  Similar to previous ECG  Rhythm: sinus rhythm  Rate: normal  BPM: 65  T flattening: all    Clinical impression: abnormal EKG            Interpretation Summary    · Right ventricular cavity is mildly dilated.  · Calculated EF = 63%.  · There is no evidence of pericardial  effusion.    Interpretation Summary       · Findings consistent with a normal ECG stress test.  · Left ventricular ejection fraction is hyperdynamic (Calculated EF > 70%).  · Myocardial perfusion imaging indicates a normal myocardial perfusion study with no evidence of ischemia.  · Impressions are consistent with a low risk study.          Current Outpatient Medications:   •  acetaminophen (TYLENOL) 325 MG tablet, Take 2 tablets by mouth Every 4 (Four) Hours As Needed for Mild Pain ., Disp: 30 tablet, Rfl: 0  •  amLODIPine (NORVASC) 5 MG tablet, Take 5 mg by mouth Every Night., Disp: , Rfl:   •  atorvastatin (LIPITOR) 10 MG tablet, Take 10 mg by mouth Every Night., Disp: , Rfl:   •  divalproex (DEPAKOTE) 500 MG DR tablet, Take 500 mg by mouth Every Night., Disp: , Rfl:   •  famotidine (PEPCID) 20 MG tablet, Take 20 mg by mouth 2 (Two) Times a Day., Disp: , Rfl:   •  furosemide (LASIX) 20 MG tablet, Take 20 mg by mouth As Needed., Disp: , Rfl:   •  metoprolol succinate XL (TOPROL-XL) 50 MG 24 hr tablet, Take 50 mg by mouth Every Night., Disp: , Rfl:   •  Multiple Vitamins-Minerals (MULTI COMPLETE PO), Take 1 tablet by mouth Daily. HOLD FOR SURGERY, Disp: , Rfl:   •  nitroglycerin (NITROSTAT) 0.4 MG SL tablet, Place 0.4 mg under the tongue Every 5 (Five) Minutes As Needed for Chest Pain., Disp: , Rfl:   •  PARoxetine (PAXIL) 20 MG tablet, Take 20 mg by mouth Every Night., Disp: , Rfl:   •  saccharomyces boulardii (FLORASTOR) 250 MG capsule, Take 250 mg by mouth 2 (Two) Times a Day., Disp: , Rfl:   •  TURMERIC PO, Take 1 tablet by mouth Daily. HOLD FOR SURGERY, Disp: , Rfl:      Assessment:        Patient Active Problem List   Diagnosis   • Abnormal finding on thallium stress test   • Chest pain   • Atherosclerosis of coronary artery   • Essential hypertension   • Shortness of breath   • Unstable angina pectoris (HCC)   • Disorder of joint   • History of cardiac catheterization   • Abscess of rectum   • Lumbar  radiculopathy   • Degeneration of intervertebral disc of lumbar region   • Infected sebaceous cyst   • Cerebral hemorrhage (HCC)   • Hyperlipidemia   • Chronic diastolic heart failure (HCC)   • Depression   • Sebaceous cyst of skin of breast   • Moderate COPD (chronic obstructive pulmonary disease) (HCC)   • Chronic obstructive pulmonary disease (HCC)   • Community acquired pneumonia   • Chronic low back pain   • Abscess   • Hyperlipemia   • Acute non-ST-elevation myocardial infarction (HCC)   • Dyslipidemia   • Leg swelling   • DJD (degenerative joint disease) of knee               Plan:   1. Coronary artery disease: previous ischmeic work up as above. She is having chest pain as described above. I will repeat stress and echo.     2. Hypertension: continue current management    3. Dyslipidemia: Lipid panel 11/2021 , HDL 59, , trig 92, ast/alt wnl.     4. Chest pain: as above. Stress and echo    It was explained to the patient that stress testing carries 85% specificity/sensitivity, and does not rule out future cardiac event.  Risks of the procedure were explained to the patient including shortness of breath, induction of myocardial infarction, and dizziness.  Patient is agreeable to proceeding with stress testing.     5. Chronic diastolic CHF: no edema or rales on exam, No weight gain. Educated on weighing self daily, call for 3lbs or more overnight or 5lbs in one week. Limit salt intake              No diagnosis found.    There are no diagnoses linked to this encounter.    COUNSELING:tricia Dick was given to patient for the following topics: diagnostic results, risk factor reductions, impressions, risks and benefits of treatment options and importance of treatment compliance .       SMOKING COUNSELING:denies    Walking lexiscan and echo. She is unable to walk for extended time on treadmill. Follow up for results.     Sincerely,   BLACK Vargas  [facility]  05/06/22  10:06 EDT    EMR Dragon/Transcription disclaimer:   Much of this encounter note is an electronic transcription/translation of spoken language to printed text. The electronic translation of spoken language may permit erroneous, or at times, nonsensical words or phrases to be inadvertently transcribed; Although I have reviewed the note for such errors, some may still exist.

## 2022-05-06 ENCOUNTER — OFFICE VISIT (OUTPATIENT)
Dept: CARDIOLOGY | Facility: CLINIC | Age: 82
End: 2022-05-06

## 2022-05-06 VITALS
HEART RATE: 69 BPM | WEIGHT: 222 LBS | BODY MASS INDEX: 32.88 KG/M2 | DIASTOLIC BLOOD PRESSURE: 73 MMHG | SYSTOLIC BLOOD PRESSURE: 146 MMHG | HEIGHT: 69 IN

## 2022-05-06 DIAGNOSIS — R07.2 PRECORDIAL PAIN: ICD-10-CM

## 2022-05-06 DIAGNOSIS — I50.32 CHRONIC DIASTOLIC HEART FAILURE: ICD-10-CM

## 2022-05-06 DIAGNOSIS — E78.5 HYPERLIPIDEMIA, UNSPECIFIED HYPERLIPIDEMIA TYPE: ICD-10-CM

## 2022-05-06 DIAGNOSIS — I10 ESSENTIAL HYPERTENSION: ICD-10-CM

## 2022-05-06 DIAGNOSIS — I25.118 CORONARY ARTERY DISEASE OF NATIVE ARTERY OF NATIVE HEART WITH STABLE ANGINA PECTORIS: Primary | ICD-10-CM

## 2022-05-06 PROCEDURE — 93000 ELECTROCARDIOGRAM COMPLETE: CPT

## 2022-05-06 PROCEDURE — 99214 OFFICE O/P EST MOD 30 MIN: CPT

## 2022-06-21 ENCOUNTER — HOSPITAL ENCOUNTER (OUTPATIENT)
Dept: CARDIOLOGY | Facility: HOSPITAL | Age: 82
Discharge: HOME | End: 2022-06-21

## 2022-06-21 VITALS — WEIGHT: 222 LBS | BODY MASS INDEX: 32.88 KG/M2 | HEIGHT: 69 IN

## 2022-06-21 VITALS
HEART RATE: 59 BPM | DIASTOLIC BLOOD PRESSURE: 66 MMHG | SYSTOLIC BLOOD PRESSURE: 155 MMHG | OXYGEN SATURATION: 100 % | BODY MASS INDEX: 32.88 KG/M2 | WEIGHT: 222 LBS | HEIGHT: 69 IN

## 2022-06-21 LAB
AORTIC ARCH: 3 CM
AORTIC DIMENSIONLESS INDEX: 0.7 (DI)
ASCENDING AORTA: 2.5 CM
BH CV ECHO MEAS - ACS: 2.44 CM
BH CV ECHO MEAS - AO MAX PG: 7.2 MMHG
BH CV ECHO MEAS - AO MEAN PG: 3.7 MMHG
BH CV ECHO MEAS - AO ROOT DIAM: 3.2 CM
BH CV ECHO MEAS - AO V2 MAX: 134.4 CM/SEC
BH CV ECHO MEAS - AO V2 VTI: 31.7 CM
BH CV ECHO MEAS - AVA(I,D): 2.6 CM2
BH CV ECHO MEAS - EDV(CUBED): 77.5 ML
BH CV ECHO MEAS - EDV(MOD-SP2): 51 ML
BH CV ECHO MEAS - EDV(MOD-SP4): 47 ML
BH CV ECHO MEAS - EF(MOD-BP): 57.7 %
BH CV ECHO MEAS - EF(MOD-SP2): 54.9 %
BH CV ECHO MEAS - EF(MOD-SP4): 57.4 %
BH CV ECHO MEAS - ESV(CUBED): 24.4 ML
BH CV ECHO MEAS - ESV(MOD-SP2): 23 ML
BH CV ECHO MEAS - ESV(MOD-SP4): 20 ML
BH CV ECHO MEAS - FS: 32 %
BH CV ECHO MEAS - IVS/LVPW: 1.04 CM
BH CV ECHO MEAS - IVSD: 0.98 CM
BH CV ECHO MEAS - LAT PEAK E' VEL: 10.4 CM/SEC
BH CV ECHO MEAS - LV MASS(C)D: 133.5 GRAMS
BH CV ECHO MEAS - LV MAX PG: 3.6 MMHG
BH CV ECHO MEAS - LV MEAN PG: 1.87 MMHG
BH CV ECHO MEAS - LV V1 MAX: 95 CM/SEC
BH CV ECHO MEAS - LV V1 VTI: 22.8 CM
BH CV ECHO MEAS - LVIDD: 4.3 CM
BH CV ECHO MEAS - LVIDS: 2.9 CM
BH CV ECHO MEAS - LVOT AREA: 3.6 CM2
BH CV ECHO MEAS - LVOT DIAM: 2.14 CM
BH CV ECHO MEAS - LVPWD: 0.94 CM
BH CV ECHO MEAS - MED PEAK E' VEL: 7.2 CM/SEC
BH CV ECHO MEAS - MV A DUR: 0.12 SEC
BH CV ECHO MEAS - MV A MAX VEL: 64 CM/SEC
BH CV ECHO MEAS - MV DEC SLOPE: 314.1 CM/SEC2
BH CV ECHO MEAS - MV DEC TIME: 148 MSEC
BH CV ECHO MEAS - MV E MAX VEL: 84.4 CM/SEC
BH CV ECHO MEAS - MV E/A: 1.32
BH CV ECHO MEAS - MV MAX PG: 2.7 MMHG
BH CV ECHO MEAS - MV MEAN PG: 1.04 MMHG
BH CV ECHO MEAS - MV P1/2T: 91.1 MSEC
BH CV ECHO MEAS - MV V2 VTI: 30.2 CM
BH CV ECHO MEAS - MVA(P1/2T): 2.41 CM2
BH CV ECHO MEAS - MVA(VTI): 2.7 CM2
BH CV ECHO MEAS - PULM A REVS DUR: 0.14 SEC
BH CV ECHO MEAS - PULM A REVS VEL: 30.5 CM/SEC
BH CV ECHO MEAS - PULM DIAS VEL: 50.9 CM/SEC
BH CV ECHO MEAS - PULM S/D: 1.36
BH CV ECHO MEAS - PULM SYS VEL: 69 CM/SEC
BH CV ECHO MEAS - QP/QS: 0.93
BH CV ECHO MEAS - RV MAX PG: 1.33 MMHG
BH CV ECHO MEAS - RV V1 MAX: 57.7 CM/SEC
BH CV ECHO MEAS - RV V1 VTI: 13.7 CM
BH CV ECHO MEAS - RVOT DIAM: 2.7 CM
BH CV ECHO MEAS - SV(LVOT): 82.1 ML
BH CV ECHO MEAS - SV(MOD-SP2): 28 ML
BH CV ECHO MEAS - SV(MOD-SP4): 27 ML
BH CV ECHO MEAS - SV(RVOT): 76.3 ML
BH CV ECHO MEAS - TAPSE (>1.6): 2.11 CM
BH CV ECHO MEASUREMENTS AVERAGE E/E' RATIO: 9.59
BH CV XLRA - RV BASE: 2.7 CM
BH CV XLRA - RV LENGTH: 6.8 CM
BH CV XLRA - RV MID: 2.7 CM
BH CV XLRA - TDI S': 14.3 CM/SEC
LEFT ATRIUM VOLUME INDEX: 17.8 ML/M2
MAXIMAL PREDICTED HEART RATE: 138 BPM
SINUS: 2.9 CM
STJ: 2.6 CM
STRESS TARGET HR: 117 BPM

## 2022-06-21 PROCEDURE — 78452 HT MUSCLE IMAGE SPECT MULT: CPT | Performed by: INTERNAL MEDICINE

## 2022-06-21 PROCEDURE — 25010000002 REGADENOSON 0.4 MG/5ML SOLUTION: Performed by: INTERNAL MEDICINE

## 2022-06-21 PROCEDURE — 93306 TTE W/DOPPLER COMPLETE: CPT | Performed by: INTERNAL MEDICINE

## 2022-06-21 PROCEDURE — 93018 CV STRESS TEST I&R ONLY: CPT | Performed by: INTERNAL MEDICINE

## 2022-06-21 PROCEDURE — 78452 HT MUSCLE IMAGE SPECT MULT: CPT

## 2022-06-21 PROCEDURE — 0 TECHNETIUM SESTAMIBI: Performed by: INTERNAL MEDICINE

## 2022-06-21 PROCEDURE — 93306 TTE W/DOPPLER COMPLETE: CPT

## 2022-06-21 PROCEDURE — A9500 TC99M SESTAMIBI: HCPCS | Performed by: INTERNAL MEDICINE

## 2022-06-21 PROCEDURE — 93017 CV STRESS TEST TRACING ONLY: CPT

## 2022-06-21 RX ADMIN — TECHNETIUM TC 99M SESTAMIBI 1 DOSE: 1 INJECTION INTRAVENOUS at 11:50

## 2022-06-21 RX ADMIN — TECHNETIUM TC 99M SESTAMIBI 1 DOSE: 1 INJECTION INTRAVENOUS at 08:25

## 2022-06-21 RX ADMIN — REGADENOSON 0.4 MG: 0.08 INJECTION, SOLUTION INTRAVENOUS at 11:50

## 2022-06-23 LAB
BH CV IMMEDIATE POST TECH DATA BLOOD PRESSURE: NORMAL MMHG
BH CV IMMEDIATE POST TECH DATA HEART RATE: 69 BPM
BH CV REST NUCLEAR ISOTOPE DOSE: 10.9 MCI
BH CV STRESS BP STAGE 1: NORMAL
BH CV STRESS COMMENTS STAGE 1: NORMAL
BH CV STRESS DOSE REGADENOSON STAGE 1: 0.4
BH CV STRESS DURATION MIN STAGE 1: 1
BH CV STRESS DURATION SEC STAGE 1: 0
BH CV STRESS HR STAGE 1: 72
BH CV STRESS NUCLEAR ISOTOPE DOSE: 30.6 MCI
BH CV STRESS O2 STAGE 1: 100
BH CV STRESS PROTOCOL 1: NORMAL
BH CV STRESS RECOVERY BP: NORMAL MMHG
BH CV STRESS RECOVERY HR: 66 BPM
BH CV STRESS RECOVERY O2: 100 %
BH CV STRESS STAGE 1: 1
BH CV THREE MINUTE POST TECH DATA BLOOD PRESSURE: NORMAL MMHG
BH CV THREE MINUTE POST TECH DATA HEART RATE: 67 BPM
LV EF NUC BP: 75 %
MAXIMAL PREDICTED HEART RATE: 138 BPM
PERCENT MAX PREDICTED HR: 52.17 %
STRESS BASELINE BP: NORMAL MMHG
STRESS BASELINE HR: 58 BPM
STRESS O2 SAT REST: 100 %
STRESS PERCENT HR: 61 %
STRESS POST ESTIMATED WORKLOAD: 1 METS
STRESS POST EXERCISE DUR MIN: 1 MIN
STRESS POST EXERCISE DUR SEC: 0 SEC
STRESS POST O2 SAT PEAK: 100 %
STRESS POST PEAK BP: NORMAL MMHG
STRESS POST PEAK HR: 72 BPM
STRESS TARGET HR: 117 BPM

## 2022-07-01 ENCOUNTER — OFFICE VISIT (OUTPATIENT)
Dept: CARDIOLOGY | Facility: CLINIC | Age: 82
End: 2022-07-01

## 2022-07-01 VITALS
BODY MASS INDEX: 33.03 KG/M2 | HEART RATE: 61 BPM | DIASTOLIC BLOOD PRESSURE: 58 MMHG | SYSTOLIC BLOOD PRESSURE: 147 MMHG | WEIGHT: 223 LBS | HEIGHT: 69 IN

## 2022-07-01 DIAGNOSIS — I10 ESSENTIAL HYPERTENSION: Primary | ICD-10-CM

## 2022-07-01 DIAGNOSIS — I50.32 CHRONIC DIASTOLIC HEART FAILURE: ICD-10-CM

## 2022-07-01 DIAGNOSIS — I25.118 CORONARY ARTERY DISEASE OF NATIVE ARTERY OF NATIVE HEART WITH STABLE ANGINA PECTORIS: ICD-10-CM

## 2022-07-01 DIAGNOSIS — E78.5 HYPERLIPIDEMIA, UNSPECIFIED HYPERLIPIDEMIA TYPE: ICD-10-CM

## 2022-07-01 PROCEDURE — 99214 OFFICE O/P EST MOD 30 MIN: CPT

## 2022-07-01 NOTE — PROGRESS NOTES
" Subjective:        Cierra Kc is a 82 y.o. female who here for follow up    Chief Complaint   Patient presents with   • Results     Stress test and echo        HPI   This is An 82-year-old female with coronary artery disease, hypertension, hyperlipidemia, chronic HFpEF, chronic back pain, COPD, GERD.  She follows up in office today for test results and management of CAD.She was seen on 5/6/2022 with complaints of heaviness in her chest. Stress test 6/21/2022 myocardial perfusion imaging indicates a normal study with no evidence of ischemia.  Echo 6/21/2022 EF 57%, normal LV function.      The following portions of the patient's history were reviewed and updated as appropriate: allergies, current medications, past family history, past medical history, past social history, past surgical history and problem list.    Past Medical History:   Diagnosis Date   • Cervical disc disease    • COPD (chronic obstructive pulmonary disease) ([facility])    • Coronary artery disease    • DDD (degenerative disc disease), lumbar    • Diastolic CHF ([facility])    • GERD (gastroesophageal reflux disease)    • History of DVT of lower extremity 1970s, 1980s    x 2   • History of MRSA infection     IT STATED SHE THINKS IT WAS ON HER \"ARMS.\" STATED IT'S \"WAS A LONG TIME AGO.\"   • Hyperlipidemia    • Hypertension    • Rectal abscess    • Seizures ([facility])    • Stress incontinence    • Stroke ([facility]) 2001   • Vitamin B 12 deficiency          reports that she quit smoking about 31 years ago. She has a 10.00 pack-year smoking history. She has never used smokeless tobacco. She reports current alcohol use. She reports that she does not use drugs.     Family History   Problem Relation Age of Onset   • Hypertension Mother    • Hyperlipidemia Mother    • Hypertension Father    • COPD Father    • Stroke Brother    • Clotting disorder Brother    • Hypertension Brother    • Hyperlipidemia Brother    • Diabetes Maternal Aunt    • Diabetes Maternal Uncle    • Heart " disease Maternal Grandmother    • Stroke Maternal Grandmother    • Diabetes Maternal Grandmother    • Malig Hyperthermia Neg Hx        ROS     Review of Systems  Constitutional: no wt loss, fever, fatigue  Gastrointestinal: no nausea, abdominal pain  Behavioral/Psych: no insomnia or anxiety  Cardiovascular no chest pain or no shortness of breath      Objective:           Vitals and nursing note reviewed.   Constitutional:       Appearance: Well-developed.   HENT:      Head: Normocephalic.      Right Ear: External ear normal.      Left Ear: External ear normal.   Neck:      Vascular: No JVD.   Pulmonary:      Effort: Pulmonary effort is normal. No respiratory distress.      Breath sounds: Normal breath sounds. No stridor. No rales.   Cardiovascular:      Normal rate. Regular rhythm.      No gallop.   Pulses:     Intact distal pulses.   Abdominal:      General: Bowel sounds are normal. There is no distension.      Palpations: Abdomen is soft.      Tenderness: There is no abdominal tenderness. There is no guarding.   Musculoskeletal: Normal range of motion.         General: No tenderness.      Cervical back: Normal range of motion. Skin:     General: Skin is warm.   Neurological:      Mental Status: Alert and oriented to person, place, and time.      Deep Tendon Reflexes: Reflexes are normal and symmetric.   Psychiatric:         Judgment: Judgment normal.         Procedures    Interpretation Summary    · Calculated left ventricular EF = 57.7% Estimated left ventricular EF was in agreement with the calculated left ventricular EF.  · Left ventricular diastolic function was normal.  · There is no evidence of pericardial effusion. .    Interpretation Summary       · Findings consistent with a normal ECG stress test.  · Left ventricular ejection fraction is hyperdynamic (Calculated EF > 70%). .  · Myocardial perfusion imaging indicates a normal myocardial perfusion study with no evidence of ischemia.  · Impressions are  consistent with a low risk study.              Current Outpatient Medications:   •  acetaminophen (TYLENOL) 325 MG tablet, Take 2 tablets by mouth Every 4 (Four) Hours As Needed for Mild Pain ., Disp: 30 tablet, Rfl: 0  •  amLODIPine (NORVASC) 5 MG tablet, Take 5 mg by mouth Every Night., Disp: , Rfl:   •  atorvastatin (LIPITOR) 10 MG tablet, Take 10 mg by mouth Every Night., Disp: , Rfl:   •  divalproex (DEPAKOTE) 500 MG DR tablet, Take 500 mg by mouth Every Night., Disp: , Rfl:   •  famotidine (PEPCID) 20 MG tablet, Take 20 mg by mouth 2 (Two) Times a Day., Disp: , Rfl:   •  furosemide (LASIX) 20 MG tablet, Take 20 mg by mouth As Needed., Disp: , Rfl:   •  metoprolol succinate XL (TOPROL-XL) 50 MG 24 hr tablet, Take 50 mg by mouth Every Night., Disp: , Rfl:   •  Multiple Vitamins-Minerals (MULTI COMPLETE PO), Take 1 tablet by mouth Daily. HOLD FOR SURGERY, Disp: , Rfl:   •  nitroglycerin (NITROSTAT) 0.4 MG SL tablet, Place 0.4 mg under the tongue Every 5 (Five) Minutes As Needed for Chest Pain., Disp: , Rfl:   •  PARoxetine (PAXIL) 20 MG tablet, Take 20 mg by mouth Every Night., Disp: , Rfl:   •  saccharomyces boulardii (FLORASTOR) 250 MG capsule, Take 250 mg by mouth 2 (Two) Times a Day., Disp: , Rfl:   •  TURMERIC PO, Take 1 tablet by mouth Daily. HOLD FOR SURGERY, Disp: , Rfl:      Assessment:        Patient Active Problem List   Diagnosis   • Abnormal finding on thallium stress test   • Chest pain   • Atherosclerosis of coronary artery   • Essential hypertension   • Shortness of breath   • Unstable angina pectoris (HCC)   • Disorder of joint   • History of cardiac catheterization   • Abscess of rectum   • Lumbar radiculopathy   • Degeneration of intervertebral disc of lumbar region   • Infected sebaceous cyst   • Cerebral hemorrhage (HCC)   • Hyperlipidemia   • Chronic diastolic heart failure (HCC)   • Depression   • Sebaceous cyst of skin of breast   • Moderate COPD (chronic obstructive pulmonary disease)  (HCC)   • Chronic obstructive pulmonary disease (HCC)   • Community acquired pneumonia   • Chronic low back pain   • Abscess   • Hyperlipemia   • Acute non-ST-elevation myocardial infarction (HCC)   • Dyslipidemia   • Leg swelling   • DJD (degenerative joint disease) of knee               Plan:   1.  Coronary artery disease: ischemic work up as above. No chest pain.  Continue statin, beta-blockade.    Risk reduction for the coronary artery disease, controlling the blood pressure, blood sugar management, cholesterol management, exercise, stress management, and proper compliance with medications and follow-up has been discussed    Stress testing carries 85% specificity/sensitivity/cardiac workup has been explained, and does not rule out coronary artery disease or future events, continue to emphasize on risk reductions for coronary artery disease.  Explained if symptoms continue please go to ER, and further w/p will be required.    2.  Hypertension: Controlled on current regimen.  Continue same.    Importance of controlling hypertension and blood pressure  checkup on the regular basis has been explained. Hypertension as a silent killer has been discussed. Risk reduction of the weight and regular exercises to control the hypertension has been explained.    3.  Dyslipidemia: She reports her PCP manages her cholesterol labs.  Continue current management.    Risk of the hyperlipidemia, importance of the treatment has been explained. Pros and cons of the statins has been explained. Regular blood workup as well as side effects including the liver failure, myelopathy death has been explained.    4.  Chronic diastolic CHF: No new or worsening shortness of breath or edema.  No weight gain.  Educated to monitor weight daily, limit sodium and water intake.             No diagnosis found.    There are no diagnoses linked to this encounter.    COUNSELING: Lebron Dick was given to patient for the following  topics: diagnostic results, risk factor reductions, impressions, risks and benefits of treatment options and importance of treatment compliance .       SMOKING COUNSELING: Denies    Follow up in 1 year or sooner if needed    Sincerely,   BLACK Vargas  [facility]  07/01/22  10:07 EDT    EMR Dragon/Transcription disclaimer:   Much of this encounter note is an electronic transcription/translation of spoken language to printed text. The electronic translation of spoken language may permit erroneous, or at times, nonsensical words or phrases to be inadvertently transcribed; Although I have reviewed the note for such errors, some may still exist.

## 2022-07-12 ENCOUNTER — TELEPHONE (OUTPATIENT)
Dept: FAMILY MEDICINE CLINIC | Facility: CLINIC | Age: 82
End: 2022-07-12

## 2022-08-01 ENCOUNTER — TELEPHONE (OUTPATIENT)
Dept: NEUROLOGY | Facility: OTHER | Age: 82
End: 2022-08-01

## 2022-08-01 NOTE — TELEPHONE ENCOUNTER
PT CALLED TO GET APPT WITH DR GIRALDO. PT HASN'T SEEN NEUROLOGY IN THE PAST. PT'S MOTHER SEES DR GIRALDO. PT FEEL AND HIT HER FOREHEAD. PT HAS SINCE STARTING FEELING A SHARP STICK PAIN AND IS NOT HAVING HEADACHES.    TOLD PT WE NEED REFERRAL.    PT WAS SEEN AT [facility] ON 5-21-22.    GAVE HER HUB FAX NUMBER TO GET REFERRAL SENT.    PT NEEDS Friday APPTS.    I CALLED [facility] TO GET MEDICAL RECORDS [phone]. SPOKE W/KYRA AND REQUESTED MED RECORDS. SHE WILL SEND ABOUT 14 PGS TODAY.

## 2022-08-02 NOTE — TELEPHONE ENCOUNTER
ADVISED PT IT COULD TAKE UP TO 72 HRS TO RECEIVE VIA FAX.     I WILL CALL TO CONFIRM IF REFERRAL IS BEING FAXED OVER     [phone] -     CALLED - [phone] S/W ANGELICA FROM OFFICE - SHE STATES A REFERRAL HAS BEEN OKAY' D BUT NO ORDER PLACED YET. I DID LEAVE A MESSAGE WITH PRACTICE INFO

## 2022-11-17 ENCOUNTER — OFFICE VISIT (OUTPATIENT)
Dept: NEUROLOGY | Facility: CLINIC | Age: 82
End: 2022-11-17

## 2022-11-17 VITALS
BODY MASS INDEX: 32.98 KG/M2 | HEART RATE: 60 BPM | WEIGHT: 222.66 LBS | HEIGHT: 69 IN | DIASTOLIC BLOOD PRESSURE: 62 MMHG | RESPIRATION RATE: 16 BRPM | SYSTOLIC BLOOD PRESSURE: 138 MMHG

## 2022-11-17 DIAGNOSIS — M54.50 CHRONIC LOW BACK PAIN, UNSPECIFIED BACK PAIN LATERALITY, UNSPECIFIED WHETHER SCIATICA PRESENT: ICD-10-CM

## 2022-11-17 DIAGNOSIS — Z91.81 AT RISK FOR FALLS: Primary | ICD-10-CM

## 2022-11-17 DIAGNOSIS — G89.29 CHRONIC LOW BACK PAIN, UNSPECIFIED BACK PAIN LATERALITY, UNSPECIFIED WHETHER SCIATICA PRESENT: ICD-10-CM

## 2022-11-17 DIAGNOSIS — R20.0 NUMBNESS OF FINGERS OF BOTH HANDS: ICD-10-CM

## 2022-11-17 DIAGNOSIS — I69.30 SEQUELAE, POST-STROKE: ICD-10-CM

## 2022-11-17 DIAGNOSIS — G56.03 BILATERAL CARPAL TUNNEL SYNDROME: ICD-10-CM

## 2022-11-17 DIAGNOSIS — R20.0 NUMBNESS OF LEGS: ICD-10-CM

## 2022-11-17 PROCEDURE — 99205 OFFICE O/P NEW HI 60 MIN: CPT | Performed by: STUDENT IN AN ORGANIZED HEALTH CARE EDUCATION/TRAINING PROGRAM

## 2022-11-17 RX ORDER — ASPIRIN 81 MG/1
81 TABLET ORAL DAILY
COMMUNITY

## 2022-11-21 ENCOUNTER — TELEPHONE (OUTPATIENT)
Dept: NEUROLOGY | Facility: CLINIC | Age: 82
End: 2022-11-21

## 2022-11-21 NOTE — TELEPHONE ENCOUNTER
Patient was called and she was advised that yes she needs an MRI and please schedule this [phone], erica Vargas [facility]

## 2022-11-21 NOTE — TELEPHONE ENCOUNTER
Caller: KcCierra patel    Relationship: Self    Best call back number: [phone]    What is the best time to reach you: ANY    Who are you requesting to speak with (clinical staff, provider,  specific staff member): VIDAL LAND      What was the call regarding: PATIENT TELEPHONED RE: ORDER FOR MRI OF THE SPINE. SHE STATES SHE DID NOT REALIZE DR GIRALDO ORDERED THIS MRI. SHE TOLD [facility] SCHEDULING IT WAS A MISTAKE.     PLEASE CALL PATIENT TO CLARIFY IF THIS MRI IS NEEDED. SHE ALSO STATES IF IT IS NEEDED WE WOULD NEED TO REACH OUT TO [facility] SCHEDULING TO VERIFY THIS IS NEEDED AS SHE TOLD THEM IT WAS A MISTAKE.

## 2022-11-22 NOTE — TELEPHONE ENCOUNTER
Provider: KRISTAL  Caller: PATIENT  Relationship to Patient: SELF  Phone Number: [phone]  Reason for Call: PATIENT CALLED BECAUSE SHE SAW OUR # ON CALLER ID AND WANTED TO SEE IF IT WAS REGARDING THE MSG SHE LEFT YESTERDAY REGARDING CLARIFICATION FOR THE MRI.  PATIENT IS WANTING TO KNOW IF THE MRI IS FOR THE WHOLE BACK OR JUST NECK. OR BOTH. OKAY TO LVM.     PLEASE REVIEW AND ADVISE     THANK YOU

## 2022-11-30 ENCOUNTER — HOSPITAL ENCOUNTER (OUTPATIENT)
Dept: PHYSICAL THERAPY | Facility: HOSPITAL | Age: 82
Setting detail: THERAPIES SERIES
Discharge: HOME | End: 2022-11-30

## 2022-11-30 DIAGNOSIS — Z86.73 HISTORY OF CVA (CEREBROVASCULAR ACCIDENT): ICD-10-CM

## 2022-11-30 DIAGNOSIS — R29.6 FREQUENT FALLS: ICD-10-CM

## 2022-11-30 DIAGNOSIS — R26.89 IMPAIRMENT OF BALANCE: Primary | ICD-10-CM

## 2022-11-30 DIAGNOSIS — M54.50 CHRONIC BILATERAL LOW BACK PAIN, UNSPECIFIED WHETHER SCIATICA PRESENT: ICD-10-CM

## 2022-11-30 DIAGNOSIS — Z96.651 HISTORY OF TOTAL KNEE REPLACEMENT, RIGHT: ICD-10-CM

## 2022-11-30 DIAGNOSIS — G89.29 CHRONIC BILATERAL LOW BACK PAIN, UNSPECIFIED WHETHER SCIATICA PRESENT: ICD-10-CM

## 2022-11-30 PROCEDURE — 97161 PT EVAL LOW COMPLEX 20 MIN: CPT

## 2022-12-02 ENCOUNTER — HOSPITAL ENCOUNTER (OUTPATIENT)
Dept: CARDIOLOGY | Facility: HOSPITAL | Age: 82
Discharge: HOME | End: 2022-12-02
Admitting: STUDENT IN AN ORGANIZED HEALTH CARE EDUCATION/TRAINING PROGRAM

## 2022-12-02 DIAGNOSIS — I69.30 SEQUELAE, POST-STROKE: ICD-10-CM

## 2022-12-02 PROCEDURE — 93246 EXT ECG>7D<15D RECORDING: CPT

## 2022-12-07 ENCOUNTER — HOSPITAL ENCOUNTER (OUTPATIENT)
Dept: PHYSICAL THERAPY | Facility: HOSPITAL | Age: 82
Setting detail: THERAPIES SERIES
Discharge: HOME | End: 2022-12-07
Payer: MEDICARE

## 2022-12-07 DIAGNOSIS — Z96.651 HISTORY OF TOTAL KNEE REPLACEMENT, RIGHT: ICD-10-CM

## 2022-12-07 DIAGNOSIS — R29.6 FREQUENT FALLS: ICD-10-CM

## 2022-12-07 DIAGNOSIS — G89.29 CHRONIC BILATERAL LOW BACK PAIN, UNSPECIFIED WHETHER SCIATICA PRESENT: ICD-10-CM

## 2022-12-07 DIAGNOSIS — R26.89 IMPAIRMENT OF BALANCE: Primary | ICD-10-CM

## 2022-12-07 DIAGNOSIS — M54.50 CHRONIC BILATERAL LOW BACK PAIN, UNSPECIFIED WHETHER SCIATICA PRESENT: ICD-10-CM

## 2022-12-07 DIAGNOSIS — Z86.73 HISTORY OF CVA (CEREBROVASCULAR ACCIDENT): ICD-10-CM

## 2022-12-07 PROCEDURE — 97112 NEUROMUSCULAR REEDUCATION: CPT

## 2022-12-07 PROCEDURE — 97110 THERAPEUTIC EXERCISES: CPT
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
## 2022-12-09 ENCOUNTER — HOSPITAL ENCOUNTER (OUTPATIENT)
Dept: PHYSICAL THERAPY | Facility: HOSPITAL | Age: 82
Setting detail: THERAPIES SERIES
Discharge: HOME | End: 2022-12-09
Payer: MEDICARE

## 2022-12-09 DIAGNOSIS — Z86.73 HISTORY OF CVA (CEREBROVASCULAR ACCIDENT): ICD-10-CM

## 2022-12-09 DIAGNOSIS — R26.89 IMPAIRMENT OF BALANCE: Primary | ICD-10-CM

## 2022-12-09 DIAGNOSIS — R29.6 FREQUENT FALLS: ICD-10-CM

## 2022-12-09 DIAGNOSIS — G89.29 CHRONIC BILATERAL LOW BACK PAIN, UNSPECIFIED WHETHER SCIATICA PRESENT: ICD-10-CM

## 2022-12-09 DIAGNOSIS — Z96.651 HISTORY OF TOTAL KNEE REPLACEMENT, RIGHT: ICD-10-CM

## 2022-12-09 DIAGNOSIS — M54.50 CHRONIC BILATERAL LOW BACK PAIN, UNSPECIFIED WHETHER SCIATICA PRESENT: ICD-10-CM

## 2022-12-09 PROCEDURE — 97112 NEUROMUSCULAR REEDUCATION: CPT

## 2022-12-09 PROCEDURE — 97110 THERAPEUTIC EXERCISES: CPT
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
## 2022-12-19 LAB
MAXIMAL PREDICTED HEART RATE: 138 BPM
STRESS TARGET HR: 117 BPM

## 2022-12-19 PROCEDURE — 93248 EXT ECG>7D<15D REV&INTERPJ: CPT | Performed by: INTERNAL MEDICINE

## 2022-12-23 ENCOUNTER — APPOINTMENT (OUTPATIENT)
Dept: PHYSICAL THERAPY | Facility: HOSPITAL | Age: 82
End: 2022-12-23
Payer: MEDICARE

## 2022-12-28 ENCOUNTER — HOSPITAL ENCOUNTER (OUTPATIENT)
Dept: PHYSICAL THERAPY | Facility: HOSPITAL | Age: 82
Setting detail: THERAPIES SERIES
Discharge: HOME | End: 2022-12-28
Payer: MEDICARE

## 2022-12-28 DIAGNOSIS — G89.29 CHRONIC BILATERAL LOW BACK PAIN, UNSPECIFIED WHETHER SCIATICA PRESENT: ICD-10-CM

## 2022-12-28 DIAGNOSIS — Z86.73 HISTORY OF CVA (CEREBROVASCULAR ACCIDENT): ICD-10-CM

## 2022-12-28 DIAGNOSIS — R29.6 FREQUENT FALLS: ICD-10-CM

## 2022-12-28 DIAGNOSIS — M54.50 CHRONIC BILATERAL LOW BACK PAIN, UNSPECIFIED WHETHER SCIATICA PRESENT: ICD-10-CM

## 2022-12-28 DIAGNOSIS — R26.89 IMPAIRMENT OF BALANCE: Primary | ICD-10-CM

## 2022-12-28 DIAGNOSIS — Z96.651 HISTORY OF TOTAL KNEE REPLACEMENT, RIGHT: ICD-10-CM

## 2022-12-28 PROCEDURE — 97110 THERAPEUTIC EXERCISES: CPT

## 2022-12-28 PROCEDURE — 97112 NEUROMUSCULAR REEDUCATION: CPT
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
## 2022-12-30 ENCOUNTER — HOSPITAL ENCOUNTER (OUTPATIENT)
Dept: PHYSICAL THERAPY | Facility: HOSPITAL | Age: 82
Setting detail: THERAPIES SERIES
Discharge: HOME | End: 2022-12-30
Payer: MEDICARE

## 2022-12-30 DIAGNOSIS — G89.29 CHRONIC BILATERAL LOW BACK PAIN, UNSPECIFIED WHETHER SCIATICA PRESENT: ICD-10-CM

## 2022-12-30 DIAGNOSIS — R26.89 IMPAIRMENT OF BALANCE: Primary | ICD-10-CM

## 2022-12-30 DIAGNOSIS — R29.6 FREQUENT FALLS: ICD-10-CM

## 2022-12-30 DIAGNOSIS — M54.50 CHRONIC BILATERAL LOW BACK PAIN, UNSPECIFIED WHETHER SCIATICA PRESENT: ICD-10-CM

## 2022-12-30 PROCEDURE — 97110 THERAPEUTIC EXERCISES: CPT

## 2022-12-30 PROCEDURE — 97530 THERAPEUTIC ACTIVITIES: CPT

## 2022-12-30 PROCEDURE — 97112 NEUROMUSCULAR REEDUCATION: CPT
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
## 2023-01-03 ENCOUNTER — HOSPITAL ENCOUNTER (OUTPATIENT)
Dept: PHYSICAL THERAPY | Facility: HOSPITAL | Age: 83
Setting detail: THERAPIES SERIES
Discharge: HOME | End: 2023-01-03
Payer: MEDICARE

## 2023-01-03 DIAGNOSIS — R26.89 IMPAIRMENT OF BALANCE: Primary | ICD-10-CM

## 2023-01-03 DIAGNOSIS — G89.29 CHRONIC BILATERAL LOW BACK PAIN, UNSPECIFIED WHETHER SCIATICA PRESENT: ICD-10-CM

## 2023-01-03 DIAGNOSIS — M54.50 CHRONIC BILATERAL LOW BACK PAIN, UNSPECIFIED WHETHER SCIATICA PRESENT: ICD-10-CM

## 2023-01-03 DIAGNOSIS — R29.6 FREQUENT FALLS: ICD-10-CM

## 2023-01-03 PROCEDURE — 97530 THERAPEUTIC ACTIVITIES: CPT

## 2023-01-03 PROCEDURE — 97112 NEUROMUSCULAR REEDUCATION: CPT

## 2023-01-03 PROCEDURE — 97110 THERAPEUTIC EXERCISES: CPT
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
## 2023-01-04 ENCOUNTER — TELEPHONE (OUTPATIENT)
Dept: NEUROLOGY | Facility: CLINIC | Age: 83
End: 2023-01-04

## 2023-01-04 NOTE — TELEPHONE ENCOUNTER
Provider: KRISTAL  Caller: JAMIE  Relationship to Patient: SELF  Pharmacy: N/A  Phone Number: [phone]  Reason for Call:PT CALLED STATING SHE HAS A MRI COMING UP ON 01/14/2023 AT 9:30AM AND PT WANTS TO KNOW IF THERE IS ANY MEDICATION PROVIDER CAN CALL IN TO HELP WITH HER ANEXITY THE MORING OF THE MRI.    PLEASE REVIEW AND ADVISE  THANK YOU

## 2023-01-05 ENCOUNTER — HOSPITAL ENCOUNTER (OUTPATIENT)
Dept: PHYSICAL THERAPY | Facility: HOSPITAL | Age: 83
Setting detail: THERAPIES SERIES
Discharge: HOME | End: 2023-01-05
Payer: MEDICARE

## 2023-01-05 DIAGNOSIS — R26.89 IMPAIRMENT OF BALANCE: Primary | ICD-10-CM

## 2023-01-05 DIAGNOSIS — Z86.73 HISTORY OF CVA (CEREBROVASCULAR ACCIDENT): ICD-10-CM

## 2023-01-05 DIAGNOSIS — Z96.651 HISTORY OF TOTAL KNEE REPLACEMENT, RIGHT: ICD-10-CM

## 2023-01-05 DIAGNOSIS — R29.6 FREQUENT FALLS: ICD-10-CM

## 2023-01-05 DIAGNOSIS — G89.29 CHRONIC BILATERAL LOW BACK PAIN, UNSPECIFIED WHETHER SCIATICA PRESENT: ICD-10-CM

## 2023-01-05 DIAGNOSIS — F40.240 CLAUSTROPHOBIA: Primary | ICD-10-CM

## 2023-01-05 DIAGNOSIS — M54.50 CHRONIC BILATERAL LOW BACK PAIN, UNSPECIFIED WHETHER SCIATICA PRESENT: ICD-10-CM

## 2023-01-05 PROCEDURE — 97112 NEUROMUSCULAR REEDUCATION: CPT

## 2023-01-05 PROCEDURE — 97110 THERAPEUTIC EXERCISES: CPT

## 2023-01-05 RX ORDER — LORAZEPAM 0.5 MG/1
0.5 TABLET ORAL ONCE AS NEEDED
Qty: 1 TABLET | Refills: 0 | Status: SHIPPED | OUTPATIENT
Start: 2023-01-05
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
## 2023-01-14 ENCOUNTER — HOSPITAL ENCOUNTER (OUTPATIENT)
Dept: CT IMAGING | Facility: HOSPITAL | Age: 83
Discharge: HOME | End: 2023-01-14
Payer: MEDICARE

## 2023-01-14 ENCOUNTER — HOSPITAL ENCOUNTER (OUTPATIENT)
Dept: MRI IMAGING | Facility: HOSPITAL | Age: 83
Discharge: HOME | End: 2023-01-14
Payer: MEDICARE

## 2023-01-14 DIAGNOSIS — G89.29 CHRONIC LOW BACK PAIN, UNSPECIFIED BACK PAIN LATERALITY, UNSPECIFIED WHETHER SCIATICA PRESENT: ICD-10-CM

## 2023-01-14 DIAGNOSIS — I69.30 SEQUELAE, POST-STROKE: ICD-10-CM

## 2023-01-14 DIAGNOSIS — M54.50 CHRONIC LOW BACK PAIN, UNSPECIFIED BACK PAIN LATERALITY, UNSPECIFIED WHETHER SCIATICA PRESENT: ICD-10-CM

## 2023-01-14 DIAGNOSIS — R20.0 NUMBNESS OF LEGS: ICD-10-CM

## 2023-01-14 DIAGNOSIS — Z91.81 AT RISK FOR FALLS: ICD-10-CM

## 2023-01-14 PROCEDURE — 70496 CT ANGIOGRAPHY HEAD: CPT

## 2023-01-14 PROCEDURE — 72158 MRI LUMBAR SPINE W/O & W/DYE: CPT

## 2023-01-14 PROCEDURE — 70551 MRI BRAIN STEM W/O DYE: CPT

## 2023-01-14 PROCEDURE — 82565 ASSAY OF CREATININE: CPT

## 2023-01-14 PROCEDURE — A9577 INJ MULTIHANCE: HCPCS | Performed by: STUDENT IN AN ORGANIZED HEALTH CARE EDUCATION/TRAINING PROGRAM

## 2023-01-14 PROCEDURE — 0 IOPAMIDOL PER 1 ML: Performed by: STUDENT IN AN ORGANIZED HEALTH CARE EDUCATION/TRAINING PROGRAM

## 2023-01-14 PROCEDURE — 0 GADOBENATE DIMEGLUMINE 529 MG/ML SOLUTION: Performed by: STUDENT IN AN ORGANIZED HEALTH CARE EDUCATION/TRAINING PROGRAM

## 2023-01-14 PROCEDURE — 70498 CT ANGIOGRAPHY NECK: CPT

## 2023-01-14 RX ADMIN — IOPAMIDOL 95 ML: 755 INJECTION, SOLUTION INTRAVENOUS at 10:54

## 2023-01-14 RX ADMIN — GADOBENATE DIMEGLUMINE 20 ML: 529 INJECTION, SOLUTION INTRAVENOUS at 11:59

## 2023-01-16 LAB — CREAT BLDA-MCNC: 0.9 MG/DL (ref 0.6–1.3)

## 2023-01-18 ENCOUNTER — TELEPHONE (OUTPATIENT)
Dept: NEUROLOGY | Facility: CLINIC | Age: 83
End: 2023-01-18
Payer: MEDICARE

## 2023-01-18 NOTE — TELEPHONE ENCOUNTER
Caller: Cierra Kc    Relationship: Self    Best call back number: Mobile phone not available    What was the call regarding: PATIENT HAS EMG SCHEDULED ON 4/24 BUT HER APPT WITH DR. GIRALDO IS SCHEDULED FOR 4/6/23. DOES SHE NEED TO RESCHEDULE THAT APPT UNTIL AFTER THE EMG OR LEAVE IT WHERE IT IS. IFPATIENT MUST BE RESCHEDULED PLEASE TRY TO SCHEDULE ON A Friday.    ALSO, PATIENT WANTS DR. GIRALDO TO KNOW THAT SHE HAS STOPPED PHYSICAL THERAPY AT [facility] BUT HAS STARTED AN EXERCISE CLASS THAT IS NOT AS HARD ON HER KNEE.     Do you require a callback: YES    PLEASE REVIEW AND ADVISE

## 2023-04-21 ENCOUNTER — TELEPHONE (OUTPATIENT)
Dept: NEUROLOGY | Facility: CLINIC | Age: 83
End: 2023-04-21
Payer: MEDICARE

## 2023-04-24 ENCOUNTER — APPOINTMENT (OUTPATIENT)
Dept: INFUSION THERAPY | Facility: HOSPITAL | Age: 83
End: 2023-04-24

## 2023-04-24 ENCOUNTER — HOSPITAL ENCOUNTER (OUTPATIENT)
Dept: INFUSION THERAPY | Facility: HOSPITAL | Age: 83
Discharge: HOME | End: 2023-04-24
Admitting: PSYCHIATRY & NEUROLOGY
Payer: MEDICARE

## 2023-04-24 DIAGNOSIS — R20.0 NUMBNESS OF LEGS: ICD-10-CM

## 2023-04-24 DIAGNOSIS — G89.29 CHRONIC LOW BACK PAIN, UNSPECIFIED BACK PAIN LATERALITY, UNSPECIFIED WHETHER SCIATICA PRESENT: ICD-10-CM

## 2023-04-24 DIAGNOSIS — R20.0 NUMBNESS OF FINGERS OF BOTH HANDS: ICD-10-CM

## 2023-04-24 DIAGNOSIS — M54.50 CHRONIC LOW BACK PAIN, UNSPECIFIED BACK PAIN LATERALITY, UNSPECIFIED WHETHER SCIATICA PRESENT: ICD-10-CM

## 2023-04-24 PROCEDURE — 95886 MUSC TEST DONE W/N TEST COMP: CPT | Performed by: PSYCHIATRY & NEUROLOGY

## 2023-04-24 PROCEDURE — 95886 MUSC TEST DONE W/N TEST COMP: CPT

## 2023-04-24 PROCEDURE — 95913 NRV CNDJ TEST 13/> STUDIES: CPT

## 2023-04-24 PROCEDURE — 95913 NRV CNDJ TEST 13/> STUDIES: CPT | Performed by: PSYCHIATRY & NEUROLOGY

## 2023-04-24 NOTE — PROCEDURES
EMG and Nerve Conduction Studies    I.      Instrument used: Neuromax 1002  II.     Please see data sheets for tabular summary of NCS and details on methods, temperatures and lab standards.   III.    EMG muscles tested for upper extremity studies include the deltoid, biceps, triceps, pronator teres, extensor digitorum communis, first dorsal interosseous and abductor pollicis brevis.    IV.   EMG muscles tested for lower extremity studies include the vastus lateralis, tibialis anterior, peroneus longus, medial gastrocnemius and extensor digitorum brevis.    V.    Additional muscles tested as needed.  Paraspinal muscles tested as needed.   VI.   Please see data sheets for tabular summary of EMG findings.   VII. The complete report includes the data sheets.      Indication: Numbness in the hands and feet  History: 82-year-old white female with numbness in the hands and feet.  Symptoms have been present at least a couple of years.  She has had low back pain with radicular pains but not recently.  She has had epidurals on her back previously.  She states that the numbness in her feet affects her balance.  She denies diabetes or thyroid disease.  A hemoglobin A1c approximately 1 year ago was minimally elevated at 5.7%.    Request made for EMG of all 4 extremities      Ht: 175.3 cm  Wt: 101 kg  HbA1C:   Lab Results   Component Value Date    HGBA1C 5.7 (H) 05/27/2022     TSH:   Lab Results   Component Value Date    TSH 1.270 05/27/2022       Technical summary:  Nerve conduction studies were obtained in both arms and in the right leg.  Skin temperatures were initially cold and she was difficult to keep warm.  Temperatures were mostly at least 32 °C at the palm or at the ankle at the time the study was done.  Needle examination was obtained on selected muscles of all 4 extremities.    Results:  1.  Severely prolonged left median antidromic sensory distal latency at 6.6 ms with very low amplitude of 6.3 µV (versus motor  artifact).  Absent right median antidromic sensory potential.  2.  Prolonged ulnar antidromic sensory distal latencies bilaterally at 4.0 ms on the left and 3.8 ms on the right with low amplitudes bilaterally at 12 µV on the left and 10.7 µV on the right.  3.  Normal right radial sensory distal latency and amplitude.  4.  Severely prolonged left median motor distal latency at 9.8 ms with a slow velocity of 37.7 m/s.  The amplitude was very low at 0.700 mV from wrist stimulation.  Severely prolonged right median motor distal latency at 6.6 ms with slow velocity of 41.9 m/s.  The amplitude was borderline at 4.5 mV.  The amplitude was somewhat higher from the proximal stimulation site and a slight dip was seen but a crossover test looking for a Param-Marina connection was not definitive for a crossover.  5.  Slow ulnar motor conduction velocities in the short segments across the elbows at 43.5 m/s on the left and 46.2 m/s on the right with normal conduction velocities below the elbows.  Normal distal latencies and amplitudes.  6.  Prolonged right sural sensory distal latency at 4.6 ms with very low amplitude of 1.7 µV.  7.  Prolonged right superficial peroneal sensory distal latency at 4.8 ms with very low amplitude of 1.3 µV.  8.  Slow right peroneal motor velocities below the knee at 33.1 m/s and in the short segment across the fibular head at 37.5 m/s.  Normal distal latency with low amplitude of 0.650 mV from ankle stimulation.  9.  Slow right tibial motor velocity at 30.4 m/s with a prolonged distal latency of 9.2 ms.  The amplitude was very low at 0.383 mV from ankle stimulation.  10.  Needle examination of selected muscles in all 4 extremities showed multiple abnormalities.  There were positive sharp waves and fibrillations in the left abductor pollicis brevis.  There was a moderate increased number of large motor units increased firing rates and reduced interference patterns in that muscle.  All other muscles  tested in the upper extremity showed normal insertional activities.  There was an increased number of large motor units in the right abductor pollicis brevis, both first dorsal interosseous muscles and in the extensor digitorum muscles bilaterally with increased firing rates and reduced interference patterns.  Remaining muscles tested in the arms showed normal motor units and recruitment patterns.  Cervical paraspinals at C7 showed a few positive sharp waves on the left but not on the right.    In the lower extremities there were positive sharp waves in the medial gastrocnemius and extensor digitorum brevis muscles bilaterally.  There was an increased number of large motor units in these muscles bilaterally with increased firing rates and reduced interference patterns quite severely so in the extensor digitorum brevis muscles.  The remaining muscles tested showed normal insertional activities.  There was an increased number of large motor units in the right tibialis anterior and peroneus longus muscles with increased firing rates and reduced interference patterns.  The remaining muscles tested showed normal motor units and recruitment patterns.  Lumbar paraspinals at L5 showed positive sharp waves on the right.      Impression:  Complex abnormal study.  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  The median nerves are severely affected on both sides more so on the left.  The ulnar nerves are affected to a moderate degree at the elbows.  In addition there were needle exam changes in typical muscles for the degree of neuropathy demonstrated by nerve conduction study but some paraspinal abnormalities were seen on the left at C7 and on the right at L5.  Clinical correlation is suggested for root level involvement from peripheral neuropathy versus superimposed cervical and lumbosacral radiculopathies.  Study results were discussed with the patient.    Patrick Bonilla M.D.              Dictated utilizing Dragon  dictation.

## 2023-04-28 ENCOUNTER — OFFICE VISIT (OUTPATIENT)
Dept: NEUROLOGY | Facility: CLINIC | Age: 83
End: 2023-04-28
Payer: MEDICARE

## 2023-04-28 ENCOUNTER — LAB (OUTPATIENT)
Dept: LAB | Facility: HOSPITAL | Age: 83
End: 2023-04-28
Payer: MEDICARE

## 2023-04-28 VITALS
OXYGEN SATURATION: 97 % | HEART RATE: 86 BPM | DIASTOLIC BLOOD PRESSURE: 60 MMHG | SYSTOLIC BLOOD PRESSURE: 140 MMHG | WEIGHT: 211.2 LBS | HEIGHT: 69 IN | BODY MASS INDEX: 31.28 KG/M2

## 2023-04-28 DIAGNOSIS — Z91.81 AT RISK FOR FALLS: ICD-10-CM

## 2023-04-28 DIAGNOSIS — I72.9 PSEUDOANEURYSM: ICD-10-CM

## 2023-04-28 DIAGNOSIS — R20.0 NUMBNESS OF LEGS: ICD-10-CM

## 2023-04-28 DIAGNOSIS — G89.29 CHRONIC LOW BACK PAIN, UNSPECIFIED BACK PAIN LATERALITY, UNSPECIFIED WHETHER SCIATICA PRESENT: ICD-10-CM

## 2023-04-28 DIAGNOSIS — G62.9 NEUROPATHY: Primary | ICD-10-CM

## 2023-04-28 DIAGNOSIS — R20.0 NUMBNESS OF FINGERS OF BOTH HANDS: ICD-10-CM

## 2023-04-28 DIAGNOSIS — G62.9 NEUROPATHY: ICD-10-CM

## 2023-04-28 DIAGNOSIS — I77.71 DISSECTION OF CAROTID ARTERY: ICD-10-CM

## 2023-04-28 DIAGNOSIS — M54.50 CHRONIC LOW BACK PAIN, UNSPECIFIED BACK PAIN LATERALITY, UNSPECIFIED WHETHER SCIATICA PRESENT: ICD-10-CM

## 2023-04-28 LAB
CHROMATIN AB SERPL-ACNC: <10 IU/ML (ref 0–14)
FOLATE SERPL-MCNC: 9.22 NG/ML (ref 4.78–24.2)

## 2023-04-28 PROCEDURE — 86334 IMMUNOFIX E-PHORESIS SERUM: CPT

## 2023-04-28 PROCEDURE — 82746 ASSAY OF FOLIC ACID SERUM: CPT | Performed by: STUDENT IN AN ORGANIZED HEALTH CARE EDUCATION/TRAINING PROGRAM

## 2023-04-28 PROCEDURE — 86235 NUCLEAR ANTIGEN ANTIBODY: CPT

## 2023-04-28 PROCEDURE — 86037 ANCA TITER EACH ANTIBODY: CPT | Performed by: STUDENT IN AN ORGANIZED HEALTH CARE EDUCATION/TRAINING PROGRAM

## 2023-04-28 PROCEDURE — 84425 ASSAY OF VITAMIN B-1: CPT | Performed by: STUDENT IN AN ORGANIZED HEALTH CARE EDUCATION/TRAINING PROGRAM

## 2023-04-28 PROCEDURE — 86431 RHEUMATOID FACTOR QUANT: CPT | Performed by: STUDENT IN AN ORGANIZED HEALTH CARE EDUCATION/TRAINING PROGRAM

## 2023-04-28 PROCEDURE — 84446 ASSAY OF VITAMIN E: CPT | Performed by: STUDENT IN AN ORGANIZED HEALTH CARE EDUCATION/TRAINING PROGRAM

## 2023-04-28 PROCEDURE — 83516 IMMUNOASSAY NONANTIBODY: CPT | Performed by: STUDENT IN AN ORGANIZED HEALTH CARE EDUCATION/TRAINING PROGRAM

## 2023-04-28 PROCEDURE — 84155 ASSAY OF PROTEIN SERUM: CPT

## 2023-04-28 PROCEDURE — 82595 ASSAY OF CRYOGLOBULIN: CPT | Performed by: STUDENT IN AN ORGANIZED HEALTH CARE EDUCATION/TRAINING PROGRAM

## 2023-04-28 PROCEDURE — 86038 ANTINUCLEAR ANTIBODIES: CPT | Performed by: STUDENT IN AN ORGANIZED HEALTH CARE EDUCATION/TRAINING PROGRAM

## 2023-04-28 PROCEDURE — 84165 PROTEIN E-PHORESIS SERUM: CPT

## 2023-04-28 PROCEDURE — 82525 ASSAY OF COPPER: CPT | Performed by: STUDENT IN AN ORGANIZED HEALTH CARE EDUCATION/TRAINING PROGRAM

## 2023-04-28 PROCEDURE — 84630 ASSAY OF ZINC: CPT

## 2023-04-28 PROCEDURE — 84207 ASSAY OF VITAMIN B-6: CPT | Performed by: STUDENT IN AN ORGANIZED HEALTH CARE EDUCATION/TRAINING PROGRAM

## 2023-04-28 PROCEDURE — 82784 ASSAY IGA/IGD/IGG/IGM EACH: CPT

## 2023-04-28 PROCEDURE — 36415 COLL VENOUS BLD VENIPUNCTURE: CPT
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
## 2023-04-28 NOTE — LETTER
April 28, 2023       No Recipients    Patient: Cierra Kc   YOB: 1940   Date of Visit: 4/28/2023       Dear Dr. Shafer Recipients:    Thank you for referring Cierra Kc to me for evaluation. Below are the relevant portions of my assessment and plan of care.    If you have questions, please do not hesitate to call me. I look forward to following Cierra along with you.         Sincerely,        Berlin Wu MD        CC:   No Recipients

## 2023-04-30 LAB — COPPER SERPL-MCNC: 145 UG/DL (ref 80–158)

## 2023-05-01 LAB
ALBUMIN SERPL ELPH-MCNC: 3.4 G/DL (ref 2.9–4.4)
ALBUMIN/GLOB SERPL: 1.4 {RATIO} (ref 0.7–1.7)
ALPHA1 GLOB SERPL ELPH-MCNC: 0.2 G/DL (ref 0–0.4)
ALPHA2 GLOB SERPL ELPH-MCNC: 0.6 G/DL (ref 0.4–1)
ANA SER QL: NEGATIVE
B-GLOBULIN SERPL ELPH-MCNC: 1.1 G/DL (ref 0.7–1.3)
C-ANCA TITR SER IF: NORMAL TITER
ENA SS-A AB SER-ACNC: <0.2 AI (ref 0–0.9)
ENA SS-B AB SER-ACNC: <0.2 AI (ref 0–0.9)
GAMMA GLOB SERPL ELPH-MCNC: 0.6 G/DL (ref 0.4–1.8)
GLOBULIN SER-MCNC: 2.5 G/DL (ref 2.2–3.9)
IGA SERPL-MCNC: 240 MG/DL (ref 64–422)
IGG SERPL-MCNC: 720 MG/DL (ref 586–1602)
IGM SERPL-MCNC: 14 MG/DL (ref 26–217)
INTERPRETATION SERPL IEP-IMP: ABNORMAL
LABORATORY COMMENT REPORT: ABNORMAL
M PROTEIN SERPL ELPH-MCNC: ABNORMAL G/DL
MYELOPEROXIDASE AB SER IA-ACNC: <0.2 UNITS (ref 0–0.9)
P-ANCA ATYPICAL TITR SER IF: NORMAL TITER
P-ANCA TITR SER IF: NORMAL TITER
PROT SERPL-MCNC: 5.9 G/DL (ref 6–8.5)
PROTEINASE3 AB SER IA-ACNC: <0.2 UNITS (ref 0–0.9)

## 2023-05-02 LAB
VIT B1 BLD-SCNC: 104.5 NMOL/L (ref 66.5–200)
ZINC SERPL-MCNC: 89 UG/DL (ref 44–115)

## 2023-05-03 LAB
A-TOCOPHEROL VIT E SERPL-MCNC: 10.1 MG/L (ref 9–29)
GAMMA TOCOPHEROL SERPL-MCNC: 3.1 MG/L (ref 0.5–4.9)
PYRIDOXAL PHOS SERPL-MCNC: 1.9 UG/L (ref 3.4–65.2)

## 2023-05-04 LAB — CRYOGLOB SER QL 1D COLD INC: NORMAL

## 2023-05-05 ENCOUNTER — LAB (OUTPATIENT)
Dept: LAB | Facility: HOSPITAL | Age: 83
End: 2023-05-05
Payer: MEDICARE

## 2023-05-05 PROCEDURE — 84166 PROTEIN E-PHORESIS/URINE/CSF: CPT | Performed by: STUDENT IN AN ORGANIZED HEALTH CARE EDUCATION/TRAINING PROGRAM

## 2023-05-05 PROCEDURE — 84156 ASSAY OF PROTEIN URINE: CPT | Performed by: STUDENT IN AN ORGANIZED HEALTH CARE EDUCATION/TRAINING PROGRAM

## 2023-05-09 LAB
ALBUMIN 24H MFR UR ELPH: 34 %
ALPHA1 GLOB 24H MFR UR ELPH: 3.7 %
ALPHA2 GLOB 24H MFR UR ELPH: 18.3 %
B-GLOBULIN 24H MFR UR ELPH: 32.2 %
GAMMA GLOB 24H MFR UR ELPH: 11.8 %
LABORATORY COMMENT REPORT: ABNORMAL
M PROTEIN 24H MFR UR ELPH: ABNORMAL %
PROT 24H UR-MRATE: 153 MG/24 HR (ref 30–150)
PROT UR-MCNC: 11.8 MG/DL

## 2023-05-18 ENCOUNTER — TELEPHONE (OUTPATIENT)
Dept: NEUROLOGY | Facility: CLINIC | Age: 83
End: 2023-05-18
Payer: MEDICARE

## 2023-05-18 NOTE — TELEPHONE ENCOUNTER
Caller: Cierra Kc    Relationship: Self    Best call back number: [phone]    What is the best time to reach you: LEAVING FOR APPT @ 10AM & WON'T RETURN UNTIL 11:15AM.     Who are you requesting to speak with (clinical staff, provider,  specific staff member): KRISTAL    Do you know the name of the person who called: KRISTAL    What was the call regarding: PATIENT IS RETURNING MD'S CALL RE: TEST RESULTS    PLEASE CALL & ADVISE    THANK YOU

## 2023-05-18 NOTE — TELEPHONE ENCOUNTER
SPOKE WITH PATIENT AND RELAYED TO PATIENT DR GIRALDO MESSAGE REGARDING HER LABS- PT VOICED UNDERSTANDING

## 2023-05-22 ENCOUNTER — TELEPHONE (OUTPATIENT)
Dept: NEUROLOGY | Facility: CLINIC | Age: 83
End: 2023-05-22
Payer: MEDICARE

## 2023-05-22 NOTE — TELEPHONE ENCOUNTER
"----- Message from Berlin Wu MD sent at 5/22/2023  8:18 AM EDT -----  Additional labwork updates have occurred. B6 level is deficient. I recommend taking 50 mg daily which should be purchased over the counter and we can recheck at the next appointment to see if the level has improved after taking it each day. The presence of a \"monoclonal antibody\" was not determined on the protein test. Can discuss further at next appointment.    Please call the pt and inform her of these updates and recommendation to take B6 50mg daily.  "

## 2023-05-22 NOTE — TELEPHONE ENCOUNTER
Caller: Cierra Kc    Relationship: Self    Best call back number: [phone]    What was the call regarding: PT CALLED TO CONFIRM SHE RECEIVED JOCELYNE'S VM. PT ASKS WHAT VITAMIN B6 IS TAKEN FOR?    Do you require a callback: YES, PLEASE.    PLEASE REVIEW AND ADVISE.

## 2023-06-09 ENCOUNTER — OFFICE VISIT (OUTPATIENT)
Dept: NEUROSURGERY | Facility: CLINIC | Age: 83
End: 2023-06-09
Payer: MEDICARE

## 2023-06-09 VITALS
HEART RATE: 65 BPM | TEMPERATURE: 98.3 F | OXYGEN SATURATION: 98 % | WEIGHT: 210 LBS | SYSTOLIC BLOOD PRESSURE: 120 MMHG | DIASTOLIC BLOOD PRESSURE: 80 MMHG | BODY MASS INDEX: 31.01 KG/M2

## 2023-06-09 DIAGNOSIS — I72.0 PSEUDOANEURYSM OF CAROTID ARTERY: Primary | ICD-10-CM

## 2023-06-09 DIAGNOSIS — M89.9 LESION OF BONE OF THORACIC SPINE: ICD-10-CM

## 2023-06-09 PROCEDURE — 3074F SYST BP LT 130 MM HG: CPT | Performed by: NEUROLOGICAL SURGERY

## 2023-06-09 PROCEDURE — 1160F RVW MEDS BY RX/DR IN RCRD: CPT | Performed by: NEUROLOGICAL SURGERY

## 2023-06-09 PROCEDURE — 3079F DIAST BP 80-89 MM HG: CPT | Performed by: NEUROLOGICAL SURGERY

## 2023-06-09 PROCEDURE — 99204 OFFICE O/P NEW MOD 45 MIN: CPT | Performed by: NEUROLOGICAL SURGERY

## 2023-06-09 PROCEDURE — 1159F MED LIST DOCD IN RCRD: CPT | Performed by: NEUROLOGICAL SURGERY

## 2023-07-25 ENCOUNTER — HOSPITAL ENCOUNTER (OUTPATIENT)
Dept: MRI IMAGING | Facility: HOSPITAL | Age: 83
Discharge: HOME | End: 2023-07-25
Admitting: NEUROLOGICAL SURGERY
Payer: MEDICARE

## 2023-07-25 ENCOUNTER — HOSPITAL ENCOUNTER (OUTPATIENT)
Dept: CT IMAGING | Facility: HOSPITAL | Age: 83
Discharge: HOME | End: 2023-07-25
Payer: MEDICARE

## 2023-07-25 DIAGNOSIS — I72.0 PSEUDOANEURYSM OF CAROTID ARTERY: ICD-10-CM

## 2023-07-25 DIAGNOSIS — M89.9 LESION OF BONE OF THORACIC SPINE: ICD-10-CM

## 2023-07-25 LAB — CREAT BLDA-MCNC: 0.8 MG/DL (ref 0.6–1.3)

## 2023-07-25 PROCEDURE — 25510000001 IOPAMIDOL PER 1 ML: Performed by: NEUROLOGICAL SURGERY

## 2023-07-25 PROCEDURE — 72158 MRI LUMBAR SPINE W/O & W/DYE: CPT

## 2023-07-25 PROCEDURE — 0 GADOBENATE DIMEGLUMINE 529 MG/ML SOLUTION: Performed by: NEUROLOGICAL SURGERY

## 2023-07-25 PROCEDURE — 70498 CT ANGIOGRAPHY NECK: CPT

## 2023-07-25 PROCEDURE — 82565 ASSAY OF CREATININE: CPT

## 2023-07-25 PROCEDURE — A9577 INJ MULTIHANCE: HCPCS | Performed by: NEUROLOGICAL SURGERY

## 2023-07-25 RX ADMIN — GADOBENATE DIMEGLUMINE 20 ML: 529 INJECTION, SOLUTION INTRAVENOUS at 10:32

## 2023-07-25 RX ADMIN — IOPAMIDOL 95 ML: 755 INJECTION, SOLUTION INTRAVENOUS at 10:02

## 2023-07-28 ENCOUNTER — TELEPHONE (OUTPATIENT)
Dept: NEUROSURGERY | Facility: CLINIC | Age: 83
End: 2023-07-28

## 2023-08-02 ENCOUNTER — OFFICE VISIT (OUTPATIENT)
Dept: NEUROSURGERY | Facility: CLINIC | Age: 83
End: 2023-08-02
Payer: MEDICARE

## 2023-08-02 VITALS
DIASTOLIC BLOOD PRESSURE: 80 MMHG | BODY MASS INDEX: 29.92 KG/M2 | TEMPERATURE: 97.8 F | HEART RATE: 64 BPM | SYSTOLIC BLOOD PRESSURE: 140 MMHG | WEIGHT: 202 LBS | RESPIRATION RATE: 16 BRPM | HEIGHT: 69 IN | OXYGEN SATURATION: 96 %

## 2023-08-02 DIAGNOSIS — N28.89 RIGHT RENAL MASS: ICD-10-CM

## 2023-08-02 DIAGNOSIS — S22.080D COMPRESSION FRACTURE OF T12 VERTEBRA WITH ROUTINE HEALING, SUBSEQUENT ENCOUNTER: ICD-10-CM

## 2023-08-02 DIAGNOSIS — I72.9 PSEUDOANEURYSM: Primary | ICD-10-CM

## 2023-08-02 PROBLEM — S22.080A T12 COMPRESSION FRACTURE: Status: ACTIVE | Noted: 2023-08-02

## 2023-08-03 ENCOUNTER — TELEPHONE (OUTPATIENT)
Dept: NEUROSURGERY | Facility: CLINIC | Age: 83
End: 2023-08-03
Payer: MEDICARE

## 2023-08-22 ENCOUNTER — TELEPHONE (OUTPATIENT)
Dept: NEUROLOGY | Facility: CLINIC | Age: 83
End: 2023-08-22
Payer: MEDICARE

## 2023-08-22 NOTE — TELEPHONE ENCOUNTER
Provider: DR GIRALDO   Caller: JAMIE VERA   Relationship to Patient: PATIENT     Phone Number: [phone]  Reason for Call: PATIENT CALLED STATES THAT DR GIRALDO HAS SENT HER BACK TO HER KIDNEY DR DUE TO A MASS ON HER KIDNEY. SHE SAW THAT DOCTOR TODAY 08/22/23 HOWEVER THAT  IS REQUESTING OV NOTES AND MRI FROM US IN REGARDS TO THE MASS.     PLEASE ADVISE

## 2023-08-23 ENCOUNTER — TELEPHONE (OUTPATIENT)
Dept: NEUROSURGERY | Facility: OTHER | Age: 83
End: 2023-08-23
Payer: MEDICARE

## 2023-08-23 NOTE — TELEPHONE ENCOUNTER
Provider: DR DIXON  Caller: JAMIE VERA   Relationship to Patient: PATIENT      Phone Number: [phone]  Reason for Call: PATIENT CALLED AND   STATES SHE SAW DR KAN/PCP-   DUE TO A MASS ON HER KIDNEY.   SHE SAW HIM ON 08/22/23 -HE  IS REQUESTING LAST OV NOTES W/  DR DIXON AND MRI FROM US IN   REGARDS TO THE MASS.     FAX FOUND ON [facility] WEBSITE- [phone]     SENT TO WRONG MD- ROUTING HIGH   PRIORITY DUE TO 2ND CALL AND HUB MISHAP.    PLEASE ADVISE - THANK YOU.

## 2023-08-23 NOTE — TELEPHONE ENCOUNTER
Note and report was faxed to Dr. Arce office 8/3/23. Will call the office to confirm the fax # and refax.

## 2023-08-23 NOTE — TELEPHONE ENCOUNTER
Returned patient's call, voice message did not say patients name I did not leave a message.  Neurosurgery is the one who sent patient to kidney specialist and did the MRI.

## 2023-09-18 ENCOUNTER — OFFICE VISIT (OUTPATIENT)
Dept: CARDIOLOGY | Facility: CLINIC | Age: 83
End: 2023-09-18
Payer: MEDICARE

## 2023-09-18 VITALS
BODY MASS INDEX: 28.49 KG/M2 | SYSTOLIC BLOOD PRESSURE: 132 MMHG | DIASTOLIC BLOOD PRESSURE: 74 MMHG | HEIGHT: 70 IN | HEART RATE: 61 BPM | WEIGHT: 199 LBS

## 2023-09-18 DIAGNOSIS — I25.10 ATHEROSCLEROSIS OF NATIVE CORONARY ARTERY OF NATIVE HEART WITHOUT ANGINA PECTORIS: ICD-10-CM

## 2023-09-18 DIAGNOSIS — Z01.810 PRE-OPERATIVE CARDIOVASCULAR EXAMINATION: ICD-10-CM

## 2023-09-18 DIAGNOSIS — I10 ESSENTIAL HYPERTENSION: Primary | ICD-10-CM

## 2023-09-18 DIAGNOSIS — I21.4 ACUTE NON-ST-ELEVATION MYOCARDIAL INFARCTION: ICD-10-CM

## 2023-09-18 DIAGNOSIS — R94.31 ABNORMAL ELECTROCARDIOGRAM: ICD-10-CM

## 2023-09-18 NOTE — PROGRESS NOTES
1 year follow up patient also needing cardiac clearance for cancer removal of the kidney.   Subjective:        Cierra Kc is a 83 y.o. female who here for follow up    CC  Surg clearance  HPI  83-year-old with atherosclerosis hypertension needs surgical clearance denies any chest pains or tightness in the chest     Problems Addressed this Visit          Cardiac and Vasculature    Atherosclerosis of coronary artery    Relevant Orders    Stress Test With Myocardial Perfusion One Day    Adult Transthoracic Echo Complete W/ Cont if Necessary Per Protocol    Essential hypertension - Primary    Relevant Orders    Stress Test With Myocardial Perfusion One Day    Adult Transthoracic Echo Complete W/ Cont if Necessary Per Protocol    Acute non-ST-elevation myocardial infarction    Relevant Orders    Stress Test With Myocardial Perfusion One Day    Adult Transthoracic Echo Complete W/ Cont if Necessary Per Protocol     Other Visit Diagnoses       Abnormal electrocardiogram        Relevant Orders    Stress Test With Myocardial Perfusion One Day    Adult Transthoracic Echo Complete W/ Cont if Necessary Per Protocol    Pre-operative cardiovascular examination        Relevant Orders    Stress Test With Myocardial Perfusion One Day    Adult Transthoracic Echo Complete W/ Cont if Necessary Per Protocol          Diagnoses         Codes Comments    Essential hypertension    -  Primary ICD-10-CM: I10  ICD-9-CM: 401.9     Atherosclerosis of native coronary artery of native heart without angina pectoris     ICD-10-CM: I25.10  ICD-9-CM: 414.01     Acute non-ST-elevation myocardial infarction     ICD-10-CM: I21.4  ICD-9-CM: 410.70     Abnormal electrocardiogram     ICD-10-CM: R94.31  ICD-9-CM: 794.31     Pre-operative cardiovascular examination     ICD-10-CM: Z01.810  ICD-9-CM: V72.81           .    The following portions of the patient's history were reviewed and updated as appropriate: allergies, current medications, past family  "history, past medical history, past social history, past surgical history and problem list.    Past Medical History:   Diagnosis Date    Cervical disc disease     COPD (chronic obstructive pulmonary disease)     Coronary artery disease     DDD (degenerative disc disease), lumbar     Diastolic CHF     GERD (gastroesophageal reflux disease)     History of DVT of lower extremity 1970s, 1980s    x 2    History of MRSA infection     IT STATED SHE THINKS IT WAS ON HER \"ARMS.\" STATED IT'S \"WAS A LONG TIME AGO.\"    Hyperlipidemia     Hypertension     Rectal abscess     Seizures     Stress incontinence     Stroke 2001    Vitamin B 12 deficiency      reports that she quit smoking about 33 years ago. Her smoking use included cigarettes. She has a 10.00 pack-year smoking history. She has never used smokeless tobacco. She reports current alcohol use. She reports that she does not use drugs.   Family History   Problem Relation Age of Onset    Hypertension Mother     Hyperlipidemia Mother     Hypertension Father     COPD Father     Stroke Brother     Clotting disorder Brother     Hypertension Brother     Hyperlipidemia Brother     Diabetes Maternal Aunt     Diabetes Maternal Uncle     Heart disease Maternal Grandmother     Stroke Maternal Grandmother     Diabetes Maternal Grandmother     Malig Hyperthermia Neg Hx        Review of Systems  Constitutional: No wt loss, fever, fatigue  Gastrointestinal: No nausea, abdominal pain  Behavioral/Psych: No insomnia or anxiety   Cardiovascular no chest pains or tightness in the chest  Objective:       Physical Exam           Physical Exam  /74 (BP Location: Left arm, Patient Position: Sitting, Cuff Size: Adult)   Pulse 61   Ht 177.8 cm (70\")   Wt 90.3 kg (199 lb)   BMI 28.55 kg/m²     General appearance: No acute changes   Eyes: Sclerae conjunctivae normal, pupils reactive   HENT: Atraumatic; oropharynx clear with moist mucous membranes and no mucosal ulcerations;  Neck: Trachea " midline; NECK, supple, no thyromegaly or lymphadenopathy   Lungs: Normal size and shape, normal breath sounds, equal distribution of air, no rales and rhonchi   CV: S1-S2 regular, no murmurs, no rub, no gallop   Abdomen: Soft, nontender; no masses , no abnormal abdominal sounds   Extremities: No deformity , normal color , no peripheral edema   Skin: Normal temperature, turgor and texture; no rash, ulcers  Psych: Appropriate affect, alert and oriented to person, place and time             ECG 12 Lead    Date/Time: 9/18/2023 12:56 PM  Performed by: Luana Hernandez MD  Authorized by: Luana Hernandez MD   Comparison: compared with previous ECG   Similar to previous ECG  Rhythm: sinus rhythm  ST Flattening: all    Clinical impression: non-specific ECG          Echocardiogram:        Current Outpatient Medications:     acetaminophen (TYLENOL) 325 MG tablet, Take 2 tablets by mouth Every 4 (Four) Hours As Needed for Mild Pain ., Disp: 30 tablet, Rfl: 0    amLODIPine (NORVASC) 5 MG tablet, Take 1 tablet by mouth Every Night., Disp: , Rfl:     aspirin 81 MG EC tablet, Take 1 tablet by mouth Daily., Disp: , Rfl:     atorvastatin (LIPITOR) 10 MG tablet, Take 1 tablet by mouth Every Night., Disp: , Rfl:     divalproex (DEPAKOTE) 500 MG DR tablet, Take 1 tablet by mouth Every Night., Disp: , Rfl:     famotidine (PEPCID) 20 MG tablet, Take 1 tablet by mouth 2 (Two) Times a Day., Disp: , Rfl:     furosemide (LASIX) 20 MG tablet, Take 1 tablet by mouth As Needed., Disp: , Rfl:     LORazepam (Ativan) 0.5 MG tablet, Take 1 tablet by mouth 1 (One) Time As Needed for Anxiety (pre-MRI anxiety) for up to 1 dose. Take 20 minutes prior to MRI, Disp: 1 tablet, Rfl: 0    metoprolol succinate XL (TOPROL-XL) 50 MG 24 hr tablet, Take 1 tablet by mouth Every Night., Disp: , Rfl:     Multiple Vitamins-Minerals (MULTI COMPLETE PO), Take 1 tablet by mouth Daily. HOLD FOR SURGERY, Disp: , Rfl:     nitroglycerin (NITROSTAT) 0.4 MG SL  tablet, Place 1 tablet under the tongue Every 5 (Five) Minutes As Needed for Chest Pain., Disp: , Rfl:     PARoxetine (PAXIL) 20 MG tablet, Take 1 tablet by mouth Every Night., Disp: , Rfl:     saccharomyces boulardii (FLORASTOR) 250 MG capsule, Take 1 capsule by mouth 2 (Two) Times a Day., Disp: , Rfl:     TURMERIC PO, Take 1 tablet by mouth Daily. HOLD FOR SURGERY, Disp: , Rfl:    Assessment:        Patient Active Problem List   Diagnosis    Abnormal finding on thallium stress test    Chest pain    Atherosclerosis of coronary artery    Essential hypertension    Shortness of breath    Unstable angina pectoris    Disorder of joint    History of cardiac catheterization    Abscess of rectum    Lumbar radiculopathy    Degeneration of intervertebral disc of lumbar region    Infected sebaceous cyst    Cerebral hemorrhage    Hyperlipidemia    Chronic diastolic heart failure    Depression    Sebaceous cyst of skin of breast    Moderate COPD (chronic obstructive pulmonary disease)    Chronic obstructive pulmonary disease    Community acquired pneumonia    Chronic low back pain    Abscess    Hyperlipemia    Acute non-ST-elevation myocardial infarction    Dyslipidemia    Leg swelling    DJD (degenerative joint disease) of knee    Right renal mass    T12 compression fracture               Plan:            ICD-10-CM ICD-9-CM   1. Essential hypertension  I10 401.9   2. Atherosclerosis of native coronary artery of native heart without angina pectoris  I25.10 414.01   3. Acute non-ST-elevation myocardial infarction  I21.4 410.70   4. Abnormal electrocardiogram  R94.31 794.31   5. Pre-operative cardiovascular examination  Z01.810 V72.81     1. Essential hypertension  Continue current treatment  - Stress Test With Myocardial Perfusion One Day  - Adult Transthoracic Echo Complete W/ Cont if Necessary Per Protocol    2. Atherosclerosis of native coronary artery of native heart without angina pectoris  Considering the patient's  symptoms as well as clinical situation and  EKG findings, along with cardiac risk factors, ischemic workup is necessary to rule out ischemic cardiomyopathy, stress induced arrhythmias, and functional capacity for diagnosis as well as prognostic consideration    - Stress Test With Myocardial Perfusion One Day  - Adult Transthoracic Echo Complete W/ Cont if Necessary Per Protocol    3. Acute non-ST-elevation myocardial infarction  Considering patient's medical condition as well as the risk factors, patient will require echocardiogram for further evaluation for the LV function, four-chamber evaluation, including the pressures, valvular function and  pericardial disease and pericardial effusion    - Stress Test With Myocardial Perfusion One Day  - Adult Transthoracic Echo Complete W/ Cont if Necessary Per Protocol    4. Abnormal electrocardiogram  Considering the patient's symptoms as well as clinical situation and  EKG findings, along with cardiac risk factors, ischemic workup is necessary to rule out ischemic cardiomyopathy, stress induced arrhythmias, and functional capacity for diagnosis as well as prognostic consideration    - Stress Test With Myocardial Perfusion One Day  - Adult Transthoracic Echo Complete W/ Cont if Necessary Per Protocol    5. Pre-operative cardiovascular examination  Considering patient's medical condition as well as the risk factors, patient will require echocardiogram for further evaluation for the LV function, four-chamber evaluation, including the pressures, valvular function and  pericardial disease and pericardial effusion    - Stress Test With Myocardial Perfusion One Day  - Adult Transthoracic Echo Complete W/ Cont if Necessary Per Protocol      Echo, monica    Follow up  COUNSELING:    Cierra Dick was given to patient for the following topics: diagnostic results, risk factor reductions, impressions, risks and benefits of treatment options and importance of treatment  compliance .       SMOKING COUNSELING:        Dictated using Dragon dictation

## 2023-09-19 ENCOUNTER — TELEPHONE (OUTPATIENT)
Dept: CARDIOLOGY | Facility: CLINIC | Age: 83
End: 2023-09-19

## 2023-09-19 ENCOUNTER — TELEPHONE (OUTPATIENT)
Dept: CARDIOLOGY | Facility: HOSPITAL | Age: 83
End: 2023-09-19
Payer: MEDICARE

## 2023-09-19 NOTE — TELEPHONE ENCOUNTER
Calling patient to reschedule her nuclear study for Thursday, since she is having a bone scan on Wednesday but no answer

## 2023-09-26 ENCOUNTER — HOSPITAL ENCOUNTER (OUTPATIENT)
Dept: CARDIOLOGY | Facility: HOSPITAL | Age: 83
Discharge: HOME | End: 2023-09-26
Admitting: INTERNAL MEDICINE
Payer: MEDICARE

## 2023-09-26 ENCOUNTER — HOSPITAL ENCOUNTER (OUTPATIENT)
Dept: CARDIOLOGY | Facility: HOSPITAL | Age: 83
Discharge: HOME | End: 2023-09-26
Payer: MEDICARE

## 2023-09-26 VITALS
OXYGEN SATURATION: 98 % | BODY MASS INDEX: 28.35 KG/M2 | WEIGHT: 198 LBS | HEIGHT: 70 IN | SYSTOLIC BLOOD PRESSURE: 150 MMHG | HEART RATE: 58 BPM | DIASTOLIC BLOOD PRESSURE: 70 MMHG

## 2023-09-26 VITALS
WEIGHT: 198.41 LBS | HEART RATE: 55 BPM | SYSTOLIC BLOOD PRESSURE: 137 MMHG | DIASTOLIC BLOOD PRESSURE: 64 MMHG | BODY MASS INDEX: 28.41 KG/M2 | HEIGHT: 70 IN

## 2023-09-26 LAB
AORTIC DIMENSIONLESS INDEX: 0.8 (DI)
ASCENDING AORTA: 3 CM
BH CV ECHO MEAS - ACS: 2 CM
BH CV ECHO MEAS - AO MAX PG: 4.6 MMHG
BH CV ECHO MEAS - AO MEAN PG: 2.5 MMHG
BH CV ECHO MEAS - AO ROOT DIAM: 3.1 CM
BH CV ECHO MEAS - AO V2 MAX: 107.5 CM/SEC
BH CV ECHO MEAS - AO V2 VTI: 28.4 CM
BH CV ECHO MEAS - AVA(I,D): 2.48 CM2
BH CV ECHO MEAS - EDV(CUBED): 106.5 ML
BH CV ECHO MEAS - EDV(MOD-SP2): 68.4 ML
BH CV ECHO MEAS - EDV(MOD-SP4): 78.2 ML
BH CV ECHO MEAS - EF(MOD-BP): 55.3 %
BH CV ECHO MEAS - EF(MOD-SP2): 56.1 %
BH CV ECHO MEAS - EF(MOD-SP4): 54.2 %
BH CV ECHO MEAS - ESV(CUBED): 32.8 ML
BH CV ECHO MEAS - ESV(MOD-SP2): 30 ML
BH CV ECHO MEAS - ESV(MOD-SP4): 35.8 ML
BH CV ECHO MEAS - FS: 32.5 %
BH CV ECHO MEAS - IVS/LVPW: 0.96 CM
BH CV ECHO MEAS - IVSD: 1.06 CM
BH CV ECHO MEAS - LAT PEAK E' VEL: 9.3 CM/SEC
BH CV ECHO MEAS - LV MASS(C)D: 185.3 GRAMS
BH CV ECHO MEAS - LV MAX PG: 3.3 MMHG
BH CV ECHO MEAS - LV MEAN PG: 2 MMHG
BH CV ECHO MEAS - LV V1 MAX: 90.9 CM/SEC
BH CV ECHO MEAS - LV V1 VTI: 22.4 CM
BH CV ECHO MEAS - LVIDD: 4.7 CM
BH CV ECHO MEAS - LVIDS: 3.2 CM
BH CV ECHO MEAS - LVOT AREA: 3.1 CM2
BH CV ECHO MEAS - LVOT DIAM: 2 CM
BH CV ECHO MEAS - LVPWD: 1.1 CM
BH CV ECHO MEAS - MED PEAK E' VEL: 8.4 CM/SEC
BH CV ECHO MEAS - MV A MAX VEL: 64.3 CM/SEC
BH CV ECHO MEAS - MV DEC SLOPE: 226 CM/SEC2
BH CV ECHO MEAS - MV DEC TIME: 0.18 SEC
BH CV ECHO MEAS - MV E MAX VEL: 78.8 CM/SEC
BH CV ECHO MEAS - MV E/A: 1.23
BH CV ECHO MEAS - MV MAX PG: 2.6 MMHG
BH CV ECHO MEAS - MV MEAN PG: 1 MMHG
BH CV ECHO MEAS - MV P1/2T: 103.8 MSEC
BH CV ECHO MEAS - MV V2 VTI: 29.5 CM
BH CV ECHO MEAS - MVA(P1/2T): 2.12 CM2
BH CV ECHO MEAS - MVA(VTI): 2.39 CM2
BH CV ECHO MEAS - PA ACC TIME: 0.12 SEC
BH CV ECHO MEAS - PA V2 MAX: 73.2 CM/SEC
BH CV ECHO MEAS - PULM A REVS DUR: 0.13 SEC
BH CV ECHO MEAS - PULM A REVS VEL: 27.1 CM/SEC
BH CV ECHO MEAS - PULM DIAS VEL: 36.9 CM/SEC
BH CV ECHO MEAS - PULM S/D: 0.75
BH CV ECHO MEAS - PULM SYS VEL: 27.6 CM/SEC
BH CV ECHO MEAS - QP/QS: 1.02
BH CV ECHO MEAS - RAP SYSTOLE: 8 MMHG
BH CV ECHO MEAS - RV MAX PG: 1.36 MMHG
BH CV ECHO MEAS - RV V1 MAX: 58.4 CM/SEC
BH CV ECHO MEAS - RV V1 VTI: 14.6 CM
BH CV ECHO MEAS - RVOT DIAM: 2.5 CM
BH CV ECHO MEAS - RVSP: 22 MMHG
BH CV ECHO MEAS - SV(LVOT): 70.4 ML
BH CV ECHO MEAS - SV(MOD-SP2): 38.4 ML
BH CV ECHO MEAS - SV(MOD-SP4): 42.4 ML
BH CV ECHO MEAS - SV(RVOT): 71.7 ML
BH CV ECHO MEAS - TAPSE (>1.6): 2.43 CM
BH CV ECHO MEAS - TR MAX PG: 14.4 MMHG
BH CV ECHO MEAS - TR MAX VEL: 190 CM/SEC
BH CV ECHO MEASUREMENTS AVERAGE E/E' RATIO: 8.9
BH CV XLRA - RV BASE: 2.47 CM
BH CV XLRA - RV LENGTH: 7.4 CM
BH CV XLRA - RV MID: 2.39 CM
BH CV XLRA - TDI S': 13.5 CM/SEC
LEFT ATRIUM VOLUME INDEX: 45 ML/M2
SINUS: 3.3 CM
STJ: 2.32 CM

## 2023-09-26 PROCEDURE — 25010000002 REGADENOSON 0.4 MG/5ML SOLUTION: Performed by: INTERNAL MEDICINE

## 2023-09-26 PROCEDURE — A9500 TC99M SESTAMIBI: HCPCS | Performed by: INTERNAL MEDICINE

## 2023-09-26 PROCEDURE — 93017 CV STRESS TEST TRACING ONLY: CPT

## 2023-09-26 PROCEDURE — 93306 TTE W/DOPPLER COMPLETE: CPT

## 2023-09-26 PROCEDURE — 0 TECHNETIUM SESTAMIBI: Performed by: INTERNAL MEDICINE

## 2023-09-26 PROCEDURE — 78452 HT MUSCLE IMAGE SPECT MULT: CPT

## 2023-09-26 RX ORDER — REGADENOSON 0.08 MG/ML
0.4 INJECTION, SOLUTION INTRAVENOUS
Status: COMPLETED | OUTPATIENT
Start: 2023-09-26 | End: 2023-09-26

## 2023-09-26 RX ADMIN — TECHNETIUM TC 99M SESTAMIBI 1 DOSE: 1 INJECTION INTRAVENOUS at 09:12

## 2023-09-26 RX ADMIN — REGADENOSON 0.4 MG: 0.08 INJECTION, SOLUTION INTRAVENOUS at 09:12

## 2023-09-26 RX ADMIN — TECHNETIUM TC 99M SESTAMIBI 1 DOSE: 1 INJECTION INTRAVENOUS at 07:13

## 2023-09-27 LAB
BH CV REST NUCLEAR ISOTOPE DOSE: 10.9 MCI
BH CV STRESS BP STAGE 1: NORMAL
BH CV STRESS COMMENTS STAGE 1: NORMAL
BH CV STRESS DOSE REGADENOSON STAGE 1: 0.4
BH CV STRESS DURATION MIN STAGE 1: 1
BH CV STRESS DURATION SEC STAGE 1: 0
BH CV STRESS HR STAGE 1: 62
BH CV STRESS NUCLEAR ISOTOPE DOSE: 32.9 MCI
BH CV STRESS O2 STAGE 1: 99
BH CV STRESS PROTOCOL 1: NORMAL
BH CV STRESS RECOVERY BP: NORMAL MMHG
BH CV STRESS RECOVERY HR: 62 BPM
BH CV STRESS RECOVERY O2: 98 %
BH CV STRESS STAGE 1: 1
LV EF NUC BP: 72 %
MAXIMAL PREDICTED HEART RATE: 137 BPM
PERCENT MAX PREDICTED HR: 45.26 %
STRESS BASELINE BP: NORMAL MMHG
STRESS BASELINE HR: 60 BPM
STRESS O2 SAT REST: 98 %
STRESS PERCENT HR: 53 %
STRESS POST ESTIMATED WORKLOAD: 1 METS
STRESS POST EXERCISE DUR MIN: 1 MIN
STRESS POST EXERCISE DUR SEC: 0 SEC
STRESS POST O2 SAT PEAK: 99 %
STRESS POST PEAK BP: NORMAL MMHG
STRESS POST PEAK HR: 62 BPM
STRESS TARGET HR: 116 BPM

## 2023-11-03 ENCOUNTER — TELEPHONE (OUTPATIENT)
Dept: NEUROLOGY | Facility: CLINIC | Age: 83
End: 2023-11-03

## 2023-11-03 ENCOUNTER — OFFICE VISIT (OUTPATIENT)
Dept: CARDIOLOGY | Facility: CLINIC | Age: 83
End: 2023-11-03
Payer: MEDICARE

## 2023-11-03 VITALS
HEIGHT: 69 IN | BODY MASS INDEX: 29.62 KG/M2 | DIASTOLIC BLOOD PRESSURE: 84 MMHG | SYSTOLIC BLOOD PRESSURE: 147 MMHG | WEIGHT: 200 LBS

## 2023-11-03 DIAGNOSIS — I25.118 CORONARY ARTERY DISEASE OF NATIVE ARTERY OF NATIVE HEART WITH STABLE ANGINA PECTORIS: ICD-10-CM

## 2023-11-03 DIAGNOSIS — I10 ESSENTIAL HYPERTENSION: Primary | ICD-10-CM

## 2023-11-03 DIAGNOSIS — Z01.810 PREOP CARDIOVASCULAR EXAM: ICD-10-CM

## 2023-11-03 DIAGNOSIS — I50.32 CHRONIC DIASTOLIC HEART FAILURE: ICD-10-CM

## 2023-11-03 DIAGNOSIS — E78.5 HYPERLIPIDEMIA, UNSPECIFIED HYPERLIPIDEMIA TYPE: ICD-10-CM

## 2023-11-03 NOTE — TELEPHONE ENCOUNTER
Provider: KRISTAL    Caller: JAMIE    Relationship to Patient: SELF    Phone Number: [phone] (PAT--GRANDDAUGHTER)    Reason for Call: PATIENT IS REQUESTING A CALL BACK TO DISCUSS IF DR. GIRALDO REQUIRES HER TO BE SEEN FURTHER. PATIENT IS HAVING SURGERY DUE TO MASS CANCER ON HER KIDNEY. PLEASE DO NOT CONTACT PATIENT UNTIL 11/15 AND ON.     PLEASE REVIEW    THANK YOU

## 2023-11-03 NOTE — PROGRESS NOTES
" Subjective:        Cierra Kc is a 83 y.o. female who here for follow up    Chief Complaint   Patient presents with    Results     Surgical clearance for mass removal in right kidney       HPI     This is an 83-year-old female who is known with this provider with coronary artery disease, hypertension, hyperlipidemia, chronic HFpEF, COPD, chronic back pain, GERD.  She follows up in office today for cardiac clearance kidney tumor removal.    Echo 2023 EF 51 to 55%, normal LV function.  Stress test 2023 myocardial perfusion imaging indicates a normal study with no evidence of ischemia.    The following portions of the patient's history were reviewed and updated as appropriate: allergies, current medications, past family history, past medical history, past social history, past surgical history and problem list.    Past Medical History:   Diagnosis Date    Cervical disc disease     COPD (chronic obstructive pulmonary disease)     Coronary artery disease     DDD (degenerative disc disease), lumbar     Diastolic CHF     GERD (gastroesophageal reflux disease)     History of DVT of lower extremity 1970s, 1980s    x 2    History of MRSA infection     IT STATED SHE THINKS IT WAS ON HER \"ARMS.\" STATED IT'S \"WAS A LONG TIME AGO.\"    Hyperlipidemia     Hypertension     Rectal abscess     Seizures     Stress incontinence     Stroke     Vitamin B 12 deficiency          reports that she quit smoking about 33 years ago. Her smoking use included cigarettes. She has a 10.00 pack-year smoking history. She has never used smokeless tobacco. She reports current alcohol use. She reports that she does not use drugs.     Family History   Problem Relation Age of Onset    Hypertension Mother          2023, was 103yrs    Hyperlipidemia Mother     Hypertension Father     COPD Father     Stroke Brother     Clotting disorder Brother     Hypertension Brother     Hyperlipidemia Brother     Diabetes Maternal Aunt     " Diabetes Maternal Uncle     Heart disease Maternal Grandmother     Stroke Maternal Grandmother     Diabetes Maternal Grandmother     Malig Hyperthermia Neg Hx        ROS     Review of Systems  Constitutional: No wt loss, fever, fatigue  Gastrointestinal: No nausea, abdominal pain  Behavioral/Psych: No insomnia or anxiety  Cardiovascular no chest pain or no shortness of breath      Objective:           Vitals and nursing note reviewed.   Constitutional:       Appearance: Well-developed.   HENT:      Head: Normocephalic.      Right Ear: External ear normal.      Left Ear: External ear normal.   Neck:      Vascular: No JVD.   Pulmonary:      Effort: Pulmonary effort is normal. No respiratory distress.      Breath sounds: Normal breath sounds. No stridor. No rales.   Cardiovascular:      Normal rate. Regular rhythm.      No gallop.    Pulses:     Intact distal pulses.   Edema:     Peripheral edema absent.   Abdominal:      General: Bowel sounds are normal. There is no distension.      Palpations: Abdomen is soft.      Tenderness: There is no abdominal tenderness. There is no guarding.   Musculoskeletal: Normal range of motion.         General: No tenderness.      Cervical back: Normal range of motion. Skin:     General: Skin is warm.   Neurological:      Mental Status: Alert and oriented to person, place, and time.      Deep Tendon Reflexes: Reflexes are normal and symmetric.   Psychiatric:         Judgment: Judgment normal.         Procedures    Interpretation Summary         Findings consistent with a normal ECG stress test.    Left ventricular ejection fraction is hyperdynamic (Calculated EF > 70%).    Myocardial perfusion imaging indicates a normal myocardial perfusion study with no evidence of ischemia.    Impressions are consistent with a low risk study.    Interpretation Summary         Left ventricular ejection fraction appears to be 51 - 55%.    Left ventricular diastolic function was normal.    Estimated right  ventricular systolic pressure from tricuspid regurgitation is normal (<35 mmHg).      Current Outpatient Medications:     acetaminophen (TYLENOL) 325 MG tablet, Take 2 tablets by mouth Every 4 (Four) Hours As Needed for Mild Pain ., Disp: 30 tablet, Rfl: 0    amLODIPine (NORVASC) 5 MG tablet, Take 1 tablet by mouth Every Night., Disp: , Rfl:     aspirin 81 MG EC tablet, Take 1 tablet by mouth Daily., Disp: , Rfl:     atorvastatin (LIPITOR) 10 MG tablet, Take 1 tablet by mouth Every Night., Disp: , Rfl:     divalproex (DEPAKOTE) 500 MG DR tablet, Take 1 tablet by mouth Every Night., Disp: , Rfl:     famotidine (PEPCID) 20 MG tablet, Take 1 tablet by mouth 2 (Two) Times a Day., Disp: , Rfl:     furosemide (LASIX) 20 MG tablet, Take 1 tablet by mouth As Needed., Disp: , Rfl:     metoprolol succinate XL (TOPROL-XL) 50 MG 24 hr tablet, Take 1 tablet by mouth Every Night., Disp: , Rfl:     Multiple Vitamins-Minerals (MULTI COMPLETE PO), Take 1 tablet by mouth Daily. HOLD FOR SURGERY, Disp: , Rfl:     nitroglycerin (NITROSTAT) 0.4 MG SL tablet, Place 1 tablet under the tongue Every 5 (Five) Minutes As Needed for Chest Pain., Disp: , Rfl:     Omega-3 300 MG capsule, Take 1 capsule by mouth Daily., Disp: , Rfl:     PARoxetine (PAXIL) 20 MG tablet, Take 1 tablet by mouth Every Night., Disp: , Rfl:     TURMERIC PO, Take 1 tablet by mouth Daily. HOLD FOR SURGERY, Disp: , Rfl:      Assessment:        Patient Active Problem List   Diagnosis    Abnormal finding on thallium stress test    Chest pain    Atherosclerosis of coronary artery    Essential hypertension    Shortness of breath    Unstable angina pectoris    Disorder of joint    History of cardiac catheterization    Abscess of rectum    Lumbar radiculopathy    Degeneration of intervertebral disc of lumbar region    Infected sebaceous cyst    Cerebral hemorrhage    Hyperlipidemia    Chronic diastolic heart failure    Depression    Sebaceous cyst of skin of breast    Moderate  COPD (chronic obstructive pulmonary disease)    Chronic obstructive pulmonary disease    Community acquired pneumonia    Chronic low back pain    Abscess    Hyperlipemia    Acute non-ST-elevation myocardial infarction    Dyslipidemia    Leg swelling    DJD (degenerative joint disease) of knee    Right renal mass    T12 compression fracture               Plan:   1.  Cardiac clearance: clear for surgery.     Stress testing carries 85% specificity/sensitivity/cardiac workup has been explained, and does not rule out coronary artery disease or future events, continue to emphasize on risk reductions for coronary artery disease.  Explained if symptoms continue please go to ER, and further w/p will be required.    has been seen and examined with no clinical signs of angina or CHF , patient is cleared for kidney surgery with non-modifiable risk factors. Patient has been advised to take cardiac meds with sip of water on the day of surgery.    Please use beta blocker for tachycardia preoperatively. Anticoagulation to be managed appropriately    Watch for chest pain, shortness of breath, palpitations, arrhythmias, and significant change in the blood pressure preoperatively. Please check EKG pre-op and postop if any questions, notify us if any change in patient's cardiovascular conditions.      2.  Coronary artery disease: no chest pain. Ischemic workup as above. Continue aspirin, atorvastatin and metoprolol. OK to hold aspirin for surgery and resume post op once ok with surgeon.     Risk reduction for the coronary artery disease, controlling the blood pressure, blood sugar management, cholesterol management, exercise, stress management, and proper compliance with medications and follow-up has been discussed    3.  Hypertension: 147/84 today in office. Continue amlodipine, toprol.     Importance of controlling hypertension and blood pressure  checkup on the regular basis has been explained. Hypertension as a silent killer has  been discussed. Risk reduction of the weight and regular exercises to control the hypertension has been explained.    4.  Hyperlipidemia: she reports her pcp manages her cholesterol and labs.     Risk of the hyperlipidemia, importance of the treatment has been explained. Pros and cons of the statins has been explained. Regular blood workup as well as side effects including the liver failure, myelopathy death has been explained.    5.  Chronic HFpEF: euvolemic on exam. Continue toprol and lasix.          No diagnosis found.    There are no diagnoses linked to this encounter.    COUNSELING: Lebron Dick was given to patient for the following topics: diagnostic results, risk factor reductions, impressions, risks and benefits of treatment options and importance of treatment compliance .       SMOKING COUNSELING: Denies    Cleared for surgery, follow-up in 1 year or sooner if needed.    Sincerely,   BLACK Vargas  [facility]  11/03/23  10:43 EDT    EMR Dragon/Transcription disclaimer:   Much of this encounter note is an electronic transcription/translation of spoken language to printed text. The electronic translation of spoken language may permit erroneous, or at times, nonsensical words or phrases to be inadvertently transcribed; Although I have reviewed the note for such errors, some may still exist.

## 2023-11-14 ENCOUNTER — TELEPHONE (OUTPATIENT)
Dept: CARDIOLOGY | Facility: CLINIC | Age: 83
End: 2023-11-14

## 2023-11-14 NOTE — TELEPHONE ENCOUNTER
Caller: JAMIE    Relationship: SELF    Best call back number: [phone]    What is the best time to reach you: ANY        What was the call regarding: PT RECENTLY HAD KIDNEY SURGERY AND THE DISCHARGE NOTES STATES TO NOT TAKE ASPRIN.  SHE WAS ON ASPRIN 81MG BEFORE AND WANTED TO CHECK WITH DR. PERDOMO ON WHAT TSHE SHOULD DO MOVING FORWARD.

## 2023-11-16 ENCOUNTER — TELEPHONE (OUTPATIENT)
Dept: NEUROLOGY | Facility: CLINIC | Age: 83
End: 2023-11-16

## 2023-11-16 NOTE — TELEPHONE ENCOUNTER
PATIENT WOULD LIKE DR GIRALDO AND TEAM TO KNOW THAT THE DOCTORS HE REFERRED HER TO FOUND CANCER IN HER KIDNEY - SHE HAS JUST COMPLETED SURGERY AND EVERYTHING IS GOING VERY WELL.    SHE WANTS YOU TO KNOW HOW GRATEFUL SHE IS FOR YOU SENDING HER WHEN YOU DID AND SAYS EVERYONE THERE IS SO KIND AND SHE REALLY APPRECIATES BH

## 2024-03-08 ENCOUNTER — TELEPHONE (OUTPATIENT)
Dept: CARDIOLOGY | Facility: CLINIC | Age: 84
End: 2024-03-08
Payer: MEDICARE

## 2024-03-08 NOTE — TELEPHONE ENCOUNTER
Caller: Cierra Kc     Relationship: PATIENT    Best call back number: [phone]    What is your medical concern? PT SAID SHE FEELS LIKE SHE'S BEING SMOTHERED WHEN SHE TAKES A NAP OR GOES TO SLEEP. SHE WANTED TO SEE DR. PERDOMO AND DIDN'T WANT TO GO TO THE ER BECAUSE SHE'S SCARED TO GET COVID. PLEASE ADVISE    How long has this issue been going on? 3/3/24    Is your provider already aware of this issue? NO    Have you been treated for this issue? NO

## 2024-03-11 ENCOUNTER — HOSPITAL ENCOUNTER (OUTPATIENT)
Facility: HOSPITAL | Age: 84
Discharge: HOME | End: 2024-03-13
Attending: INTERNAL MEDICINE | Admitting: INTERNAL MEDICINE
Payer: MEDICARE

## 2024-03-11 ENCOUNTER — OFFICE VISIT (OUTPATIENT)
Dept: CARDIOLOGY | Facility: CLINIC | Age: 84
End: 2024-03-11
Payer: MEDICARE

## 2024-03-11 VITALS
BODY MASS INDEX: 30.21 KG/M2 | DIASTOLIC BLOOD PRESSURE: 63 MMHG | HEART RATE: 64 BPM | HEIGHT: 69 IN | WEIGHT: 204 LBS | SYSTOLIC BLOOD PRESSURE: 141 MMHG

## 2024-03-11 DIAGNOSIS — I25.10 ATHEROSCLEROSIS OF NATIVE CORONARY ARTERY OF NATIVE HEART WITHOUT ANGINA PECTORIS: ICD-10-CM

## 2024-03-11 DIAGNOSIS — E78.5 HYPERLIPIDEMIA, UNSPECIFIED HYPERLIPIDEMIA TYPE: ICD-10-CM

## 2024-03-11 DIAGNOSIS — I20.0 UNSTABLE ANGINA PECTORIS: ICD-10-CM

## 2024-03-11 DIAGNOSIS — I10 ESSENTIAL HYPERTENSION: Primary | ICD-10-CM

## 2024-03-11 DIAGNOSIS — R07.2 PRECORDIAL PAIN: ICD-10-CM

## 2024-03-11 DIAGNOSIS — I25.118 CORONARY ARTERY DISEASE OF NATIVE ARTERY OF NATIVE HEART WITH STABLE ANGINA PECTORIS: ICD-10-CM

## 2024-03-11 LAB
ALBUMIN SERPL-MCNC: 3.9 G/DL (ref 3.5–5.2)
ALBUMIN/GLOB SERPL: 1.8 G/DL
ALP SERPL-CCNC: 70 U/L (ref 39–117)
ALT SERPL W P-5'-P-CCNC: 5 U/L (ref 1–33)
ANION GAP SERPL CALCULATED.3IONS-SCNC: 7.8 MMOL/L (ref 5–15)
AST SERPL-CCNC: 10 U/L (ref 1–32)
BASOPHILS # BLD AUTO: 0.08 10*3/MM3 (ref 0–0.2)
BASOPHILS NFR BLD AUTO: 1.5 % (ref 0–1.5)
BILIRUB SERPL-MCNC: 0.6 MG/DL (ref 0–1.2)
BUN SERPL-MCNC: 8 MG/DL (ref 8–23)
BUN/CREAT SERPL: 10.8 (ref 7–25)
CALCIUM SPEC-SCNC: 9.3 MG/DL (ref 8.6–10.5)
CHLORIDE SERPL-SCNC: 102 MMOL/L (ref 98–107)
CO2 SERPL-SCNC: 28.2 MMOL/L (ref 22–29)
CREAT SERPL-MCNC: 0.74 MG/DL (ref 0.57–1)
DEPRECATED RDW RBC AUTO: 43 FL (ref 37–54)
EGFRCR SERPLBLD CKD-EPI 2021: 80.4 ML/MIN/1.73
EOSINOPHIL # BLD AUTO: 0.21 10*3/MM3 (ref 0–0.4)
EOSINOPHIL NFR BLD AUTO: 3.9 % (ref 0.3–6.2)
ERYTHROCYTE [DISTWIDTH] IN BLOOD BY AUTOMATED COUNT: 14.1 % (ref 12.3–15.4)
GEN 5 2HR TROPONIN T REFLEX: 9 NG/L
GLOBULIN UR ELPH-MCNC: 2.2 GM/DL
GLUCOSE SERPL-MCNC: 83 MG/DL (ref 65–99)
HCT VFR BLD AUTO: 34.3 % (ref 34–46.6)
HGB BLD-MCNC: 10.9 G/DL (ref 12–15.9)
IMM GRANULOCYTES # BLD AUTO: 0.01 10*3/MM3 (ref 0–0.05)
IMM GRANULOCYTES NFR BLD AUTO: 0.2 % (ref 0–0.5)
LYMPHOCYTES # BLD AUTO: 1.64 10*3/MM3 (ref 0.7–3.1)
LYMPHOCYTES NFR BLD AUTO: 30.7 % (ref 19.6–45.3)
MAGNESIUM SERPL-MCNC: 2.1 MG/DL (ref 1.6–2.4)
MCH RBC QN AUTO: 26.7 PG (ref 26.6–33)
MCHC RBC AUTO-ENTMCNC: 31.8 G/DL (ref 31.5–35.7)
MCV RBC AUTO: 84.1 FL (ref 79–97)
MONOCYTES # BLD AUTO: 0.35 10*3/MM3 (ref 0.1–0.9)
MONOCYTES NFR BLD AUTO: 6.6 % (ref 5–12)
NEUTROPHILS NFR BLD AUTO: 3.05 10*3/MM3 (ref 1.7–7)
NEUTROPHILS NFR BLD AUTO: 57.1 % (ref 42.7–76)
NRBC BLD AUTO-RTO: 0 /100 WBC (ref 0–0.2)
PLATELET # BLD AUTO: 131 10*3/MM3 (ref 140–450)
PMV BLD AUTO: 10.7 FL (ref 6–12)
POTASSIUM SERPL-SCNC: 4.2 MMOL/L (ref 3.5–5.2)
PROT SERPL-MCNC: 6.1 G/DL (ref 6–8.5)
RBC # BLD AUTO: 4.08 10*6/MM3 (ref 3.77–5.28)
SODIUM SERPL-SCNC: 138 MMOL/L (ref 136–145)
TROPONIN T DELTA: 0 NG/L
TROPONIN T SERPL HS-MCNC: 9 NG/L
TSH SERPL DL<=0.05 MIU/L-ACNC: 1.6 UIU/ML (ref 0.27–4.2)
WBC NRBC COR # BLD AUTO: 5.34 10*3/MM3 (ref 3.4–10.8)

## 2024-03-11 PROCEDURE — G0378 HOSPITAL OBSERVATION PER HR: HCPCS

## 2024-03-11 PROCEDURE — 80053 COMPREHEN METABOLIC PANEL: CPT | Performed by: NURSE PRACTITIONER

## 2024-03-11 PROCEDURE — 3077F SYST BP >= 140 MM HG: CPT | Performed by: INTERNAL MEDICINE

## 2024-03-11 PROCEDURE — 93000 ELECTROCARDIOGRAM COMPLETE: CPT | Performed by: INTERNAL MEDICINE

## 2024-03-11 PROCEDURE — 84484 ASSAY OF TROPONIN QUANT: CPT | Performed by: NURSE PRACTITIONER

## 2024-03-11 PROCEDURE — 83735 ASSAY OF MAGNESIUM: CPT | Performed by: NURSE PRACTITIONER

## 2024-03-11 PROCEDURE — 93005 ELECTROCARDIOGRAM TRACING: CPT | Performed by: NURSE PRACTITIONER

## 2024-03-11 PROCEDURE — 85025 COMPLETE CBC W/AUTO DIFF WBC: CPT | Performed by: NURSE PRACTITIONER

## 2024-03-11 PROCEDURE — 93010 ELECTROCARDIOGRAM REPORT: CPT | Performed by: INTERNAL MEDICINE

## 2024-03-11 PROCEDURE — 84443 ASSAY THYROID STIM HORMONE: CPT | Performed by: NURSE PRACTITIONER

## 2024-03-11 PROCEDURE — 99214 OFFICE O/P EST MOD 30 MIN: CPT | Performed by: INTERNAL MEDICINE

## 2024-03-11 PROCEDURE — 3078F DIAST BP <80 MM HG: CPT | Performed by: INTERNAL MEDICINE

## 2024-03-11 RX ORDER — POLYETHYLENE GLYCOL 3350 17 G/17G
17 POWDER, FOR SOLUTION ORAL DAILY PRN
Status: DISCONTINUED | OUTPATIENT
Start: 2024-03-11 | End: 2024-03-13 | Stop reason: HOSPADM

## 2024-03-11 RX ORDER — METOPROLOL SUCCINATE 50 MG/1
50 TABLET, EXTENDED RELEASE ORAL NIGHTLY
Status: DISCONTINUED | OUTPATIENT
Start: 2024-03-11 | End: 2024-03-13 | Stop reason: HOSPADM

## 2024-03-11 RX ORDER — SODIUM CHLORIDE 0.9 % (FLUSH) 0.9 %
10 SYRINGE (ML) INJECTION AS NEEDED
Status: DISCONTINUED | OUTPATIENT
Start: 2024-03-11 | End: 2024-03-13 | Stop reason: HOSPADM

## 2024-03-11 RX ORDER — BISACODYL 5 MG/1
5 TABLET, DELAYED RELEASE ORAL DAILY PRN
Status: DISCONTINUED | OUTPATIENT
Start: 2024-03-11 | End: 2024-03-13 | Stop reason: HOSPADM

## 2024-03-11 RX ORDER — VIBEGRON 75 MG/1
TABLET, FILM COATED ORAL
COMMUNITY
Start: 2024-03-08

## 2024-03-11 RX ORDER — FAMOTIDINE 20 MG/1
20 TABLET, FILM COATED ORAL
Status: DISCONTINUED | OUTPATIENT
Start: 2024-03-11 | End: 2024-03-12

## 2024-03-11 RX ORDER — BISACODYL 10 MG
10 SUPPOSITORY, RECTAL RECTAL DAILY PRN
Status: DISCONTINUED | OUTPATIENT
Start: 2024-03-11 | End: 2024-03-13 | Stop reason: HOSPADM

## 2024-03-11 RX ORDER — AMOXICILLIN 250 MG
2 CAPSULE ORAL 2 TIMES DAILY PRN
Status: DISCONTINUED | OUTPATIENT
Start: 2024-03-11 | End: 2024-03-13 | Stop reason: HOSPADM

## 2024-03-11 RX ORDER — DIVALPROEX SODIUM 500 MG/1
500 TABLET, DELAYED RELEASE ORAL NIGHTLY
Status: DISCONTINUED | OUTPATIENT
Start: 2024-03-11 | End: 2024-03-13 | Stop reason: HOSPADM

## 2024-03-11 RX ORDER — NITROGLYCERIN 0.4 MG/1
0.4 TABLET SUBLINGUAL
Status: DISCONTINUED | OUTPATIENT
Start: 2024-03-11 | End: 2024-03-13

## 2024-03-11 RX ORDER — ATORVASTATIN CALCIUM 20 MG/1
10 TABLET, FILM COATED ORAL NIGHTLY
Status: DISCONTINUED | OUTPATIENT
Start: 2024-03-11 | End: 2024-03-13 | Stop reason: HOSPADM

## 2024-03-11 RX ORDER — AMLODIPINE BESYLATE 5 MG/1
5 TABLET ORAL NIGHTLY
Status: DISCONTINUED | OUTPATIENT
Start: 2024-03-11 | End: 2024-03-13 | Stop reason: HOSPADM

## 2024-03-11 RX ORDER — PAROXETINE HYDROCHLORIDE 20 MG/1
20 TABLET, FILM COATED ORAL NIGHTLY
Status: DISCONTINUED | OUTPATIENT
Start: 2024-03-11 | End: 2024-03-13 | Stop reason: HOSPADM

## 2024-03-11 RX ORDER — FUROSEMIDE 20 MG/1
20 TABLET ORAL AS NEEDED
Status: DISCONTINUED | OUTPATIENT
Start: 2024-03-11 | End: 2024-03-13 | Stop reason: HOSPADM

## 2024-03-11 RX ORDER — SODIUM CHLORIDE 9 MG/ML
40 INJECTION, SOLUTION INTRAVENOUS AS NEEDED
Status: DISCONTINUED | OUTPATIENT
Start: 2024-03-11 | End: 2024-03-13 | Stop reason: HOSPADM

## 2024-03-11 RX ORDER — SODIUM CHLORIDE 0.9 % (FLUSH) 0.9 %
10 SYRINGE (ML) INJECTION EVERY 12 HOURS SCHEDULED
Status: DISCONTINUED | OUTPATIENT
Start: 2024-03-11 | End: 2024-03-13 | Stop reason: HOSPADM

## 2024-03-11 RX ORDER — HYDRALAZINE HYDROCHLORIDE 20 MG/ML
10 INJECTION INTRAMUSCULAR; INTRAVENOUS EVERY 6 HOURS PRN
Status: DISCONTINUED | OUTPATIENT
Start: 2024-03-11 | End: 2024-03-13

## 2024-03-11 RX ORDER — ASPIRIN 81 MG/1
81 TABLET ORAL DAILY
Status: DISCONTINUED | OUTPATIENT
Start: 2024-03-11 | End: 2024-03-13 | Stop reason: HOSPADM

## 2024-03-11 RX ADMIN — METOPROLOL SUCCINATE 50 MG: 50 TABLET, EXTENDED RELEASE ORAL at 21:17

## 2024-03-11 RX ADMIN — ATORVASTATIN CALCIUM 10 MG: 20 TABLET, FILM COATED ORAL at 21:14

## 2024-03-11 RX ADMIN — ASPIRIN 81 MG: 81 TABLET, COATED ORAL at 18:21

## 2024-03-11 RX ADMIN — Medication 10 ML: at 23:29

## 2024-03-11 RX ADMIN — FAMOTIDINE 20 MG: 20 TABLET, FILM COATED ORAL at 18:21

## 2024-03-11 RX ADMIN — PAROXETINE HYDROCHLORIDE HEMIHYDRATE 20 MG: 20 TABLET, FILM COATED ORAL at 21:15

## 2024-03-11 RX ADMIN — DIVALPROEX SODIUM 500 MG: 500 TABLET, DELAYED RELEASE ORAL at 21:15

## 2024-03-11 RX ADMIN — AMLODIPINE BESYLATE 5 MG: 5 TABLET ORAL at 21:16

## 2024-03-11 NOTE — PROGRESS NOTES
"Sob    Subjective:        Cierra Kc is a 83 y.o. female who here for follow up    CC  Sever cp while sitting, radiated to jaw, lasted 8-10 minutes, still have heaviness  H/o stroke  HPI  83-year-old female had an episode of tightness and pressure sensations in the chest radiating to the jaw lasted for approximately 8 to 10 minutes associated feeling weak as well as tired     Problems Addressed this Visit          Cardiac and Vasculature    Chest pain    Coronary artery disease of native artery of native heart with stable angina pectoris    Essential hypertension - Primary    Hyperlipidemia     Diagnoses         Codes Comments    Essential hypertension    -  Primary ICD-10-CM: I10  ICD-9-CM: 401.9     Coronary artery disease of native artery of native heart with stable angina pectoris     ICD-10-CM: I25.118  ICD-9-CM: 414.01, 413.9     Hyperlipidemia, unspecified hyperlipidemia type     ICD-10-CM: E78.5  ICD-9-CM: 272.4     Atherosclerosis of native coronary artery of native heart without angina pectoris     ICD-10-CM: I25.10  ICD-9-CM: 414.01     Precordial pain     ICD-10-CM: R07.2  ICD-9-CM: 786.51           .    The following portions of the patient's history were reviewed and updated as appropriate: allergies, current medications, past family history, past medical history, past social history, past surgical history and problem list.    Past Medical History:   Diagnosis Date    Cervical disc disease     COPD (chronic obstructive pulmonary disease)     Coronary artery disease     DDD (degenerative disc disease), lumbar     Diastolic CHF     GERD (gastroesophageal reflux disease)     History of DVT of lower extremity 1970s, 1980s    x 2    History of MRSA infection     IT STATED SHE THINKS IT WAS ON HER \"ARMS.\" STATED IT'S \"WAS A LONG TIME AGO.\"    Hyperlipidemia     Hypertension     Rectal abscess     Seizures     Stress incontinence     Stroke 2001    Vitamin B 12 deficiency      reports that she quit " "smoking about 33 years ago. Her smoking use included cigarettes. She started smoking about 53 years ago. She has a 10 pack-year smoking history. She has never used smokeless tobacco. She reports current alcohol use. She reports that she does not use drugs.   Family History   Problem Relation Age of Onset    Hypertension Mother          2023, was 103yrs    Hyperlipidemia Mother     Hypertension Father     COPD Father     Stroke Brother     Clotting disorder Brother     Hypertension Brother     Hyperlipidemia Brother     Diabetes Maternal Aunt     Diabetes Maternal Uncle     Heart disease Maternal Grandmother     Stroke Maternal Grandmother     Diabetes Maternal Grandmother     Malig Hyperthermia Neg Hx        Review of Systems  Constitutional: No wt loss, fever, fatigue  Gastrointestinal: No nausea, abdominal pain  Behavioral/Psych: No insomnia or anxiety   Cardiovascular chest pain radiating to jaw  Objective:       Physical Exam           Physical Exam  /63   Pulse 64   Ht 175.3 cm (69\")   Wt 92.5 kg (204 lb)   BMI 30.13 kg/m²     General appearance: No acute changes   Eyes: Sclerae conjunctivae normal, pupils reactive   HENT: Atraumatic; oropharynx clear with moist mucous membranes and no mucosal ulcerations;  Neck: Trachea midline; NECK, supple, no thyromegaly or lymphadenopathy   Lungs: Normal size and shape, normal breath sounds, equal distribution of air, no rales and rhonchi   CV: S1-S2 regular, no murmurs, no rub, no gallop   Abdomen: Soft, nontender; no masses , no abnormal abdominal sounds   Extremities: No deformity , normal color , no peripheral edema   Skin: Normal temperature, turgor and texture; no rash, ulcers  Psych: Appropriate affect, alert and oriented to person, place and time             ECG 12 Lead    Date/Time: 3/11/2024 10:15 AM  Performed by: Luana Hernandez MD    Authorized by: Luana Hernandez MD  Comparison: compared with previous ECG   Comparison to " previous ECG: St-t changes worse    Clinical impression: abnormal EKG            Echocardiogram:    Results for orders placed in visit on 09/18/23    Adult Transthoracic Echo Complete W/ Cont if Necessary Per Protocol    Interpretation Summary    Left ventricular ejection fraction appears to be 51 - 55%.    Left ventricular diastolic function was normal.    Estimated right ventricular systolic pressure from tricuspid regurgitation is normal (<35 mmHg).        No current facility-administered medications for this visit.  No current outpatient medications on file.    Facility-Administered Medications Ordered in Other Visits:     amLODIPine (NORVASC) tablet 5 mg, 5 mg, Oral, Nightly, Luana Hernandez MD, 5 mg at 03/11/24 2116    aspirin EC tablet 81 mg, 81 mg, Oral, Daily, Luana Hernandez MD, 81 mg at 03/11/24 1821    atorvastatin (LIPITOR) tablet 10 mg, 10 mg, Oral, Nightly, Luana Hernandez MD, 10 mg at 03/11/24 2114    sennosides-docusate (PERICOLACE) 8.6-50 MG per tablet 2 tablet, 2 tablet, Oral, BID PRN **AND** polyethylene glycol (MIRALAX) packet 17 g, 17 g, Oral, Daily PRN **AND** bisacodyl (DULCOLAX) EC tablet 5 mg, 5 mg, Oral, Daily PRN **AND** bisacodyl (DULCOLAX) suppository 10 mg, 10 mg, Rectal, Daily PRN, Kimberly Matt APRN    Calcium Replacement - Follow Nurse / BPA Driven Protocol, , Does not apply, PRN, Kimberly Matt APRN    divalproex (DEPAKOTE) DR tablet 500 mg, 500 mg, Oral, Nightly, Luana Hernandez MD, 500 mg at 03/11/24 2115    famotidine (PEPCID) tablet 20 mg, 20 mg, Oral, BID AC, Luana Hernandez MD, 20 mg at 03/11/24 1821    furosemide (LASIX) tablet 20 mg, 20 mg, Oral, PRN, Luana Hernandez MD    hydrALAZINE (APRESOLINE) injection 10 mg, 10 mg, Intravenous, Q6H PRN, Kimberly Matt APRN    Magnesium Standard Dose Replacement - Follow Nurse / BPA Driven Protocol, , Does not apply, Vernell CAMPBELL Elizabeth, APRN    metoprolol succinate XL  (TOPROL-XL) 24 hr tablet 50 mg, 50 mg, Oral, Nightly, Luana Hernandez MD, 50 mg at 03/11/24 2117    nitroglycerin (NITROSTAT) SL tablet 0.4 mg, 0.4 mg, Sublingual, Q5 Min PRN, Luana Hernandez MD    PARoxetine (PAXIL) tablet 20 mg, 20 mg, Oral, Nightly, Luana Hernandez MD, 20 mg at 03/11/24 2115    Phosphorus Replacement - Follow Nurse / BPA Driven Protocol, , Does not apply, PRN, Kimberly Matt APRN    Potassium Replacement - Follow Nurse / BPA Driven Protocol, , Does not apply, PRN, Kimberly Matt APRN    regadenoson (LEXISCAN) injection 0.4 mg, 0.4 mg, Intravenous, Once in imaging, Luana Hernandez MD    sodium chloride 0.9 % flush 10 mL, 10 mL, Intravenous, Q12H, Kimberly Matt APRN, 10 mL at 03/11/24 2329    sodium chloride 0.9 % flush 10 mL, 10 mL, Intravenous, PRN, Kimberly Matt APRN    sodium chloride 0.9 % infusion 40 mL, 40 mL, Intravenous, PRN, Kimberly Matt APRN    technetium sestamibi (CARDIOLITE) injection 1 dose, 1 dose, Intravenous, Once in imaging, Luana Hernandez MD   Assessment:                Plan:          ICD-10-CM ICD-9-CM   1. Essential hypertension  I10 401.9   2. Coronary artery disease of native artery of native heart with stable angina pectoris  I25.118 414.01     413.9   3. Hyperlipidemia, unspecified hyperlipidemia type  E78.5 272.4   4. Atherosclerosis of native coronary artery of native heart without angina pectoris  I25.10 414.01   5. Precordial pain  R07.2 786.51     1. Essential hypertension  Patient understands importance of blood pressure check at home which patient does regularly and the blood pressures are well under control to the level of less than 140/90      2. Coronary artery disease of native artery of native heart with stable angina pectoris  Significant chest pains and tightness in the chest radiating to jaw    3. Hyperlipidemia, unspecified hyperlipidemia type  Continue current treatment    4.  Atherosclerosis of native coronary artery of native heart without angina pectoris  Has significant chest pain with abnormal EKG we will need further evaluation    5. Precordial pain  As the patient continues to have the chest pain patient will be admitted to the hospital      Admit, still have cp    R/o mi    Elle    Most likely will need cath    Also has rt leg wound  COUNSELING:    Cierra StricklandeCounseling was given to patient for the following topics: diagnostic results, risk factor reductions, impressions, risks and benefits of treatment options and importance of treatment compliance .       SMOKING COUNSELING:        Dictated using Dragon dictation

## 2024-03-11 NOTE — Clinical Note
Hemostasis started on the right radial artery. Manual pressure applied to vessel. Manual pressure was held by ZIA/Mandeep VANN(R). Manual pressure was held for 5 min. Hemostasis achieved successfully.

## 2024-03-11 NOTE — PLAN OF CARE
Goal Outcome Evaluation:  Plan of Care Reviewed With: patient        Progress: improving  Outcome Evaluation: Admit for chest pain. NSR on monitor, room air, blood pressures stable. No c/o chest pain or shortness of air. Up with assist and walker. Admission complete, home meds pending restart. Possible heart cath tomorrow.

## 2024-03-11 NOTE — TELEPHONE ENCOUNTER
S/W PATIENT ON 3/8/2024, AN APPT WAS MADE FOR 3/11/2024 AND PT WAS ENCOURAGED TO GO TO THE ER IF SYMPTOMS WORSENED.  SHE WAS AGREEABLE AND VERBALIZED UNDERSTANDING.

## 2024-03-12 ENCOUNTER — APPOINTMENT (OUTPATIENT)
Dept: CARDIOLOGY | Facility: HOSPITAL | Age: 84
End: 2024-03-12
Payer: MEDICARE

## 2024-03-12 ENCOUNTER — APPOINTMENT (OUTPATIENT)
Dept: NUCLEAR MEDICINE | Facility: HOSPITAL | Age: 84
End: 2024-03-12
Payer: MEDICARE

## 2024-03-12 LAB
ALBUMIN SERPL-MCNC: 3.5 G/DL (ref 3.5–5.2)
ALBUMIN/GLOB SERPL: 1.7 G/DL
ALP SERPL-CCNC: 72 U/L (ref 39–117)
ALT SERPL W P-5'-P-CCNC: 10 U/L (ref 1–33)
ANION GAP SERPL CALCULATED.3IONS-SCNC: 8 MMOL/L (ref 5–15)
AORTIC ARCH: 3.2 CM
AORTIC DIMENSIONLESS INDEX: 0.8 (DI)
ASCENDING AORTA: 2.8 CM
AST SERPL-CCNC: 11 U/L (ref 1–32)
BASOPHILS # BLD AUTO: 0.07 10*3/MM3 (ref 0–0.2)
BASOPHILS NFR BLD AUTO: 1.6 % (ref 0–1.5)
BH CV ECHO MEAS - ACS: 2.22 CM
BH CV ECHO MEAS - AO MAX PG: 5.9 MMHG
BH CV ECHO MEAS - AO MEAN PG: 2.8 MMHG
BH CV ECHO MEAS - AO ROOT DIAM: 3.3 CM
BH CV ECHO MEAS - AO V2 MAX: 121.6 CM/SEC
BH CV ECHO MEAS - AO V2 VTI: 25.3 CM
BH CV ECHO MEAS - AVA(I,D): 2.5 CM2
BH CV ECHO MEAS - EDV(CUBED): 61.5 ML
BH CV ECHO MEAS - EDV(MOD-SP2): 72 ML
BH CV ECHO MEAS - EDV(MOD-SP4): 89 ML
BH CV ECHO MEAS - EF(MOD-BP): 56 %
BH CV ECHO MEAS - EF(MOD-SP2): 61.1 %
BH CV ECHO MEAS - EF(MOD-SP4): 55.1 %
BH CV ECHO MEAS - ESV(CUBED): 17.7 ML
BH CV ECHO MEAS - ESV(MOD-SP2): 28 ML
BH CV ECHO MEAS - ESV(MOD-SP4): 40 ML
BH CV ECHO MEAS - FS: 33.9 %
BH CV ECHO MEAS - IVS/LVPW: 1.03 CM
BH CV ECHO MEAS - IVSD: 1.02 CM
BH CV ECHO MEAS - LAT PEAK E' VEL: 5.4 CM/SEC
BH CV ECHO MEAS - LV MASS(C)D: 126.1 GRAMS
BH CV ECHO MEAS - LV MAX PG: 2.27 MMHG
BH CV ECHO MEAS - LV MEAN PG: 1.37 MMHG
BH CV ECHO MEAS - LV V1 MAX: 75.4 CM/SEC
BH CV ECHO MEAS - LV V1 VTI: 19.8 CM
BH CV ECHO MEAS - LVIDD: 3.9 CM
BH CV ECHO MEAS - LVIDS: 2.6 CM
BH CV ECHO MEAS - LVOT AREA: 3.2 CM2
BH CV ECHO MEAS - LVOT DIAM: 2.02 CM
BH CV ECHO MEAS - LVPWD: 1 CM
BH CV ECHO MEAS - MED PEAK E' VEL: 5.9 CM/SEC
BH CV ECHO MEAS - MV A DUR: 0.13 SEC
BH CV ECHO MEAS - MV A MAX VEL: 64.7 CM/SEC
BH CV ECHO MEAS - MV DEC SLOPE: 233.1 CM/SEC2
BH CV ECHO MEAS - MV DEC TIME: 0.25 SEC
BH CV ECHO MEAS - MV E MAX VEL: 65.6 CM/SEC
BH CV ECHO MEAS - MV E/A: 1.01
BH CV ECHO MEAS - MV MAX PG: 1.98 MMHG
BH CV ECHO MEAS - MV MEAN PG: 0.75 MMHG
BH CV ECHO MEAS - MV P1/2T: 86.2 MSEC
BH CV ECHO MEAS - MV V2 VTI: 21.9 CM
BH CV ECHO MEAS - MVA(P1/2T): 2.6 CM2
BH CV ECHO MEAS - MVA(VTI): 2.9 CM2
BH CV ECHO MEAS - PA ACC TIME: 0.13 SEC
BH CV ECHO MEAS - PA V2 MAX: 90.3 CM/SEC
BH CV ECHO MEAS - PULM A REVS DUR: 0.13 SEC
BH CV ECHO MEAS - PULM A REVS VEL: 38.3 CM/SEC
BH CV ECHO MEAS - PULM DIAS VEL: 42.7 CM/SEC
BH CV ECHO MEAS - PULM S/D: 1.04
BH CV ECHO MEAS - PULM SYS VEL: 44.4 CM/SEC
BH CV ECHO MEAS - QP/QS: 0.94
BH CV ECHO MEAS - RV MAX PG: 1.67 MMHG
BH CV ECHO MEAS - RV V1 MAX: 64.7 CM/SEC
BH CV ECHO MEAS - RV V1 VTI: 14 CM
BH CV ECHO MEAS - RVOT DIAM: 2.32 CM
BH CV ECHO MEAS - SV(LVOT): 63.3 ML
BH CV ECHO MEAS - SV(MOD-SP2): 44 ML
BH CV ECHO MEAS - SV(MOD-SP4): 49 ML
BH CV ECHO MEAS - SV(RVOT): 59.2 ML
BH CV ECHO MEASUREMENTS AVERAGE E/E' RATIO: 11.61
BH CV REST NUCLEAR ISOTOPE DOSE: 11.5 MCI
BH CV STRESS COMMENTS STAGE 1: NORMAL
BH CV STRESS DOSE REGADENOSON STAGE 1: 0.4
BH CV STRESS DURATION MIN STAGE 1: 0
BH CV STRESS DURATION SEC STAGE 1: 10
BH CV STRESS GRADE STAGE 1: 10
BH CV STRESS METS STAGE 1: 5
BH CV STRESS NUCLEAR ISOTOPE DOSE: 35 MCI
BH CV STRESS PROTOCOL 1: NORMAL
BH CV STRESS RECOVERY BP: NORMAL MMHG
BH CV STRESS RECOVERY HR: 63 BPM
BH CV STRESS SPEED STAGE 1: 1.7
BH CV STRESS STAGE 1: 1
BH CV XLRA - RV BASE: 3.7 CM
BH CV XLRA - RV LENGTH: 7.6 CM
BH CV XLRA - RV MID: 2.9 CM
BH CV XLRA - TDI S': 15.1 CM/SEC
BILIRUB SERPL-MCNC: 0.4 MG/DL (ref 0–1.2)
BUN SERPL-MCNC: 10 MG/DL (ref 8–23)
BUN/CREAT SERPL: 14.1 (ref 7–25)
CALCIUM SPEC-SCNC: 9.2 MG/DL (ref 8.6–10.5)
CHLORIDE SERPL-SCNC: 106 MMOL/L (ref 98–107)
CO2 SERPL-SCNC: 27 MMOL/L (ref 22–29)
CREAT SERPL-MCNC: 0.71 MG/DL (ref 0.57–1)
DEPRECATED RDW RBC AUTO: 43 FL (ref 37–54)
EGFRCR SERPLBLD CKD-EPI 2021: 84.5 ML/MIN/1.73
EOSINOPHIL # BLD AUTO: 0.2 10*3/MM3 (ref 0–0.4)
EOSINOPHIL NFR BLD AUTO: 4.5 % (ref 0.3–6.2)
ERYTHROCYTE [DISTWIDTH] IN BLOOD BY AUTOMATED COUNT: 13.9 % (ref 12.3–15.4)
GLOBULIN UR ELPH-MCNC: 2.1 GM/DL
GLUCOSE SERPL-MCNC: 114 MG/DL (ref 65–99)
HCT VFR BLD AUTO: 34.7 % (ref 34–46.6)
HGB BLD-MCNC: 11.1 G/DL (ref 12–15.9)
IMM GRANULOCYTES # BLD AUTO: 0.01 10*3/MM3 (ref 0–0.05)
IMM GRANULOCYTES NFR BLD AUTO: 0.2 % (ref 0–0.5)
LEFT ATRIUM VOLUME INDEX: 23.9 ML/M2
LV EF NUC BP: 60 %
LYMPHOCYTES # BLD AUTO: 1.55 10*3/MM3 (ref 0.7–3.1)
LYMPHOCYTES NFR BLD AUTO: 34.5 % (ref 19.6–45.3)
MAGNESIUM SERPL-MCNC: 2 MG/DL (ref 1.6–2.4)
MAXIMAL PREDICTED HEART RATE: 137 BPM
MCH RBC QN AUTO: 27.1 PG (ref 26.6–33)
MCHC RBC AUTO-ENTMCNC: 32 G/DL (ref 31.5–35.7)
MCV RBC AUTO: 84.8 FL (ref 79–97)
MONOCYTES # BLD AUTO: 0.42 10*3/MM3 (ref 0.1–0.9)
MONOCYTES NFR BLD AUTO: 9.4 % (ref 5–12)
NEUTROPHILS NFR BLD AUTO: 2.24 10*3/MM3 (ref 1.7–7)
NEUTROPHILS NFR BLD AUTO: 49.8 % (ref 42.7–76)
NRBC BLD AUTO-RTO: 0 /100 WBC (ref 0–0.2)
PERCENT MAX PREDICTED HR: 50.36 %
PLATELET # BLD AUTO: 136 10*3/MM3 (ref 140–450)
PMV BLD AUTO: 10.8 FL (ref 6–12)
POTASSIUM SERPL-SCNC: 3.8 MMOL/L (ref 3.5–5.2)
PROT SERPL-MCNC: 5.6 G/DL (ref 6–8.5)
RBC # BLD AUTO: 4.09 10*6/MM3 (ref 3.77–5.28)
SINUS: 3 CM
SODIUM SERPL-SCNC: 141 MMOL/L (ref 136–145)
STRESS BASELINE BP: NORMAL MMHG
STRESS BASELINE HR: 58 BPM
STRESS PERCENT HR: 59 %
STRESS POST PEAK BP: NORMAL MMHG
STRESS POST PEAK HR: 69 BPM
STRESS TARGET HR: 116 BPM
TROPONIN T SERPL HS-MCNC: 11 NG/L
WBC NRBC COR # BLD AUTO: 4.49 10*3/MM3 (ref 3.4–10.8)

## 2024-03-12 PROCEDURE — 84484 ASSAY OF TROPONIN QUANT: CPT | Performed by: NURSE PRACTITIONER

## 2024-03-12 PROCEDURE — 78452 HT MUSCLE IMAGE SPECT MULT: CPT

## 2024-03-12 PROCEDURE — 0 TECHNETIUM SESTAMIBI: Performed by: INTERNAL MEDICINE

## 2024-03-12 PROCEDURE — 85025 COMPLETE CBC W/AUTO DIFF WBC: CPT | Performed by: NURSE PRACTITIONER

## 2024-03-12 PROCEDURE — G0378 HOSPITAL OBSERVATION PER HR: HCPCS

## 2024-03-12 PROCEDURE — 93017 CV STRESS TEST TRACING ONLY: CPT

## 2024-03-12 PROCEDURE — 80053 COMPREHEN METABOLIC PANEL: CPT | Performed by: NURSE PRACTITIONER

## 2024-03-12 PROCEDURE — 25010000002 REGADENOSON 0.4 MG/5ML SOLUTION: Performed by: INTERNAL MEDICINE

## 2024-03-12 PROCEDURE — A9500 TC99M SESTAMIBI: HCPCS | Performed by: INTERNAL MEDICINE

## 2024-03-12 PROCEDURE — 25510000001 PERFLUTREN (DEFINITY) 8.476 MG IN SODIUM CHLORIDE (PF) 0.9 % 10 ML INJECTION: Performed by: NURSE PRACTITIONER

## 2024-03-12 PROCEDURE — 93018 CV STRESS TEST I&R ONLY: CPT | Performed by: INTERNAL MEDICINE

## 2024-03-12 PROCEDURE — 93010 ELECTROCARDIOGRAM REPORT: CPT | Performed by: INTERNAL MEDICINE

## 2024-03-12 PROCEDURE — 83735 ASSAY OF MAGNESIUM: CPT | Performed by: NURSE PRACTITIONER

## 2024-03-12 PROCEDURE — 78452 HT MUSCLE IMAGE SPECT MULT: CPT | Performed by: INTERNAL MEDICINE

## 2024-03-12 PROCEDURE — 93005 ELECTROCARDIOGRAM TRACING: CPT | Performed by: NURSE PRACTITIONER

## 2024-03-12 PROCEDURE — 93306 TTE W/DOPPLER COMPLETE: CPT | Performed by: INTERNAL MEDICINE

## 2024-03-12 PROCEDURE — 93306 TTE W/DOPPLER COMPLETE: CPT

## 2024-03-12 RX ORDER — PANTOPRAZOLE SODIUM 40 MG/1
40 TABLET, DELAYED RELEASE ORAL
Status: DISCONTINUED | OUTPATIENT
Start: 2024-03-13 | End: 2024-03-12

## 2024-03-12 RX ORDER — PANTOPRAZOLE SODIUM 40 MG/1
40 TABLET, DELAYED RELEASE ORAL
Status: DISCONTINUED | OUTPATIENT
Start: 2024-03-12 | End: 2024-03-13 | Stop reason: HOSPADM

## 2024-03-12 RX ORDER — REGADENOSON 0.08 MG/ML
0.4 INJECTION, SOLUTION INTRAVENOUS
Status: COMPLETED | OUTPATIENT
Start: 2024-03-12 | End: 2024-03-12

## 2024-03-12 RX ADMIN — PANTOPRAZOLE SODIUM 40 MG: 40 TABLET, DELAYED RELEASE ORAL at 17:40

## 2024-03-12 RX ADMIN — Medication 10 ML: at 21:11

## 2024-03-12 RX ADMIN — REGADENOSON 0.4 MG: 0.08 INJECTION, SOLUTION INTRAVENOUS at 07:55

## 2024-03-12 RX ADMIN — ASPIRIN 81 MG: 81 TABLET, COATED ORAL at 12:10

## 2024-03-12 RX ADMIN — FAMOTIDINE 20 MG: 20 TABLET, FILM COATED ORAL at 12:15

## 2024-03-12 RX ADMIN — DIVALPROEX SODIUM 500 MG: 500 TABLET, DELAYED RELEASE ORAL at 21:10

## 2024-03-12 RX ADMIN — METOPROLOL SUCCINATE 50 MG: 50 TABLET, EXTENDED RELEASE ORAL at 21:10

## 2024-03-12 RX ADMIN — ATORVASTATIN CALCIUM 10 MG: 20 TABLET, FILM COATED ORAL at 21:09

## 2024-03-12 RX ADMIN — TECHNETIUM TC 99M SESTAMIBI 1 DOSE: 1 INJECTION INTRAVENOUS at 08:35

## 2024-03-12 RX ADMIN — TECHNETIUM TC 99M SESTAMIBI 1 DOSE: 1 INJECTION INTRAVENOUS at 06:07

## 2024-03-12 RX ADMIN — AMLODIPINE BESYLATE 5 MG: 5 TABLET ORAL at 21:10

## 2024-03-12 RX ADMIN — PERFLUTREN 2 ML: 6.52 INJECTION, SUSPENSION INTRAVENOUS at 13:25

## 2024-03-12 RX ADMIN — PAROXETINE HYDROCHLORIDE HEMIHYDRATE 20 MG: 20 TABLET, FILM COATED ORAL at 21:10

## 2024-03-12 NOTE — PROGRESS NOTES
Discussed results of abnormal stress test with Ms. Kc and she has decided to proceed with cardiac cath.  Risk and benefits have been discussed.    Procedure, risks and options of cardiac cath explained to pt INCLUDING BUT NOT LIMITED TO MI, STROKE, DEATH, INFECTION HAEMORRHAGE, . Pt understands well and agrees with no further questions.    BLACK Murcia

## 2024-03-12 NOTE — H&P
"Sob    Subjective:        Cierra Kc is a 83 y.o. female who here for follow up    CC  Sever cp while sitting, radiated to jaw, lasted 8-10 minutes, still have heaviness  H/o stroke  HPI  83-year-old female had an episode of tightness and pressure sensations in the chest radiating to the jaw lasted for approximately 8 to 10 minutes associated feeling weak as well as tired     Problems Addressed this Visit    None  Diagnoses    None.       .    The following portions of the patient's history were reviewed and updated as appropriate: allergies, current medications, past family history, past medical history, past social history, past surgical history and problem list.    Past Medical History:   Diagnosis Date    Cervical disc disease     COPD (chronic obstructive pulmonary disease)     Coronary artery disease     DDD (degenerative disc disease), lumbar     Diastolic CHF     GERD (gastroesophageal reflux disease)     History of DVT of lower extremity ,     x 2    History of MRSA infection     IT STATED SHE THINKS IT WAS ON HER \"ARMS.\" STATED IT'S \"WAS A LONG TIME AGO.\"    Hyperlipidemia     Hypertension     Rectal abscess     Seizures     Stress incontinence     Stroke     Vitamin B 12 deficiency      reports that she quit smoking about 33 years ago. Her smoking use included cigarettes. She started smoking about 53 years ago. She has a 10 pack-year smoking history. She has never used smokeless tobacco. She reports current alcohol use. She reports that she does not use drugs.   Family History   Problem Relation Age of Onset    Hypertension Mother          2023, was 103yrs    Hyperlipidemia Mother     Hypertension Father     COPD Father     Stroke Brother     Clotting disorder Brother     Hypertension Brother     Hyperlipidemia Brother     Diabetes Maternal Aunt     Diabetes Maternal Uncle     Heart disease Maternal Grandmother     Stroke Maternal Grandmother     Diabetes Maternal Grandmother  "    Malig Hyperthermia Neg Hx        Review of Systems  Constitutional: No wt loss, fever, fatigue  Gastrointestinal: No nausea, abdominal pain  Behavioral/Psych: No insomnia or anxiety   Cardiovascular chest pain radiating to jaw  Objective:       Physical Exam           Physical Exam  /51 (BP Location: Left arm, Patient Position: Lying)   Pulse 60   Temp 97.7 °F (36.5 °C) (Oral)   Resp 18   Wt 92.4 kg (203 lb 11.3 oz)   SpO2 96%   BMI 30.08 kg/m²     General appearance: No acute changes   Eyes: Sclerae conjunctivae normal, pupils reactive   HENT: Atraumatic; oropharynx clear with moist mucous membranes and no mucosal ulcerations;  Neck: Trachea midline; NECK, supple, no thyromegaly or lymphadenopathy   Lungs: Normal size and shape, normal breath sounds, equal distribution of air, no rales and rhonchi   CV: S1-S2 regular, no murmurs, no rub, no gallop   Abdomen: Soft, nontender; no masses , no abnormal abdominal sounds   Extremities: No deformity , normal color , no peripheral edema   Skin: Normal temperature, turgor and texture; no rash, ulcers  Psych: Appropriate affect, alert and oriented to person, place and time           Procedures      Echocardiogram:    Results for orders placed in visit on 09/18/23    Adult Transthoracic Echo Complete W/ Cont if Necessary Per Protocol    Interpretation Summary    Left ventricular ejection fraction appears to be 51 - 55%.    Left ventricular diastolic function was normal.    Estimated right ventricular systolic pressure from tricuspid regurgitation is normal (<35 mmHg).          Current Facility-Administered Medications:     amLODIPine (NORVASC) tablet 5 mg, 5 mg, Oral, Nightly, Luana Hernandez MD, 5 mg at 03/11/24 2116    aspirin EC tablet 81 mg, 81 mg, Oral, Daily, Luana Hernandez MD, 81 mg at 03/11/24 1821    atorvastatin (LIPITOR) tablet 10 mg, 10 mg, Oral, Nightly, Luana Hernandez MD, 10 mg at 03/11/24 2114    sennosides-docusate  (PERICOLACE) 8.6-50 MG per tablet 2 tablet, 2 tablet, Oral, BID PRN **AND** polyethylene glycol (MIRALAX) packet 17 g, 17 g, Oral, Daily PRN **AND** bisacodyl (DULCOLAX) EC tablet 5 mg, 5 mg, Oral, Daily PRN **AND** bisacodyl (DULCOLAX) suppository 10 mg, 10 mg, Rectal, Daily PRN, Kimberly Matt APRN    Calcium Replacement - Follow Nurse / BPA Driven Protocol, , Does not apply, PRN, Kimberly Matt APRN    divalproex (DEPAKOTE) DR tablet 500 mg, 500 mg, Oral, Nightly, Luana Hernandez MD, 500 mg at 03/11/24 2115    famotidine (PEPCID) tablet 20 mg, 20 mg, Oral, BID AC, Luana Hernandez MD, 20 mg at 03/11/24 1821    furosemide (LASIX) tablet 20 mg, 20 mg, Oral, PRN, Luana Hernandez MD    hydrALAZINE (APRESOLINE) injection 10 mg, 10 mg, Intravenous, Q6H PRN, Kimberly Matt APRN    Magnesium Standard Dose Replacement - Follow Nurse / BPA Driven Protocol, , Does not apply, PRN, Kimberly Matt APRN    metoprolol succinate XL (TOPROL-XL) 24 hr tablet 50 mg, 50 mg, Oral, Nightly, Luana Hernandez MD, 50 mg at 03/11/24 2117    nitroglycerin (NITROSTAT) SL tablet 0.4 mg, 0.4 mg, Sublingual, Q5 Min PRN, Luana Hernandez MD    PARoxetine (PAXIL) tablet 20 mg, 20 mg, Oral, Nightly, Luana Hernandez MD, 20 mg at 03/11/24 2115    Phosphorus Replacement - Follow Nurse / BPA Driven Protocol, , Does not apply, PRN, Kimberly Matt APRN    Potassium Replacement - Follow Nurse / BPA Driven Protocol, , Does not apply, PRN, Kimberly Matt APRN    sodium chloride 0.9 % flush 10 mL, 10 mL, Intravenous, Q12H, Kimberly Matt APRN, 10 mL at 03/11/24 6604    sodium chloride 0.9 % flush 10 mL, 10 mL, Intravenous, PRN, Kimberly Matt, BLACK    sodium chloride 0.9 % infusion 40 mL, 40 mL, Intravenous, PRN, Kimberly Matt APRN   Assessment:                Plan:        No diagnosis found.    1. Essential hypertension  Patient understands importance of blood  pressure check at home which patient does regularly and the blood pressures are well under control to the level of less than 140/90      2. Coronary artery disease of native artery of native heart with stable angina pectoris  Significant chest pains and tightness in the chest radiating to jaw    3. Hyperlipidemia, unspecified hyperlipidemia type  Continue current treatment    4. Atherosclerosis of native coronary artery of native heart without angina pectoris  Has significant chest pain with abnormal EKG we will need further evaluation    5. Precordial pain  As the patient continues to have the chest pain patient will be admitted to the hospital      Admit, still have cp    R/o mi    Elle    Most likely will need cath    Also has rt leg wound  COUNSELING:    Cierra Rameyeling was given to patient for the following topics: diagnostic results, risk factor reductions, impressions, risks and benefits of treatment options and importance of treatment compliance .       SMOKING COUNSELING:        Dictated using Dragon dictation

## 2024-03-12 NOTE — CASE MANAGEMENT/SOCIAL WORK
Discharge Planning Assessment  [facility]     Patient Name: Cierra Kc  MRN: [id number]  Today's Date: 3/12/2024    Admit Date: 3/11/2024    Plan: Home   Discharge Needs Assessment       Row Name 03/12/24 1623       Living Environment    People in Home spouse    Current Living Arrangements home    Potentially Unsafe Housing Conditions none    Primary Care Provided by self    Provides Primary Care For no one    Family Caregiver if Needed grandchild(carlito), adult    Quality of Family Relationships helpful;involved;supportive    Able to Return to Prior Arrangements yes       Resource/Environmental Concerns    Resource/Environmental Concerns none    Transportation Concerns none       Transition Planning    Patient/Family Anticipates Transition to home with family    Transportation Anticipated family or friend will provide       Discharge Needs Assessment    Readmission Within the Last 30 Days no previous admission in last 30 days    Equipment Currently Used at Home none    Concerns to be Addressed denies needs/concerns at this time    Anticipated Changes Related to Illness none    Equipment Needed After Discharge none    Provided Post Acute Provider List? N/A    Provided Post Acute Provider Quality & Resource List? N/A                   Discharge Plan       Row Name 03/12/24 1623       Plan    Plan Home    Plan Comments CCP met with patient at bedside. Introduced self and explained role of CCP. Patient confirmed the information on her face sheet is accurate. Patients PCP is Guille Yoon. Patient uses [facility]. Patient lives at home alone. She states that she is independent. Patient uses a cane and walker. Patient lives at home alone. Patient uses a cane and walker. Patient has used HH in past but could not remember company. She declined HH. No history of HH. Patient states she plans home with family transport. CCP following.                  Continued Care and Services - Admitted Since 3/11/2024    No  active coordination exists for this encounter.       Expected Discharge Date and Time       Expected Discharge Date Expected Discharge Time    Mar 13, 2024            Demographic Summary       Row Name 03/12/24 1256       General Information    Admission Type observation    Arrived From emergency department    Required Notices Provided Observation Status Notice    Referral Source admission list    Reason for Consult discharge planning    Preferred Language English                   Functional Status    No documentation.                  Psychosocial    No documentation.                  Abuse/Neglect    No documentation.                  Legal    No documentation.                  Substance Abuse    No documentation.                  Patient Forms    No documentation.                     OBED Kelsey

## 2024-03-12 NOTE — PLAN OF CARE
Goal Outcome Evaluation:         Patient resting at this time, denies any chest pain nor discomfort last night , vital signs stable, SR on the monitor, pt on room air, no SOA, pt NPO for stress test in the morning, bed alarm intact , call light in reach , cont to monitor

## 2024-03-12 NOTE — PLAN OF CARE
Goal Outcome Evaluation:  Plan of Care Reviewed With: patient        Progress: improving       Pt a/o x4, RA, VSS. No acute changes. NPO @ MN for cath in AM. MEHRAN.
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
## 2024-03-13 ENCOUNTER — READMISSION MANAGEMENT (OUTPATIENT)
Dept: CALL CENTER | Facility: HOSPITAL | Age: 84
End: 2024-03-13
Payer: MEDICARE

## 2024-03-13 VITALS
SYSTOLIC BLOOD PRESSURE: 139 MMHG | RESPIRATION RATE: 14 BRPM | OXYGEN SATURATION: 100 % | DIASTOLIC BLOOD PRESSURE: 65 MMHG | WEIGHT: 203 LBS | BODY MASS INDEX: 30.07 KG/M2 | TEMPERATURE: 98.6 F | HEIGHT: 69 IN | HEART RATE: 62 BPM

## 2024-03-13 LAB
ALBUMIN SERPL-MCNC: 3.7 G/DL (ref 3.5–5.2)
ALBUMIN/GLOB SERPL: 1.9 G/DL
ALP SERPL-CCNC: 69 U/L (ref 39–117)
ALT SERPL W P-5'-P-CCNC: 6 U/L (ref 1–33)
ANION GAP SERPL CALCULATED.3IONS-SCNC: 9 MMOL/L (ref 5–15)
AST SERPL-CCNC: 8 U/L (ref 1–32)
BASOPHILS # BLD AUTO: 0.06 10*3/MM3 (ref 0–0.2)
BASOPHILS NFR BLD AUTO: 1.5 % (ref 0–1.5)
BILIRUB SERPL-MCNC: 0.5 MG/DL (ref 0–1.2)
BUN SERPL-MCNC: 15 MG/DL (ref 8–23)
BUN/CREAT SERPL: 17.2 (ref 7–25)
CALCIUM SPEC-SCNC: 9.5 MG/DL (ref 8.6–10.5)
CHLORIDE SERPL-SCNC: 106 MMOL/L (ref 98–107)
CHOLEST SERPL-MCNC: 173 MG/DL (ref 0–200)
CO2 SERPL-SCNC: 26 MMOL/L (ref 22–29)
CREAT SERPL-MCNC: 0.87 MG/DL (ref 0.57–1)
DEPRECATED RDW RBC AUTO: 43.8 FL (ref 37–54)
EGFRCR SERPLBLD CKD-EPI 2021: 66.2 ML/MIN/1.73
EOSINOPHIL # BLD AUTO: 0.19 10*3/MM3 (ref 0–0.4)
EOSINOPHIL NFR BLD AUTO: 4.6 % (ref 0.3–6.2)
ERYTHROCYTE [DISTWIDTH] IN BLOOD BY AUTOMATED COUNT: 14.2 % (ref 12.3–15.4)
GLOBULIN UR ELPH-MCNC: 1.9 GM/DL
GLUCOSE SERPL-MCNC: 95 MG/DL (ref 65–99)
HBA1C MFR BLD: 5.4 % (ref 4.8–5.6)
HCT VFR BLD AUTO: 34.7 % (ref 34–46.6)
HDLC SERPL-MCNC: 45 MG/DL (ref 40–60)
HGB BLD-MCNC: 11 G/DL (ref 12–15.9)
LDLC SERPL CALC-MCNC: 103 MG/DL (ref 0–100)
LDLC/HDLC SERPL: 2.21 {RATIO}
LYMPHOCYTES # BLD AUTO: 1.56 10*3/MM3 (ref 0.7–3.1)
LYMPHOCYTES NFR BLD AUTO: 38.1 % (ref 19.6–45.3)
MAGNESIUM SERPL-MCNC: 1.9 MG/DL (ref 1.6–2.4)
MCH RBC QN AUTO: 26.8 PG (ref 26.6–33)
MCHC RBC AUTO-ENTMCNC: 31.7 G/DL (ref 31.5–35.7)
MCV RBC AUTO: 84.6 FL (ref 79–97)
MONOCYTES # BLD AUTO: 0.29 10*3/MM3 (ref 0.1–0.9)
MONOCYTES NFR BLD AUTO: 7.1 % (ref 5–12)
NEUTROPHILS NFR BLD AUTO: 1.98 10*3/MM3 (ref 1.7–7)
NEUTROPHILS NFR BLD AUTO: 48.5 % (ref 42.7–76)
NT-PROBNP SERPL-MCNC: 171 PG/ML (ref 0–1800)
PLATELET # BLD AUTO: 126 10*3/MM3 (ref 140–450)
PMV BLD AUTO: 11.6 FL (ref 6–12)
POTASSIUM SERPL-SCNC: 4.1 MMOL/L (ref 3.5–5.2)
PROT SERPL-MCNC: 5.6 G/DL (ref 6–8.5)
RBC # BLD AUTO: 4.1 10*6/MM3 (ref 3.77–5.28)
SODIUM SERPL-SCNC: 141 MMOL/L (ref 136–145)
TRIGL SERPL-MCNC: 142 MG/DL (ref 0–150)
TSH SERPL DL<=0.05 MIU/L-ACNC: 1.59 UIU/ML (ref 0.27–4.2)
VLDLC SERPL-MCNC: 25 MG/DL (ref 5–40)
WBC NRBC COR # BLD AUTO: 4.09 10*3/MM3 (ref 3.4–10.8)

## 2024-03-13 PROCEDURE — 85007 BL SMEAR W/DIFF WBC COUNT: CPT | Performed by: NURSE PRACTITIONER

## 2024-03-13 PROCEDURE — 25010000002 MIDAZOLAM PER 1 MG: Performed by: INTERNAL MEDICINE

## 2024-03-13 PROCEDURE — 85025 COMPLETE CBC W/AUTO DIFF WBC: CPT | Performed by: NURSE PRACTITIONER

## 2024-03-13 PROCEDURE — 83880 ASSAY OF NATRIURETIC PEPTIDE: CPT | Performed by: HOSPITALIST

## 2024-03-13 PROCEDURE — 25010000002 FENTANYL CITRATE (PF) 50 MCG/ML SOLUTION: Performed by: INTERNAL MEDICINE

## 2024-03-13 PROCEDURE — 83036 HEMOGLOBIN GLYCOSYLATED A1C: CPT | Performed by: HOSPITALIST

## 2024-03-13 PROCEDURE — 80053 COMPREHEN METABOLIC PANEL: CPT | Performed by: NURSE PRACTITIONER

## 2024-03-13 PROCEDURE — 25510000001 IOPAMIDOL PER 1 ML: Performed by: INTERNAL MEDICINE

## 2024-03-13 PROCEDURE — 93458 L HRT ARTERY/VENTRICLE ANGIO: CPT | Performed by: INTERNAL MEDICINE

## 2024-03-13 PROCEDURE — C1894 INTRO/SHEATH, NON-LASER: HCPCS | Performed by: INTERNAL MEDICINE

## 2024-03-13 PROCEDURE — 84443 ASSAY THYROID STIM HORMONE: CPT | Performed by: HOSPITALIST

## 2024-03-13 PROCEDURE — 83735 ASSAY OF MAGNESIUM: CPT | Performed by: NURSE PRACTITIONER

## 2024-03-13 PROCEDURE — 25010000002 HEPARIN (PORCINE) PER 1000 UNITS: Performed by: INTERNAL MEDICINE

## 2024-03-13 PROCEDURE — C1769 GUIDE WIRE: HCPCS | Performed by: INTERNAL MEDICINE

## 2024-03-13 PROCEDURE — G0378 HOSPITAL OBSERVATION PER HR: HCPCS

## 2024-03-13 PROCEDURE — 80061 LIPID PANEL: CPT | Performed by: HOSPITALIST

## 2024-03-13 RX ORDER — LIDOCAINE HYDROCHLORIDE 20 MG/ML
INJECTION, SOLUTION INFILTRATION; PERINEURAL
Status: DISCONTINUED | OUTPATIENT
Start: 2024-03-13 | End: 2024-03-13 | Stop reason: HOSPADM

## 2024-03-13 RX ORDER — NITROGLYCERIN 0.4 MG/1
0.4 TABLET SUBLINGUAL
Status: DISCONTINUED | OUTPATIENT
Start: 2024-03-13 | End: 2024-03-13

## 2024-03-13 RX ORDER — VERAPAMIL HYDROCHLORIDE 2.5 MG/ML
INJECTION, SOLUTION INTRAVENOUS
Status: DISCONTINUED | OUTPATIENT
Start: 2024-03-13 | End: 2024-03-13 | Stop reason: HOSPADM

## 2024-03-13 RX ORDER — MIDAZOLAM HYDROCHLORIDE 1 MG/ML
INJECTION INTRAMUSCULAR; INTRAVENOUS
Status: DISCONTINUED | OUTPATIENT
Start: 2024-03-13 | End: 2024-03-13 | Stop reason: HOSPADM

## 2024-03-13 RX ORDER — FENTANYL CITRATE 50 UG/ML
INJECTION, SOLUTION INTRAMUSCULAR; INTRAVENOUS
Status: DISCONTINUED | OUTPATIENT
Start: 2024-03-13 | End: 2024-03-13 | Stop reason: HOSPADM

## 2024-03-13 RX ORDER — ACETAMINOPHEN 325 MG/1
650 TABLET ORAL EVERY 4 HOURS PRN
Status: DISCONTINUED | OUTPATIENT
Start: 2024-03-13 | End: 2024-03-13 | Stop reason: HOSPADM

## 2024-03-13 RX ORDER — HEPARIN SODIUM 1000 [USP'U]/ML
INJECTION, SOLUTION INTRAVENOUS; SUBCUTANEOUS
Status: DISCONTINUED | OUTPATIENT
Start: 2024-03-13 | End: 2024-03-13 | Stop reason: HOSPADM

## 2024-03-13 RX ORDER — SODIUM CHLORIDE 9 MG/ML
INJECTION, SOLUTION INTRAVENOUS
Status: COMPLETED | OUTPATIENT
Start: 2024-03-13 | End: 2024-03-13

## 2024-03-13 RX ADMIN — PANTOPRAZOLE SODIUM 40 MG: 40 TABLET, DELAYED RELEASE ORAL at 08:20

## 2024-03-13 RX ADMIN — ASPIRIN 81 MG: 81 TABLET, COATED ORAL at 08:20

## 2024-03-13 RX ADMIN — Medication 10 ML: at 08:28

## 2024-03-13 NOTE — DISCHARGE SUMMARY
"[facility]  Physician Discharge Summary    Patient Identification:  Name: Cierra Kc  Age: 83 y.o.  Sex: female  :  1940  MRN: [id number]    Admit date: 3/11/2024    Discharge date and time:  3/13/24 at 1324      Admitting Physician: Luana Hernandez MD     Discharge Physician:  Luana Hernandez MD    Discharge Diagnoses:   Active Hospital Problems    Diagnosis     **Chest pain     Essential hypertension     Unstable angina pectoris        Discharged Condition: stable    Hospital Course:     H&P for full details.  She presented to [facility] as a direct admit for chest pain.  She underwent a stress test which was abnormal.  Her echo revealed EF 56 to 60% with normal LV function.  She underwent a cardiac cath which revealed normal coronary arteries.  She is in stable condition after discharge.      consults:   IP CONSULT TO INTERNAL MEDICINE    Discharge Exam:   /65 (BP Location: Left arm, Patient Position: Lying)   Pulse 62   Temp 98.6 °F (37 °C) (Oral)   Resp 14   Ht 175.3 cm (69\")   Wt 92.1 kg (203 lb)   SpO2 100%   BMI 29.98 kg/m²   General appearance: No acute changes   Neck: Trachea midline; NECK, supple, no thyromegaly or lymphadenopathy   Lungs: Normal size and shape, normal breath sounds, equal distribution of air, no rales and rhonchi   CV: S1-S2 regular, no murmurs, no rub, no gallop   Abdomen: Soft, nontender; no masses , no abnormal abdominal sounds   Extremities: No deformity , normal color , no peripheral edema   Skin: Normal temperature, turgor and texture; no rash, ulcers            LABS:  Results from last 7 days   Lab Units 24  0414 24  1818 24  1626   HSTROP T ng/L 11 9 9     Results from last 7 days   Lab Units 24  0410   MAGNESIUM mg/dL 1.9     Results from last 7 days   Lab Units 24  0410   SODIUM mmol/L 141   POTASSIUM mmol/L 4.1   BUN mg/dL 15   CREATININE mg/dL 0.87   CALCIUM mg/dL 9.5 "     @[email or web address]@  Results from last 7 days   Lab Units 03/13/24  0410 03/12/24  0414 03/11/24  1626   WBC 10*3/mm3 4.09 4.49 5.34   HEMOGLOBIN g/dL 11.0* 11.1* 10.9*   HEMATOCRIT % 34.7 34.7 34.3   PLATELETS 10*3/mm3 126* 136* 131*         Results from last 7 days   Lab Units 03/13/24  0410   CHOLESTEROL mg/dL 173   TRIGLYCERIDES mg/dL 142   HDL CHOL mg/dL 45   LDL CHOL mg/dL 103*     Disposition:  Home    Discharge Medications:      Discharge Medications        ASK your doctor about these medications        Instructions Start Date   acetaminophen 325 MG tablet  Commonly known as: TYLENOL   650 mg, Oral, Every 4 Hours PRN      amLODIPine 5 MG tablet  Commonly known as: NORVASC   5 mg, Oral, Nightly      aspirin 81 MG EC tablet   81 mg, Oral, Daily      atorvastatin 10 MG tablet  Commonly known as: LIPITOR   10 mg, Oral, Nightly      divalproex 500 MG DR tablet  Commonly known as: DEPAKOTE   500 mg, Oral, Nightly      famotidine 20 MG tablet  Commonly known as: PEPCID   20 mg, Oral, 2 Times Daily      furosemide 20 MG tablet  Commonly known as: LASIX   20 mg, Oral, As Needed      Gemtesa 75 MG tablet  Generic drug: Vibegron       metoprolol succinate XL 50 MG 24 hr tablet  Commonly known as: TOPROL-XL   50 mg, Oral, Nightly      MULTI COMPLETE PO   1 tablet, Oral, Daily, HOLD FOR SURGERY      nitroglycerin 0.4 MG SL tablet  Commonly known as: NITROSTAT   0.4 mg, Sublingual, Every 5 Minutes PRN      Omega-3 300 MG capsule   1 capsule, Oral, Daily      PARoxetine 20 MG tablet  Commonly known as: PAXIL   20 mg, Oral, Nightly      TURMERIC PO   1 tablet, Oral, Daily, HOLD FOR SURGERY                 Discharge Home Instructions:   1.  Follow-up with your primary care physician in 1 week.  Please call for an appointment.  2.  Follow up 4/1/24 at 12 pm at our [address] office    3. Routine post cardiac catheterization/PCI discharge home care instructions:    1. No submerging procedure site below water for 7-10  "days.  2. No lifting objects greater than 1 lbs for 3 days.  3. If groin site used, avoid climbing several flights of stairs or sitting for longer than 2 hours at a time for the next 24 hours.   4. Monitor puncture site for bleeding and/or knots;. If bleeding should occur at the groin site: lie flat, apply pressure and return to the ER. If bleeding should occur at the wrist site, apply pressure and return to the ER.  5.  You may apply a DRY Band-Aid over the puncture site if needed. Do not apply any lotions, salves or ointments to site.  6. No driving for 3 days.  7. Return to ER for recurrent symptoms.  8. No smoking.  9. Take all medications as prescribed.          Signed:  Luana Hernandez MD  3/13/2024  13:23 EDT      EMR Dragon/Transcription:   \"Dictated utilizing Dragon dictation\".     "

## 2024-03-13 NOTE — PLAN OF CARE
Goal Outcome Evaluation:            Patient denies chest pain nor discomfort, vital signs stable, pt on room air, educate to call for assist, NPO for cath in the morning, pt refused bed alarm, call light in reach cont to monitor.

## 2024-03-13 NOTE — PROGRESS NOTES
"Daily progress note    Referring physician  Dr. MELLO    Subjective   Awake and alert and feeling same like yesterday with no new complaints and going for  Cardiac catheterization.    History of present illness  83-year-old white female with history of COPD coronary artery disease congestive heart failure and gastroesophageal reflux disease directly admitted to cardiology service with chest heaviness and generalized weakness.  Patient denies any fever cough increased shortness of breath palpitation abdominal pain nausea vomiting diarrhea.  I am asked to follow patient for medical problem.  At the time of examination she has no chest heaviness.     REVIEW OF SYSTEMS  Per history of present illness     PHYSICAL EXAM  Blood pressure 139/65, pulse 62, temperature 98.6 °F (37 °C), temperature source Oral, resp. rate 14, height 175.3 cm (69\"), weight 92.1 kg (203 lb), SpO2 100%, not currently breastfeeding.    Constitutional: Awake and alert in no distress  HEENT: Unremarkable  Neck: Normal range of motion. Neck supple.  Cardiovascular: Normal rate, regular rhythm and normal heart sounds.  Pulmonary/Chest: Effort normal and breath sounds normal. No respiratory distress.  Abdominal: Soft. There is no tenderness. There is no rebound and no guarding.  Musculoskeletal: Normal range of motion. She exhibits no edema.   Neurological: No focal deficits she has normal sensation and normal strength.  Skin: Skin is warm and dry. No rash noted.   Psychiatric: Mood and affect normal.     LAB RESULTS  Lab Results (last 24 hours)       Procedure Component Value Units Date/Time    BNP [[id number]]  (Normal) Collected: 03/13/24 0410    Specimen: Blood Updated: 03/13/24 0541     proBNP 171.0 pg/mL     Narrative:      This assay is used as an aid in the diagnosis of individuals suspected of having heart failure. It can be used as an aid in the diagnosis of acute decompensated heart failure (ADHF) in patients presenting with signs and symptoms of " ADHF to the emergency department (ED). In addition, NT-proBNP of <300 pg/mL indicates ADHF is not likely.    Age Range Result Interpretation  NT-proBNP Concentration (pg/mL:      <50             Positive            >450                   Gray                 300-450                    Negative             <300    50-75           Positive            >900                  Gray                300-900                  Negative            <300      >75             Positive            >1800                  Gray                300-1800                  Negative            <300    TSH [[id number]]  (Normal) Collected: 03/13/24 0410    Specimen: Blood Updated: 03/13/24 0541     TSH 1.590 uIU/mL     CBC & Differential [[id number]]  (Abnormal) Collected: 03/13/24 0410    Specimen: Blood Updated: 03/13/24 0538    Narrative:      The following orders were created for panel order CBC & Differential.  Procedure                               Abnormality         Status                     ---------                               -----------         ------                     CBC Auto Differential[[id number]]        Abnormal            Final result               Scan Slide[[id number]]                                       Final result                 Please view results for these tests on the individual orders.    Scan Slide [[id number]] Collected: 03/13/24 0410    Specimen: Blood Updated: 03/13/24 0538    CBC Auto Differential [[id number]]  (Abnormal) Collected: 03/13/24 0410    Specimen: Blood Updated: 03/13/24 0538     WBC 4.09 10*3/mm3      RBC 4.10 10*6/mm3      Hemoglobin 11.0 g/dL      Hematocrit 34.7 %      MCV 84.6 fL      MCH 26.8 pg      MCHC 31.7 g/dL      RDW 14.2 %      RDW-SD 43.8 fl      MPV 11.6 fL      Platelets 126 10*3/mm3      Neutrophil % 48.5 %      Lymphocyte % 38.1 %      Monocyte % 7.1 %      Eosinophil % 4.6 %      Basophil % 1.5 %      Neutrophils, Absolute 1.98 10*3/mm3      Lymphocytes, Absolute 1.56 10*3/mm3       Monocytes, Absolute 0.29 10*3/mm3      Eosinophils, Absolute 0.19 10*3/mm3      Basophils, Absolute 0.06 10*3/mm3     Comprehensive Metabolic Panel [[id number]]  (Abnormal) Collected: 03/13/24 0410    Specimen: Blood Updated: 03/13/24 0537     Glucose 95 mg/dL      BUN 15 mg/dL      Creatinine 0.87 mg/dL      Sodium 141 mmol/L      Potassium 4.1 mmol/L      Chloride 106 mmol/L      CO2 26.0 mmol/L      Calcium 9.5 mg/dL      Total Protein 5.6 g/dL      Albumin 3.7 g/dL      ALT (SGPT) 6 U/L      AST (SGOT) 8 U/L      Alkaline Phosphatase 69 U/L      Total Bilirubin 0.5 mg/dL      Globulin 1.9 gm/dL      A/G Ratio 1.9 g/dL      BUN/Creatinine Ratio 17.2     Anion Gap 9.0 mmol/L      eGFR 66.2 mL/min/1.73     Narrative:      GFR Normal >60  Chronic Kidney Disease <60  Kidney Failure <15    The GFR formula is only valid for adults with stable renal function between ages 18 and 70.    Magnesium [[id number]]  (Normal) Collected: 03/13/24 0410    Specimen: Blood Updated: 03/13/24 0537     Magnesium 1.9 mg/dL     Lipid Panel [[id number]]  (Abnormal) Collected: 03/13/24 0410    Specimen: Blood Updated: 03/13/24 0535     Total Cholesterol 173 mg/dL      Triglycerides 142 mg/dL      HDL Cholesterol 45 mg/dL      LDL Cholesterol  103 mg/dL      VLDL Cholesterol 25 mg/dL      LDL/HDL Ratio 2.21    Narrative:      Cholesterol Reference Ranges  (U.S. Department of Health and Human Services ATP III Classifications)    Desirable          <200 mg/dL  Borderline High    200-239 mg/dL  High Risk          >240 mg/dL      Triglyceride Reference Ranges  (U.S. Department of Health and Human Services ATP III Classifications)    Normal           <150 mg/dL  Borderline High  150-199 mg/dL  High             200-499 mg/dL  Very High        >500 mg/dL    HDL Reference Ranges  (U.S. Department of Health and Human Services ATP III Classifications)    Low     <40 mg/dl (major risk factor for CHD)  High    >60 mg/dl ('negative' risk factor for  CHD)        LDL Reference Ranges  (U.S. Department of Health and Human Services ATP III Classifications)    Optimal          <100 mg/dL  Near Optimal     100-129 mg/dL  Borderline High  130-159 mg/dL  High             160-189 mg/dL  Very High        >189 mg/dL    Hemoglobin A1c [[id number]]  (Normal) Collected: 03/13/24 0410    Specimen: Blood Updated: 03/13/24 0528     Hemoglobin A1C 5.40 %     Narrative:      Hemoglobin A1C Ranges:    Increased Risk for Diabetes  5.7% to 6.4%  Diabetes                     >= 6.5%  Diabetic Goal                < 7.0%          Imaging Results (Last 24 Hours)       ** No results found for the last 24 hours. **          Results  Scan on 3/12/2024 0327 by [employer], [place]: ECG 12-LEAD         Author: -- Service: -- Author Type: --   Filed: Date of Service: Creation Time:   Status: (Other)   HEART RATE= 62  bpm  RR Interval= 968  ms  VT Interval= 130  ms  P Horizontal Axis= 24  deg  P Front Axis= 31  deg  QRSD Interval= 100  ms  QT Interval= 431  ms  QTcB= 438  ms  QRS Axis= 41  deg  T Wave Axis= 57  deg  - OTHERWISE NORMAL ECG -  Sinus rhythm  Minimal ST depression, inferior leads          Current Facility-Administered Medications:     acetaminophen (TYLENOL) tablet 650 mg, 650 mg, Oral, Q4H PRN, Luana Hernandez MD    amLODIPine (NORVASC) tablet 5 mg, 5 mg, Oral, Nightly, Luana Hernandez MD, 5 mg at 03/12/24 2110    aspirin EC tablet 81 mg, 81 mg, Oral, Daily, Luana Hernandez MD, 81 mg at 03/13/24 0820    atorvastatin (LIPITOR) tablet 10 mg, 10 mg, Oral, Nightly, Luana Hernandez MD, 10 mg at 03/12/24 2109    sennosides-docusate (PERICOLACE) 8.6-50 MG per tablet 2 tablet, 2 tablet, Oral, BID PRN **AND** polyethylene glycol (MIRALAX) packet 17 g, 17 g, Oral, Daily PRN **AND** bisacodyl (DULCOLAX) EC tablet 5 mg, 5 mg, Oral, Daily PRN **AND** bisacodyl (DULCOLAX) suppository 10 mg, 10 mg, Rectal, Daily PRN, Luana Hernandez MD    Calcium Replacement -  Follow Nurse / BPA Driven Protocol, , Does not apply, PRN, Luana Hernandez MD    divalproex (DEPAKOTE) DR tablet 500 mg, 500 mg, Oral, Nightly, Luana Hernandez MD, 500 mg at 03/12/24 2110    furosemide (LASIX) tablet 20 mg, 20 mg, Oral, PRN, Luana Hernandez MD    hydrALAZINE (APRESOLINE) injection 10 mg, 10 mg, Intravenous, Q6H PRN, Luana Hernandez MD    Magnesium Standard Dose Replacement - Follow Nurse / BPA Driven Protocol, , Does not apply, PRN, Luana Hernandez MD    metoprolol succinate XL (TOPROL-XL) 24 hr tablet 50 mg, 50 mg, Oral, Nightly, Luana Hernandez MD, 50 mg at 03/12/24 2110    nitroglycerin (NITROSTAT) SL tablet 0.4 mg, 0.4 mg, Sublingual, Q5 Min PRN, Luana Hernandez MD    nitroglycerin (NITROSTAT) SL tablet 0.4 mg, 0.4 mg, Sublingual, Q5 Min PRN, Luana Hernandez MD    pantoprazole (PROTONIX) EC tablet 40 mg, 40 mg, Oral, BID AC, Luana Hernandez MD, 40 mg at 03/13/24 0820    PARoxetine (PAXIL) tablet 20 mg, 20 mg, Oral, Nightly, Luana Hernandez MD, 20 mg at 03/12/24 2110    Phosphorus Replacement - Follow Nurse / BPA Driven Protocol, , Does not apply, PRN, Luana Hernandez MD    Potassium Replacement - Follow Nurse / BPA Driven Protocol, , Does not apply, PRN, Luana Hernandez MD    sodium chloride 0.9 % flush 10 mL, 10 mL, Intravenous, Q12H, Luana Hernandez MD, 10 mL at 03/13/24 0828    sodium chloride 0.9 % flush 10 mL, 10 mL, Intravenous, PRN, Luana Hernandez MD    sodium chloride 0.9 % infusion 40 mL, 40 mL, Intravenous, PRN, Luana Hernandez MD     ASSESSMENT  Chest heaviness with known coronary artery disease with positive stress Cardiolite  COPD  Hypertension  Hyperlipidemia  Depression  Degenerative disc disease  Gastroesophageal reflux disease    PLAN  CPM  Cardiac catheterization today  Continue home medications  Stress ulcer DVT prophylaxis  Supportive care  Discussed with nursing  staff  Will follow with Dr. MELLO and further recommendation current hospital course    SHARON LEWIS MD    Copied text in this note has been reviewed and is accurate as of 03/13/24

## 2024-03-14 LAB
QT INTERVAL: 431 MS
QT INTERVAL: 439 MS
QTC INTERVAL: 438 MS
QTC INTERVAL: 439 MS

## 2024-03-14 NOTE — OUTREACH NOTE
Prep Survey      Flowsheet Row Responses   [facility] facility patient discharged from? [place]   Is LACE score < 7 ? No   Eligibility Readm Mgmt   Discharge diagnosis Chest pain, heart cath - normal coronary arteries   Does the patient have one of the following disease processes/diagnoses(primary or secondary)? Other   Does the patient have Home health ordered? No   Is there a DME ordered? No   Prep survey completed? Yes            Nancy PATINO - Registered Nurse

## 2024-03-19 ENCOUNTER — READMISSION MANAGEMENT (OUTPATIENT)
Dept: CALL CENTER | Facility: HOSPITAL | Age: 84
End: 2024-03-19
Payer: MEDICARE

## 2024-03-19 NOTE — OUTREACH NOTE
Medical Week 1 Survey      Flowsheet Row Responses   [facility] patient discharged from? [place]   Does the patient have one of the following disease processes/diagnoses(primary or secondary)? Other   Week 1 attempt successful? No   Unsuccessful attempts Attempt 1            Alicia WILKINS - Licensed Nurse

## 2024-04-01 ENCOUNTER — OFFICE VISIT (OUTPATIENT)
Dept: CARDIOLOGY | Facility: CLINIC | Age: 84
End: 2024-04-01
Payer: MEDICARE

## 2024-04-01 VITALS
DIASTOLIC BLOOD PRESSURE: 80 MMHG | HEART RATE: 85 BPM | SYSTOLIC BLOOD PRESSURE: 144 MMHG | WEIGHT: 205 LBS | BODY MASS INDEX: 29.35 KG/M2 | HEIGHT: 70 IN

## 2024-04-01 DIAGNOSIS — I10 ESSENTIAL HYPERTENSION: Primary | ICD-10-CM

## 2024-04-01 DIAGNOSIS — R07.2 PRECORDIAL PAIN: ICD-10-CM

## 2024-04-01 DIAGNOSIS — I25.118 CORONARY ARTERY DISEASE OF NATIVE ARTERY OF NATIVE HEART WITH STABLE ANGINA PECTORIS: ICD-10-CM

## 2024-04-01 DIAGNOSIS — E78.5 HYPERLIPIDEMIA, UNSPECIFIED HYPERLIPIDEMIA TYPE: ICD-10-CM

## 2024-04-01 NOTE — PROGRESS NOTES
"CATH   Subjective:        Cierra Kc is a 83 y.o. female who here for follow up    CC  Follow-up coronary artery disease hypertension hyperlipidemia  HPI  83-year-old female with coronary artery disease chest pain hypertension hyperlipidemia here for the follow-up with no complaints of chest pains tightness heaviness or the pressure sensation     Problems Addressed this Visit          Cardiac and Vasculature    Chest pain    Coronary artery disease of native artery of native heart with stable angina pectoris    Essential hypertension - Primary    Hyperlipidemia     Diagnoses         Codes Comments    Essential hypertension    -  Primary ICD-10-CM: I10  ICD-9-CM: 401.9     Coronary artery disease of native artery of native heart with stable angina pectoris     ICD-10-CM: I25.118  ICD-9-CM: 414.01, 413.9     Hyperlipidemia, unspecified hyperlipidemia type     ICD-10-CM: E78.5  ICD-9-CM: 272.4     Precordial pain     ICD-10-CM: R07.2  ICD-9-CM: 786.51           .    The following portions of the patient's history were reviewed and updated as appropriate: allergies, current medications, past family history, past medical history, past social history, past surgical history and problem list.    Past Medical History:   Diagnosis Date    Cervical disc disease     COPD (chronic obstructive pulmonary disease)     Coronary artery disease     DDD (degenerative disc disease), lumbar     Diastolic CHF     GERD (gastroesophageal reflux disease)     History of DVT of lower extremity 1970s, 1980s    x 2    History of MRSA infection     IT STATED SHE THINKS IT WAS ON HER \"ARMS.\" STATED IT'S \"WAS A LONG TIME AGO.\"    Hyperlipidemia     Hypertension     Rectal abscess     Seizures     Stress incontinence     Stroke 2001    Vitamin B 12 deficiency      reports that she quit smoking about 33 years ago. Her smoking use included cigarettes. She started smoking about 53 years ago. She has a 10 pack-year smoking history. She has never " "used smokeless tobacco. She reports that she does not drink alcohol and does not use drugs.   Family History   Problem Relation Age of Onset    Hypertension Mother          2023, was 103yrs    Hyperlipidemia Mother     Hypertension Father     COPD Father     Stroke Brother     Clotting disorder Brother     Hypertension Brother     Hyperlipidemia Brother     Diabetes Maternal Aunt     Diabetes Maternal Uncle     Heart disease Maternal Grandmother     Stroke Maternal Grandmother     Diabetes Maternal Grandmother     Malig Hyperthermia Neg Hx        Review of Systems  Constitutional: No wt loss, fever, fatigue  Gastrointestinal: No nausea, abdominal pain  Behavioral/Psych: No insomnia or anxiety   Cardiovascular no chest pains or tightness in the chest  Objective:       Physical Exam           Physical Exam  /80   Pulse 85   Ht 177.8 cm (70\")   Wt 93 kg (205 lb)   BMI 29.41 kg/m²     General appearance: No acute changes   Eyes: Sclerae conjunctivae normal, pupils reactive   HENT: Atraumatic; oropharynx clear with moist mucous membranes and no mucosal ulcerations;  Neck: Trachea midline; NECK, supple, no thyromegaly or lymphadenopathy   Lungs: Normal size and shape, normal breath sounds, equal distribution of air, no rales and rhonchi   CV: S1-S2 regular, no murmurs, no rub, no gallop   Abdomen: Soft, nontender; no masses , no abnormal abdominal sounds   Extremities: No deformity , normal color , no peripheral edema   Skin: Normal temperature, turgor and texture; no rash, ulcers  Psych: Appropriate affect, alert and oriented to person, place and time           Procedures      Echocardiogram:    Results for orders placed during the hospital encounter of 24    Adult Transthoracic Echo Complete W/ Cont if Necessary Per Protocol    Interpretation Summary    Left ventricular ejection fraction appears to be 56 - 60%.    Left ventricular diastolic function was normal.      Successful right and left " coronary angiogram and LV gram    Normal coronary arteries    Normal LV gram      Current Outpatient Medications:     acetaminophen (TYLENOL) 325 MG tablet, Take 2 tablets by mouth Every 4 (Four) Hours As Needed for Mild Pain ., Disp: 30 tablet, Rfl: 0    amLODIPine (NORVASC) 5 MG tablet, Take 1 tablet by mouth Every Night., Disp: , Rfl:     aspirin 81 MG EC tablet, Take 1 tablet by mouth Daily., Disp: , Rfl:     atorvastatin (LIPITOR) 10 MG tablet, Take 1 tablet by mouth Every Night., Disp: , Rfl:     divalproex (DEPAKOTE) 500 MG DR tablet, Take 1 tablet by mouth Every Night., Disp: , Rfl:     famotidine (PEPCID) 20 MG tablet, Take 1 tablet by mouth 2 (Two) Times a Day., Disp: , Rfl:     furosemide (LASIX) 20 MG tablet, Take 1 tablet by mouth As Needed., Disp: , Rfl:     Gemtesa 75 MG tablet, , Disp: , Rfl:     metoprolol succinate XL (TOPROL-XL) 50 MG 24 hr tablet, Take 1 tablet by mouth Every Night., Disp: , Rfl:     Multiple Vitamins-Minerals (MULTI COMPLETE PO), Take 1 tablet by mouth Daily. HOLD FOR SURGERY, Disp: , Rfl:     nitroglycerin (NITROSTAT) 0.4 MG SL tablet, Place 1 tablet under the tongue Every 5 (Five) Minutes As Needed for Chest Pain., Disp: , Rfl:     Omega-3 300 MG capsule, Take 1 capsule by mouth Daily., Disp: , Rfl:     PARoxetine (PAXIL) 20 MG tablet, Take 1 tablet by mouth Every Night., Disp: , Rfl:     TURMERIC PO, Take 1 tablet by mouth Daily. HOLD FOR SURGERY, Disp: , Rfl:    Assessment:                Plan:          ICD-10-CM ICD-9-CM   1. Essential hypertension  I10 401.9   2. Coronary artery disease of native artery of native heart with stable angina pectoris  I25.118 414.01     413.9   3. Hyperlipidemia, unspecified hyperlipidemia type  E78.5 272.4   4. Precordial pain  R07.2 786.51     1. Essential hypertension  Patient understands importance of blood pressure check at home which patient does regularly and the blood pressures are well under control to the level of less than  140/90      2. Coronary artery disease of native artery of native heart with stable angina pectoris  No chest pain    3. Hyperlipidemia, unspecified hyperlipidemia type  Continue current treatment    4. Precordial pain  Under control      1 YR  COUNSELING:    Cierra Dick was given to patient for the following topics: diagnostic results, risk factor reductions, impressions, risks and benefits of treatment options and importance of treatment compliance .       SMOKING COUNSELING:        Dictated using Dragon dictation

## 2024-04-03 ENCOUNTER — READMISSION MANAGEMENT (OUTPATIENT)
Dept: CALL CENTER | Facility: HOSPITAL | Age: 84
End: 2024-04-03
Payer: MEDICARE

## 2024-04-03 NOTE — OUTREACH NOTE
Medical Week 3 Survey      Flowsheet Row Responses   [facility] patient discharged from? [place]   Does the patient have one of the following disease processes/diagnoses(primary or secondary)? Other   Week 3 attempt successful? No   Unsuccessful attempts Attempt 1            GABE SALEH - Registered Nurse

## 2024-04-08 ENCOUNTER — READMISSION MANAGEMENT (OUTPATIENT)
Dept: CALL CENTER | Facility: HOSPITAL | Age: 84
End: 2024-04-08
Payer: MEDICARE

## 2024-04-08 NOTE — OUTREACH NOTE
Medical Week 3 Survey      Flowsheet Row Responses   [facility] patient discharged from? [place]   Does the patient have one of the following disease processes/diagnoses(primary or secondary)? Other   Week 3 attempt successful? No   Unsuccessful attempts Attempt 2            Carolyn Anders Registered Nurse

## 2024-05-29 NOTE — TELEPHONE ENCOUNTER
Hub staff attempted to follow warm transfer process and was unsuccessful     Caller: Cierra Kc    Relationship to patient: Self    Best call back number: [phone]    Patient is needing: PATIENT STATED THAT SHE IS HAVING A NUCLEAR BONE TEST DONE TOMORROW MORNING AT 10 AM AND SHE NEEDS TO KNOW IF IT WILL INTERFERE WITH HER ECHO AND STRESS TEST SHE HAS SCHEDULED FOR 09.21.23 WHICH IS THE NEXT DAY. PATIENT STATED THAT SHE HAS TO LEAVE FOR THE TEST AT 8:30 AM. PLEASE CONTACT PATIENT TO ADVISE. THANK YOU.           
MARIEL BRAR CALLED AND SPOKE WITH PATIENT   
No

## 2024-08-08 ENCOUNTER — TELEPHONE (OUTPATIENT)
Dept: NEUROSURGERY | Facility: CLINIC | Age: 84
End: 2024-08-08
Payer: MEDICARE

## 2024-08-08 NOTE — TELEPHONE ENCOUNTER
PATIENT CALLED AND STATES HER APPT WITH GRACE VARGAS WILL NEED TO BE RESCHEDULED.  SHE IS SCHEDULED TO SEE GRACE VARGAS 08/27/2024 AND HER CTA IS SCHEDULED FOR 08/28/2024.  HUB UNABLE TO SCHEDULE FOR GRACE VARGAS.    PLEASE CALL PATIENT   THANK YOU

## 2024-08-28 ENCOUNTER — HOSPITAL ENCOUNTER (OUTPATIENT)
Dept: CT IMAGING | Facility: HOSPITAL | Age: 84
Discharge: HOME | End: 2024-08-28
Admitting: NURSE PRACTITIONER
Payer: MEDICARE

## 2024-08-28 DIAGNOSIS — I72.9 PSEUDOANEURYSM: ICD-10-CM

## 2024-08-28 PROCEDURE — 70498 CT ANGIOGRAPHY NECK: CPT

## 2024-08-28 PROCEDURE — 70496 CT ANGIOGRAPHY HEAD: CPT

## 2024-08-28 PROCEDURE — 25510000001 IOPAMIDOL PER 1 ML: Performed by: NURSE PRACTITIONER

## 2024-08-28 PROCEDURE — 82565 ASSAY OF CREATININE: CPT

## 2024-08-28 RX ORDER — IOPAMIDOL 755 MG/ML
100 INJECTION, SOLUTION INTRAVASCULAR
Status: COMPLETED | OUTPATIENT
Start: 2024-08-28 | End: 2024-08-28

## 2024-08-28 RX ADMIN — IOPAMIDOL 95 ML: 755 INJECTION, SOLUTION INTRAVENOUS at 13:40
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
## 2024-08-29 LAB — CREAT BLDA-MCNC: 0.9 MG/DL (ref 0.6–1.3)

## 2024-09-03 ENCOUNTER — OFFICE VISIT (OUTPATIENT)
Dept: NEUROSURGERY | Facility: CLINIC | Age: 84
End: 2024-09-03
Payer: MEDICARE

## 2024-09-03 VITALS
BODY MASS INDEX: 28.63 KG/M2 | OXYGEN SATURATION: 99 % | RESPIRATION RATE: 16 BRPM | HEART RATE: 71 BPM | WEIGHT: 200 LBS | SYSTOLIC BLOOD PRESSURE: 132 MMHG | TEMPERATURE: 97 F | DIASTOLIC BLOOD PRESSURE: 68 MMHG | HEIGHT: 70 IN

## 2024-09-03 DIAGNOSIS — I72.9 PSEUDOANEURYSM: Primary | ICD-10-CM

## 2024-09-03 PROCEDURE — 3075F SYST BP GE 130 - 139MM HG: CPT | Performed by: NURSE PRACTITIONER

## 2024-09-03 PROCEDURE — 1160F RVW MEDS BY RX/DR IN RCRD: CPT | Performed by: NURSE PRACTITIONER

## 2024-09-03 PROCEDURE — 3078F DIAST BP <80 MM HG: CPT | Performed by: NURSE PRACTITIONER

## 2024-09-03 PROCEDURE — 99214 OFFICE O/P EST MOD 30 MIN: CPT | Performed by: NURSE PRACTITIONER

## 2024-09-03 PROCEDURE — 1159F MED LIST DOCD IN RCRD: CPT | Performed by: NURSE PRACTITIONER

## 2024-09-03 RX ORDER — ERGOCALCIFEROL 1.25 MG/1
100000 CAPSULE ORAL
COMMUNITY
Start: 2024-08-28 | End: 2024-11-26

## 2024-09-03 RX ORDER — DOXYCYCLINE 100 MG/1
100 CAPSULE ORAL
COMMUNITY
Start: 2024-08-29

## 2024-11-04 ENCOUNTER — OFFICE VISIT (OUTPATIENT)
Dept: CARDIOLOGY | Facility: CLINIC | Age: 84
End: 2024-11-04
Payer: MEDICARE

## 2024-11-04 VITALS
SYSTOLIC BLOOD PRESSURE: 165 MMHG | WEIGHT: 197 LBS | HEART RATE: 79 BPM | HEIGHT: 70 IN | BODY MASS INDEX: 28.2 KG/M2 | DIASTOLIC BLOOD PRESSURE: 70 MMHG

## 2024-11-04 DIAGNOSIS — I10 ESSENTIAL HYPERTENSION: Primary | ICD-10-CM

## 2024-11-04 DIAGNOSIS — R07.2 PRECORDIAL PAIN: ICD-10-CM

## 2024-11-04 DIAGNOSIS — I25.118 CORONARY ARTERY DISEASE OF NATIVE ARTERY OF NATIVE HEART WITH STABLE ANGINA PECTORIS: ICD-10-CM

## 2024-11-04 PROCEDURE — 3078F DIAST BP <80 MM HG: CPT | Performed by: INTERNAL MEDICINE

## 2024-11-04 PROCEDURE — 99213 OFFICE O/P EST LOW 20 MIN: CPT | Performed by: INTERNAL MEDICINE

## 2024-11-04 PROCEDURE — 93000 ELECTROCARDIOGRAM COMPLETE: CPT | Performed by: INTERNAL MEDICINE

## 2024-11-04 PROCEDURE — 3077F SYST BP >= 140 MM HG: CPT | Performed by: INTERNAL MEDICINE

## 2024-11-04 NOTE — PROGRESS NOTES
"1 YR FOLLOW UP    Subjective:        Cierra Kc is a 84 y.o. female who here for follow up    CC  Follow-up coronary artery disease hypertension  HPI  84-year-old female with coronary artery disease chest pains hypertension here for the follow-up denies any chest pains or tightness in the chest     Problems Addressed this Visit          Cardiac and Vasculature    Chest pain    Coronary artery disease of native artery of native heart with stable angina pectoris    Essential hypertension - Primary     Diagnoses         Codes Comments    Essential hypertension    -  Primary ICD-10-CM: I10  ICD-9-CM: 401.9     Coronary artery disease of native artery of native heart with stable angina pectoris     ICD-10-CM: I25.118  ICD-9-CM: 414.01, 413.9     Precordial pain     ICD-10-CM: R07.2  ICD-9-CM: 786.51           .    The following portions of the patient's history were reviewed and updated as appropriate: allergies, current medications, past family history, past medical history, past social history, past surgical history and problem list.    Past Medical History:   Diagnosis Date    Cancer 2024    Kidney, Left Leg    Cervical disc disease     COPD (chronic obstructive pulmonary disease)     Coronary artery disease     DDD (degenerative disc disease), lumbar     Diastolic CHF     GERD (gastroesophageal reflux disease)     History of DVT of lower extremity 1970s, 1980s    x 2    History of MRSA infection     IT STATED SHE THINKS IT WAS ON HER \"ARMS.\" STATED IT'S \"WAS A LONG TIME AGO.\"    Hyperlipidemia     Hypertension     Rectal abscess     Seizures     Stress incontinence     Stroke 2001    Vitamin B 12 deficiency      reports that she quit smoking about 34 years ago. Her smoking use included cigarettes. She started smoking about 54 years ago. She has a 10 pack-year smoking history. She has never used smokeless tobacco. She reports that she does not drink alcohol and does not use drugs.   Family History   Problem " "Relation Age of Onset    Hypertension Mother          2023, was 103yrs    Hyperlipidemia Mother     Hypertension Father     COPD Father     Stroke Brother     Clotting disorder Brother     Hypertension Brother     Hyperlipidemia Brother     Diabetes Maternal Aunt     Diabetes Maternal Uncle     Heart disease Maternal Grandmother     Stroke Maternal Grandmother     Diabetes Maternal Grandmother     Malig Hyperthermia Neg Hx        Review of Systems  Constitutional: No wt loss, fever, fatigue  Gastrointestinal: No nausea, abdominal pain  Behavioral/Psych: No insomnia or anxiety   Cardiovascular no chest pains or tightness in the chest  Objective:       Physical Exam  /70   Pulse 79   Ht 176.5 cm (69.5\")   Wt 89.4 kg (197 lb)   BMI 28.67 kg/m²   General appearance: No acute changes   Neck: Trachea midline; NECK, supple, no thyromegaly or lymphadenopathy   Lungs: Normal size and shape, normal breath sounds, equal distribution of air, no rales and rhonchi   CV: S1-S2 regular, no murmurs, no rub, no gallop   Abdomen: Soft, nontender; no masses , no abnormal abdominal sounds   Extremities: No deformity , normal color , no peripheral edema   Skin: Normal temperature, turgor and texture; no rash, ulcers            ECG 12 Lead    Date/Time: 2024 12:35 PM  Performed by: Luana Hernandez MD    Authorized by: Luana Hernandez MD  Comparison: compared with previous ECG   Similar to previous ECG  Rhythm: sinus rhythm    Clinical impression: non-specific ECG            Echocardiogram:    Results for orders placed during the hospital encounter of 24    Adult Transthoracic Echo Complete W/ Cont if Necessary Per Protocol    Interpretation Summary    Left ventricular ejection fraction appears to be 56 - 60%.    Left ventricular diastolic function was normal.          Current Outpatient Medications:     acetaminophen (TYLENOL) 325 MG tablet, Take 2 tablets by mouth Every 4 (Four) Hours As " Needed for Mild Pain ., Disp: 30 tablet, Rfl: 0    ALBUTEROL SULFATE HFA IN, Inhale 2 puffs., Disp: , Rfl:     amLODIPine (NORVASC) 5 MG tablet, Take 1 tablet by mouth Every Night., Disp: , Rfl:     aspirin 81 MG EC tablet, Take 1 tablet by mouth Daily., Disp: , Rfl:     atorvastatin (LIPITOR) 10 MG tablet, Take 1 tablet by mouth Every Night., Disp: , Rfl:     divalproex (DEPAKOTE) 500 MG DR tablet, Take 1 tablet by mouth Every Night., Disp: , Rfl:     doxycycline (MONODOX) 100 MG capsule, Take 1 capsule by mouth., Disp: , Rfl:     ergocalciferol (ERGOCALCIFEROL) 1.25 MG (96997 UT) capsule, Take 2 capsules by mouth., Disp: , Rfl:     famotidine (PEPCID) 20 MG tablet, Take 1 tablet by mouth 2 (Two) Times a Day., Disp: , Rfl:     furosemide (LASIX) 20 MG tablet, Take 1 tablet by mouth As Needed., Disp: , Rfl:     Gemtesa 75 MG tablet, , Disp: , Rfl:     metoprolol succinate XL (TOPROL-XL) 50 MG 24 hr tablet, Take 1 tablet by mouth Every Night., Disp: , Rfl:     Multiple Vitamins-Minerals (MULTI COMPLETE PO), Take 1 tablet by mouth Daily. HOLD FOR SURGERY, Disp: , Rfl:     nitroglycerin (NITROSTAT) 0.4 MG SL tablet, Place 1 tablet under the tongue Every 5 (Five) Minutes As Needed for Chest Pain., Disp: , Rfl:     Omega-3 300 MG capsule, Take 1 capsule by mouth Daily., Disp: , Rfl:     PARoxetine (PAXIL) 20 MG tablet, Take 1 tablet by mouth Every Night., Disp: , Rfl:     TURMERIC PO, Take 1 tablet by mouth Daily. HOLD FOR SURGERY, Disp: , Rfl:    Assessment:                Plan:          ICD-10-CM ICD-9-CM   1. Essential hypertension  I10 401.9   2. Coronary artery disease of native artery of native heart with stable angina pectoris  I25.118 414.01     413.9   3. Precordial pain  R07.2 786.51     1. Essential hypertension  Patient understands importance of blood pressure check at home which patient does regularly and the blood pressures are well under control to the level of less than 140/90      2. Coronary artery  disease of native artery of native heart with stable angina pectoris  No angina pectoris    3. Precordial pain  Atypical      1 YR  COUNSELING:    Cierra Dick was given to patient for the following topics: diagnostic results, risk factor reductions, impressions, risks and benefits of treatment options and importance of treatment compliance .       SMOKING COUNSELING:        Dictated using Dragon dictation

## 2025-08-27 ENCOUNTER — HOSPITAL ENCOUNTER (OUTPATIENT)
Dept: CT IMAGING | Facility: HOSPITAL | Age: 85
Discharge: HOME | End: 2025-08-27
Admitting: NURSE PRACTITIONER
Payer: MEDICARE

## 2025-08-27 DIAGNOSIS — I72.9 PSEUDOANEURYSM: ICD-10-CM

## 2025-08-27 LAB — CREAT BLDA-MCNC: 1.2 MG/DL (ref 0.6–1.3)

## 2025-08-27 PROCEDURE — 70496 CT ANGIOGRAPHY HEAD: CPT

## 2025-08-27 PROCEDURE — 25510000001 IOPAMIDOL PER 1 ML: Performed by: NURSE PRACTITIONER

## 2025-08-27 PROCEDURE — 82565 ASSAY OF CREATININE: CPT

## 2025-08-27 PROCEDURE — 70498 CT ANGIOGRAPHY NECK: CPT

## 2025-08-27 RX ORDER — IOPAMIDOL 755 MG/ML
100 INJECTION, SOLUTION INTRAVASCULAR
Status: COMPLETED | OUTPATIENT
Start: 2025-08-27 | End: 2025-08-27

## 2025-08-27 RX ADMIN — IOPAMIDOL 95 ML: 755 INJECTION, SOLUTION INTRAVENOUS at 11:16

## (undated) DEVICE — GAUZE,SPONGE,FLUFF,6"X6.75",STRL,10/TRAY: Brand: MEDLINE

## (undated) DEVICE — GLV SURG SENSICARE W/ALOE PF LF 9 STRL

## (undated) DEVICE — PK CATH CARD 40

## (undated) DEVICE — 2108 SERIES SAGITTAL BLADE, NO OFFSET  (12.4 X 1.19 X 82.1MM)

## (undated) DEVICE — SOL NACL 0.9PCT 1000ML

## (undated) DEVICE — GLV SURG PREMIERPRO ORTHO LTX PF SZ9 BRN

## (undated) DEVICE — ZIP 24 SURGICAL SKIN CLOSURE DEVICE, PSA: Brand: ZIP 24 SURGICAL SKIN CLOSURE DEVICE

## (undated) DEVICE — GLV SURG PREMIERPRO ORTHO LTX PF SZ8.5 BRN

## (undated) DEVICE — DRSNG WND GZ CURAD OIL EMULSION 3X8IN LF STRL 1PK

## (undated) DEVICE — BNDG ELAS ELITE V/CLOSE 6IN 5YD LF STRL

## (undated) DEVICE — CATH DIAG IMPULSE FR4 5F 100CM

## (undated) DEVICE — NEEDLE, QUINCKE, 20GX3.5": Brand: MEDLINE

## (undated) DEVICE — GLIDESHEATH SLENDER STAINLESS STEEL KIT: Brand: GLIDESHEATH SLENDER

## (undated) DEVICE — KT DRN EVAC WND PVC PCH WTROC RND 10F400

## (undated) DEVICE — KT MANIFLD CARDIAC

## (undated) DEVICE — DGW .035 FC J3MM 260CM TEF: Brand: EMERALD

## (undated) DEVICE — ANTIBACTERIAL UNDYED BRAIDED (POLYGLACTIN 910), SYNTHETIC ABSORBABLE SUTURE: Brand: COATED VICRYL

## (undated) DEVICE — CATH DIAG IMPULSE FL3.5 5F 100CM

## (undated) DEVICE — SYR LUERLOK 20CC BX/50

## (undated) DEVICE — GLV SURG TRIUMPH CLASSIC PF LTX 8.5 STRL

## (undated) DEVICE — PK KN TOTL 40

## (undated) DEVICE — APPL DURAPREP IODOPHOR APL 26ML

## (undated) DEVICE — UNDERCAST PADDING: Brand: DEROYAL

## (undated) DEVICE — DUAL CUT SAGITTAL BLADE

## (undated) DEVICE — BANDAGE,GAUZE,BULKEE II,4.5"X4.1YD,STRL: Brand: MEDLINE